# Patient Record
Sex: MALE | Race: OTHER | Employment: OTHER
[De-identification: names, ages, dates, MRNs, and addresses within clinical notes are randomized per-mention and may not be internally consistent; named-entity substitution may affect disease eponyms.]

---

## 2017-01-17 ENCOUNTER — TELEPHONE (OUTPATIENT)
Dept: UROLOGY | Facility: CLINIC | Age: 57
End: 2017-01-17

## 2017-03-29 DIAGNOSIS — R39.13 SPLIT URINARY STREAM: Primary | ICD-10-CM

## 2017-11-10 ENCOUNTER — HOSPITAL ENCOUNTER (OUTPATIENT)
Dept: CT IMAGING | Age: 57
Discharge: HOME | End: 2017-11-10
Payer: MEDICAID

## 2017-11-10 DIAGNOSIS — R51.9 NONINTRACTABLE HEADACHE, UNSPECIFIED CHRONICITY PATTERN, UNSPECIFIED HEADACHE TYPE: ICD-10-CM

## 2017-11-10 PROCEDURE — 70450 CT HEAD/BRAIN W/O DYE: CPT

## 2018-02-16 ENCOUNTER — OFFICE VISIT (OUTPATIENT)
Dept: PODIATRY | Age: 58
End: 2018-02-16
Payer: MEDICAID

## 2018-02-16 VITALS
HEART RATE: 74 BPM | SYSTOLIC BLOOD PRESSURE: 154 MMHG | HEIGHT: 68 IN | BODY MASS INDEX: 34.1 KG/M2 | WEIGHT: 225 LBS | DIASTOLIC BLOOD PRESSURE: 87 MMHG

## 2018-02-16 DIAGNOSIS — M79.672 LEFT FOOT PAIN: ICD-10-CM

## 2018-02-16 DIAGNOSIS — B07.0 PLANTAR WART: Primary | ICD-10-CM

## 2018-02-16 PROCEDURE — 17110 DESTRUCTION B9 LES UP TO 14: CPT | Performed by: PODIATRIST

## 2018-02-16 RX ORDER — LISINOPRIL AND HYDROCHLOROTHIAZIDE 20; 12.5 MG/1; MG/1
TABLET ORAL
Refills: 3 | COMMUNITY
Start: 2018-01-14 | End: 2019-09-18 | Stop reason: ALTCHOICE

## 2018-02-16 RX ORDER — FLUTICASONE PROPIONATE 50 MCG
SPRAY, SUSPENSION (ML) NASAL
COMMUNITY
Start: 2017-09-21 | End: 2019-03-19 | Stop reason: ALTCHOICE

## 2018-02-19 ASSESSMENT — ENCOUNTER SYMPTOMS
BACK PAIN: 0
SHORTNESS OF BREATH: 0
COLOR CHANGE: 0
NAUSEA: 0
DIARRHEA: 0

## 2018-03-02 ENCOUNTER — OFFICE VISIT (OUTPATIENT)
Dept: PODIATRY | Age: 58
End: 2018-03-02
Payer: MEDICAID

## 2018-03-02 VITALS
SYSTOLIC BLOOD PRESSURE: 145 MMHG | HEIGHT: 68 IN | BODY MASS INDEX: 34.1 KG/M2 | HEART RATE: 74 BPM | WEIGHT: 225 LBS | DIASTOLIC BLOOD PRESSURE: 90 MMHG

## 2018-03-02 DIAGNOSIS — B07.0 PLANTAR WART: Primary | ICD-10-CM

## 2018-03-02 DIAGNOSIS — M79.672 LEFT FOOT PAIN: ICD-10-CM

## 2018-03-02 PROCEDURE — 17110 DESTRUCTION B9 LES UP TO 14: CPT | Performed by: PODIATRIST

## 2018-03-05 ASSESSMENT — ENCOUNTER SYMPTOMS
NAUSEA: 0
COLOR CHANGE: 0
DIARRHEA: 0
BACK PAIN: 0
SHORTNESS OF BREATH: 0

## 2018-03-05 NOTE — PROGRESS NOTES
Subjective: Velasquez Gutiérrez 56 y.o. male that presents for follow up evaluation of painful callous to the bottom of the left foot. Chief Complaint   Patient presents with    Other     wart left foot    Patient's treatment thus far has included nightly application of apple cider vinegar and duct tape. Pain is rated 5 out of 10 and is described as intermittent. Patient has been following my prescribed course of therapy as instructed. Review of Systems   Constitutional: Negative for activity change, appetite change, chills, diaphoresis, fatigue and fever. Respiratory: Negative for shortness of breath. Cardiovascular: Negative for leg swelling. Gastrointestinal: Negative for diarrhea and nausea. Endocrine: Negative for cold intolerance, heat intolerance and polyuria. Musculoskeletal: Negative for arthralgias, back pain, gait problem, joint swelling and myalgias. Skin: Negative for color change, pallor, rash and wound. Allergic/Immunologic: Negative for environmental allergies and food allergies. Neurological: Negative for dizziness, weakness, light-headedness and numbness. Hematological: Does not bruise/bleed easily. Psychiatric/Behavioral: Negative for behavioral problems, confusion and self-injury. The patient is not nervous/anxious. Objective: Clinical evaluation of the patient reveals callous formation x 1 to the plantar aspect of the left foot. There is pain with side-to-side compression of this lesion. Debridement of this lesion with a 15 blade reveals pinpoint bleeding. There is no erythema or calor noted to this lesion. Assessment:   1. Plantar wart  AR DESTRUCTION BENIGN LESIONS UP TO 14   2. Left foot pain  AR DESTRUCTION BENIGN LESIONS UP TO 14         Plan: 1. Clinical evaluation of the patient. 2. Following debridement of the wart(s) to the left foot, the area(s) were treated with trichloracetic acid and covered with a Band-Aid.   The patient was instructed to apply Apple cider vinegar and duct tape to the wart(s) nightly. 3. Return in about 2 weeks (around 3/16/2018) for Wart management.    3/2/2018      Mandeep Bui DPM

## 2018-03-16 ENCOUNTER — OFFICE VISIT (OUTPATIENT)
Dept: PODIATRY | Age: 58
End: 2018-03-16
Payer: MEDICAID

## 2018-03-16 VITALS
DIASTOLIC BLOOD PRESSURE: 75 MMHG | BODY MASS INDEX: 33.34 KG/M2 | WEIGHT: 220 LBS | HEART RATE: 86 BPM | SYSTOLIC BLOOD PRESSURE: 132 MMHG | HEIGHT: 68 IN

## 2018-03-16 DIAGNOSIS — B07.0 PLANTAR WART: Primary | ICD-10-CM

## 2018-03-16 DIAGNOSIS — M79.672 LEFT FOOT PAIN: ICD-10-CM

## 2018-03-16 PROCEDURE — 17110 DESTRUCTION B9 LES UP TO 14: CPT | Performed by: PODIATRIST

## 2018-03-20 ASSESSMENT — ENCOUNTER SYMPTOMS
BACK PAIN: 0
DIARRHEA: 0
SHORTNESS OF BREATH: 0
COLOR CHANGE: 0
NAUSEA: 0

## 2018-03-20 NOTE — PROGRESS NOTES
Subjective: Starr Rowland 56 y.o. male that presents for follow up evaluation of wart to the left foot. Chief Complaint   Patient presents with    Follow-up     re-check wart left foot     Patient's treatment thus far has included nightly application of apple cider vinegar and duct tape. Pain is rated 5 out of 10 and is described as intermittent. Patient has been following my prescribed course of therapy as instructed. Patient states that he would like to talk about having the wart cut out. Review of Systems   Constitutional: Negative for activity change, appetite change, chills, diaphoresis, fatigue and fever. Respiratory: Negative for shortness of breath. Cardiovascular: Negative for leg swelling. Gastrointestinal: Negative for diarrhea and nausea. Endocrine: Negative for cold intolerance, heat intolerance and polyuria. Musculoskeletal: Negative for arthralgias, back pain, gait problem, joint swelling and myalgias. Skin: Negative for color change, pallor, rash and wound. Allergic/Immunologic: Negative for environmental allergies and food allergies. Neurological: Negative for dizziness, weakness, light-headedness and numbness. Hematological: Does not bruise/bleed easily. Psychiatric/Behavioral: Negative for behavioral problems, confusion and self-injury. The patient is not nervous/anxious. Objective: Clinical evaluation of the patient reveals callous formation x 1 to the plantar aspect of the left foot.  There is pain with side-to-side compression of this lesion.  Debridement of this lesion with a 15 blade reveals pinpoint bleeding.  There is no erythema or calor noted to this lesion. Assessment:   1. Plantar wart  LA DESTRUCTION BENIGN LESIONS UP TO 14   2. Left foot pain  LA DESTRUCTION BENIGN LESIONS UP TO 14         Plan: 1. Clinical evaluation of the patient.  2. Following debridement of the wart(s) to the left foot, the area(s) were treated with trichloracetic acid and

## 2018-03-22 ENCOUNTER — PROCEDURE VISIT (OUTPATIENT)
Dept: PODIATRY | Age: 58
End: 2018-03-22
Payer: MEDICAID

## 2018-03-22 ENCOUNTER — HOSPITAL ENCOUNTER (OUTPATIENT)
Age: 58
Setting detail: SPECIMEN
Discharge: HOME | End: 2018-03-22
Payer: MEDICAID

## 2018-03-22 VITALS
SYSTOLIC BLOOD PRESSURE: 139 MMHG | WEIGHT: 220 LBS | HEART RATE: 77 BPM | BODY MASS INDEX: 33.34 KG/M2 | DIASTOLIC BLOOD PRESSURE: 61 MMHG | HEIGHT: 68 IN

## 2018-03-22 DIAGNOSIS — B07.0 PLANTAR WART: Primary | ICD-10-CM

## 2018-03-22 DIAGNOSIS — M79.672 LEFT FOOT PAIN: ICD-10-CM

## 2018-03-22 PROCEDURE — 11422 EXC H-F-NK-SP B9+MARG 1.1-2: CPT | Performed by: PODIATRIST

## 2018-03-22 RX ORDER — BUPIVACAINE HYDROCHLORIDE 5 MG/ML
1 INJECTION, SOLUTION PERINEURAL ONCE
Status: COMPLETED | OUTPATIENT
Start: 2018-03-22 | End: 2018-03-22

## 2018-03-22 RX ADMIN — BUPIVACAINE HYDROCHLORIDE 5 MG: 5 INJECTION, SOLUTION PERINEURAL at 10:14

## 2018-03-23 LAB — DERMATOLOGY PATHOLOGY REPORT: NORMAL

## 2018-03-26 ASSESSMENT — ENCOUNTER SYMPTOMS
NAUSEA: 0
SHORTNESS OF BREATH: 0
BACK PAIN: 0
COLOR CHANGE: 0
DIARRHEA: 0

## 2018-04-04 NOTE — PROGRESS NOTES
In Epic
patient. 2. I recommended surgical excision of the wart to the left foot. Patient agreed and a consent was signed by the patient. The area surrounding the wart of the left foot was anesthetized with 3 cc of 0.5% Marcaine plain. The left foot was then prepped and draped in an aseptic manner. Using a fifteen blade the wart was circumscribed 3 mm beyond the periphery of the lesion. A curette was then utilized to excise this lesion down to the depth of the basement membrane. The wound edges were then beveled with a fifteen blade. The wound was treated with TCA and then dressed with antibiotic ointment and a DSD. The patient was given postoperative dressing change instructions. 3. Return in about 2 weeks (around 4/5/2018) for Wart management.    3/22/2018      Alain Ahumada, DPM

## 2018-10-02 ENCOUNTER — HOSPITAL ENCOUNTER (OUTPATIENT)
Dept: CT IMAGING | Age: 58
Discharge: HOME | End: 2018-10-04
Payer: MEDICAID

## 2018-10-02 DIAGNOSIS — R19.06 ABDOMINAL WALL MASS OF EPIGASTRIC REGION: Primary | ICD-10-CM

## 2018-10-02 LAB
GFR NON-AFRICAN AMERICAN: >60 ML/MIN
GFR SERPL CREATININE-BSD FRML MDRD: >60 ML/MIN
GFR SERPL CREATININE-BSD FRML MDRD: NORMAL ML/MIN/{1.73_M2}
POC CREATININE: 1.05 MG/DL (ref 0.51–1.19)

## 2018-10-02 PROCEDURE — 6360000004 HC RX CONTRAST MEDICATION: Performed by: REGISTERED NURSE

## 2018-10-02 PROCEDURE — 82565 ASSAY OF CREATININE: CPT

## 2018-10-02 PROCEDURE — 74160 CT ABDOMEN W/CONTRAST: CPT

## 2018-10-02 RX ADMIN — IOHEXOL 50 ML: 240 INJECTION, SOLUTION INTRATHECAL; INTRAVASCULAR; INTRAVENOUS; ORAL at 09:07

## 2018-10-02 RX ADMIN — IOPAMIDOL 75 ML: 755 INJECTION, SOLUTION INTRAVENOUS at 09:08

## 2018-10-13 ENCOUNTER — APPOINTMENT (OUTPATIENT)
Dept: GENERAL RADIOLOGY | Age: 58
End: 2018-10-13
Payer: MEDICAID

## 2018-10-13 ENCOUNTER — HOSPITAL ENCOUNTER (EMERGENCY)
Age: 58
Discharge: HOME | End: 2018-10-13
Attending: EMERGENCY MEDICINE
Payer: MEDICAID

## 2018-10-13 VITALS
RESPIRATION RATE: 16 BRPM | OXYGEN SATURATION: 97 % | BODY MASS INDEX: 31.18 KG/M2 | SYSTOLIC BLOOD PRESSURE: 138 MMHG | TEMPERATURE: 98.1 F | HEIGHT: 66 IN | DIASTOLIC BLOOD PRESSURE: 68 MMHG | WEIGHT: 194 LBS | HEART RATE: 76 BPM

## 2018-10-13 DIAGNOSIS — R07.9 CHEST PAIN, UNSPECIFIED TYPE: ICD-10-CM

## 2018-10-13 DIAGNOSIS — H81.10 BENIGN PAROXYSMAL POSITIONAL VERTIGO, UNSPECIFIED LATERALITY: Primary | ICD-10-CM

## 2018-10-13 LAB
ABSOLUTE EOS #: 0.2 K/UL (ref 0–0.44)
ABSOLUTE IMMATURE GRANULOCYTE: 0.05 K/UL (ref 0–0.3)
ABSOLUTE LYMPH #: 2.28 K/UL (ref 1.1–3.7)
ABSOLUTE MONO #: 0.54 K/UL (ref 0.1–1.2)
ANION GAP SERPL CALCULATED.3IONS-SCNC: 13 MMOL/L (ref 9–17)
BASOPHILS # BLD: 0 % (ref 0–2)
BASOPHILS ABSOLUTE: <0.03 K/UL (ref 0–0.2)
BUN BLDV-MCNC: 20 MG/DL (ref 6–20)
BUN/CREAT BLD: ABNORMAL (ref 9–20)
CALCIUM SERPL-MCNC: 9 MG/DL (ref 8.6–10.4)
CHLORIDE BLD-SCNC: 99 MMOL/L (ref 98–107)
CO2: 25 MMOL/L (ref 20–31)
CREAT SERPL-MCNC: 1 MG/DL (ref 0.7–1.2)
DIFFERENTIAL TYPE: ABNORMAL
EKG ATRIAL RATE: 66 BPM
EKG P AXIS: 9 DEGREES
EKG P-R INTERVAL: 148 MS
EKG Q-T INTERVAL: 436 MS
EKG QRS DURATION: 84 MS
EKG QTC CALCULATION (BAZETT): 457 MS
EKG R AXIS: -7 DEGREES
EKG T AXIS: 18 DEGREES
EKG VENTRICULAR RATE: 66 BPM
EOSINOPHILS RELATIVE PERCENT: 3 % (ref 1–4)
GFR AFRICAN AMERICAN: >60 ML/MIN
GFR NON-AFRICAN AMERICAN: >60 ML/MIN
GFR SERPL CREATININE-BSD FRML MDRD: ABNORMAL ML/MIN/{1.73_M2}
GFR SERPL CREATININE-BSD FRML MDRD: ABNORMAL ML/MIN/{1.73_M2}
GLUCOSE BLD-MCNC: 164 MG/DL (ref 70–99)
HCT VFR BLD CALC: 46.2 % (ref 40.7–50.3)
HEMOGLOBIN: 15.2 G/DL (ref 13–17)
IMMATURE GRANULOCYTES: 1 %
LYMPHOCYTES # BLD: 30 % (ref 24–43)
MAGNESIUM: 2 MG/DL (ref 1.6–2.6)
MCH RBC QN AUTO: 28.4 PG (ref 25.2–33.5)
MCHC RBC AUTO-ENTMCNC: 32.9 G/DL (ref 28.4–34.8)
MCV RBC AUTO: 86.4 FL (ref 82.6–102.9)
MONOCYTES # BLD: 7 % (ref 3–12)
NRBC AUTOMATED: 0 PER 100 WBC
PDW BLD-RTO: 12.8 % (ref 11.8–14.4)
PLATELET # BLD: 230 K/UL (ref 138–453)
PLATELET ESTIMATE: ABNORMAL
PMV BLD AUTO: 11.5 FL (ref 8.1–13.5)
POC TROPONIN I: 0 NG/ML (ref 0–0.1)
POC TROPONIN I: 0.01 NG/ML (ref 0–0.1)
POC TROPONIN INTERP: NORMAL
POC TROPONIN INTERP: NORMAL
POTASSIUM SERPL-SCNC: 3.9 MMOL/L (ref 3.7–5.3)
RBC # BLD: 5.35 M/UL (ref 4.21–5.77)
RBC # BLD: ABNORMAL 10*6/UL
SEG NEUTROPHILS: 59 % (ref 36–65)
SEGMENTED NEUTROPHILS ABSOLUTE COUNT: 4.56 K/UL (ref 1.5–8.1)
SODIUM BLD-SCNC: 137 MMOL/L (ref 135–144)
WBC # BLD: 7.7 K/UL (ref 3.5–11.3)
WBC # BLD: ABNORMAL 10*3/UL

## 2018-10-13 PROCEDURE — 99285 EMERGENCY DEPT VISIT HI MDM: CPT

## 2018-10-13 PROCEDURE — 96374 THER/PROPH/DIAG INJ IV PUSH: CPT

## 2018-10-13 PROCEDURE — 93005 ELECTROCARDIOGRAM TRACING: CPT

## 2018-10-13 PROCEDURE — 71045 X-RAY EXAM CHEST 1 VIEW: CPT

## 2018-10-13 PROCEDURE — 6370000000 HC RX 637 (ALT 250 FOR IP): Performed by: EMERGENCY MEDICINE

## 2018-10-13 PROCEDURE — 2580000003 HC RX 258: Performed by: EMERGENCY MEDICINE

## 2018-10-13 PROCEDURE — 80048 BASIC METABOLIC PNL TOTAL CA: CPT

## 2018-10-13 PROCEDURE — 85025 COMPLETE CBC W/AUTO DIFF WBC: CPT

## 2018-10-13 PROCEDURE — 83735 ASSAY OF MAGNESIUM: CPT

## 2018-10-13 PROCEDURE — 84484 ASSAY OF TROPONIN QUANT: CPT

## 2018-10-13 PROCEDURE — 6360000002 HC RX W HCPCS

## 2018-10-13 RX ORDER — ASPIRIN 81 MG/1
324 TABLET, CHEWABLE ORAL ONCE
Status: COMPLETED | OUTPATIENT
Start: 2018-10-13 | End: 2018-10-13

## 2018-10-13 RX ORDER — MECLIZINE HCL 12.5 MG/1
25 TABLET ORAL ONCE
Status: COMPLETED | OUTPATIENT
Start: 2018-10-13 | End: 2018-10-13

## 2018-10-13 RX ORDER — 0.9 % SODIUM CHLORIDE 0.9 %
1000 INTRAVENOUS SOLUTION INTRAVENOUS ONCE
Status: COMPLETED | OUTPATIENT
Start: 2018-10-13 | End: 2018-10-13

## 2018-10-13 RX ORDER — ONDANSETRON 2 MG/ML
INJECTION INTRAMUSCULAR; INTRAVENOUS
Status: COMPLETED
Start: 2018-10-13 | End: 2018-10-13

## 2018-10-13 RX ORDER — ONDANSETRON 2 MG/ML
4 INJECTION INTRAMUSCULAR; INTRAVENOUS ONCE
Status: COMPLETED | OUTPATIENT
Start: 2018-10-13 | End: 2018-10-13

## 2018-10-13 RX ORDER — MECLIZINE HYDROCHLORIDE 25 MG/1
25 TABLET ORAL 3 TIMES DAILY PRN
Qty: 30 TABLET | Refills: 0 | Status: ON HOLD | OUTPATIENT
Start: 2018-10-13 | End: 2019-02-07

## 2018-10-13 RX ADMIN — ONDANSETRON 4 MG: 2 INJECTION INTRAMUSCULAR; INTRAVENOUS at 11:00

## 2018-10-13 RX ADMIN — ONDANSETRON HYDROCHLORIDE 4 MG: 2 INJECTION, SOLUTION INTRAMUSCULAR; INTRAVENOUS at 11:00

## 2018-10-13 RX ADMIN — ASPIRIN 81 MG 324 MG: 81 TABLET ORAL at 10:59

## 2018-10-13 RX ADMIN — MECLIZINE HYDROCHLORIDE 25 MG: 12.5 TABLET, FILM COATED ORAL at 10:59

## 2018-10-13 RX ADMIN — SODIUM CHLORIDE 1000 ML: 9 INJECTION, SOLUTION INTRAVENOUS at 10:59

## 2018-10-14 ASSESSMENT — ENCOUNTER SYMPTOMS
CONSTIPATION: 0
PHOTOPHOBIA: 0
VOMITING: 0
NAUSEA: 0
SORE THROAT: 0
SHORTNESS OF BREATH: 1
COUGH: 0
DIARRHEA: 0
ABDOMINAL PAIN: 0
WHEEZING: 0
RHINORRHEA: 0

## 2018-10-14 NOTE — ED PROVIDER NOTES
Q-T Interval 436 ms    QTc Calculation (Bazett) 457 ms    P Axis 9 degrees    R Axis -7 degrees    T Axis 18 degrees       IMPRESSION: 16-year-old male presented with complaints of vertigo with sudden onset this morning without associated neuro symptoms and with associated chest pain. Stable vitals here patient does appear to be in mild acute distress similar to the vertigo. [place]-Hallpike maneuver performed by attending physician with positive symptoms when patient's looks left. Rest of the neurological exam benign. We'll give patient Zofran, aspirin, saline, and Antivert. We'll obtain chest x-ray, troponin, EKG, magnesium, BMP, and CBC. Results came back unremarkable for any acute findings. Patient's symptoms have significantly improved with medication. Patient sent home with prescription for Antivert. Patient advised to follow up with PCP and to return to the ED for any worsening symptoms. Patient voiced understanding and agreed with plan. RADIOLOGY:  Ct Abdomen W Contrast    Result Date: 10/2/2018  EXAMINATION: CT ABDOMEN WITH CONTRAST 10/2/2018 9:09 am TECHNIQUE: CT of the abdomen was performed with the administration of intravenous contrast. Multiplanar reformatted images are provided for review. Dose modulation, iterative reconstruction, and/or weight based adjustment of the mA/kV was utilized to reduce the radiation dose to as low as reasonably achievable. COMPARISON: None. HISTORY: ORDERING SYSTEM PROVIDED HISTORY: Abdominal wall mass of epigastric region TECHNOLOGIST PROVIDED HISTORY: Epigastric pain Additional Contrast?->Radiologist Recommendation FINDINGS: Lower Chest: Normal aeration of the lung bases. Heart is normal in size. No pericardial effusion. Organs: Mild diffuse fatty infiltration of the liver. Gallbladder, pancreas, spleen, bilateral adrenal glands are unremarkable. Bilateral kidneys and ureters are unremarkable.   Punctate nonobstructive stone at the lower pole of the left kidney. GI/Bowel: Stomach, small and large bowel loops are grossly unremarkable. Peritoneum/Retroperitoneum: No free intraperitoneal fluid or air. No abdominal aortic aneurysm. No retroperitoneal lymphadenopathy. Bones/Soft Tissues: No lytic or blastic lesions in all visualized osseous structures. No soft tissue mass in the anterior abdominal wall in the epigastric region. No anterior abdominal wall hernia. No soft tissue mass in the upper anterior abdominal wall. No abdominal wall hernia. Xr Chest Portable    Result Date: 10/13/2018  EXAMINATION: SINGLE XRAY VIEW OF THE CHEST 10/13/2018 11:05 am COMPARISON: 08/12/2006 HISTORY: ORDERING SYSTEM PROVIDED HISTORY: tano scott TECHNOLOGIST PROVIDED HISTORY: tano scott FINDINGS: Lungs: Clear Mediastinum: Unremarkable Pleura: No pleural effusion or pneumothorax Other: Unremarkable. No acute pulmonary process. EKG  EKG Interpretation    Interpreted by me    Rhythm: normal sinus   Rate: normal  Axis: normal  Ectopy: none  Conduction: normal  ST Segments: no acute change  T Waves: no acute change  Q Waves: none    Clinical Impression: no acute changes and normal EKG, evidence of borderline criteria for inferior infarct no longer present when compared to EKG of 10/30/2006    All EKG's are interpreted by the Emergency Department Physician who either signs or Co-signsthis chart in the absence of a cardiologist.    EMERGENCY DEPARTMENT COURSE:  Refer to [facility]    PROCEDURES:  None    CONSULTS:  None    CRITICAL CARE:  None    FINAL IMPRESSION      1. Benign paroxysmal positional vertigo, unspecified laterality    2.  Chest pain, unspecified type          DISPOSITION / PLAN     DISPOSITION Decision To Discharge 10/13/2018 01:53:33 PM      PATIENT REFERRED TO:  Paula Bunch, APRN - CNP  [address] [facility] [address]  [address]  [facility] [phone]    Call in 1 day  As needed, If symptoms worsen      DISCHARGE MEDICATIONS:  Discharge Medication List as of 10/13/2018 1:56 PM          Cedric Mitchell MD  Emergency Medicine Resident    (Please note that portions of this note were completed with a voice recognition program.  Rahul Culver made to edit the dictations but occasionally words are mis-transcribed.)        Cedric Mitchell MD  Resident  10/14/18 [id number]

## 2018-11-19 ENCOUNTER — HOSPITAL ENCOUNTER (EMERGENCY)
Age: 58
Discharge: HOME | End: 2018-11-19
Attending: EMERGENCY MEDICINE
Payer: MEDICAID

## 2018-11-19 VITALS
SYSTOLIC BLOOD PRESSURE: 162 MMHG | TEMPERATURE: 98.2 F | RESPIRATION RATE: 14 BRPM | OXYGEN SATURATION: 100 % | WEIGHT: 240 LBS | DIASTOLIC BLOOD PRESSURE: 97 MMHG | HEART RATE: 83 BPM | BODY MASS INDEX: 38.74 KG/M2

## 2018-11-19 DIAGNOSIS — R21 RASH AND OTHER NONSPECIFIC SKIN ERUPTION: Primary | ICD-10-CM

## 2018-11-19 PROCEDURE — 6370000000 HC RX 637 (ALT 250 FOR IP): Performed by: EMERGENCY MEDICINE

## 2018-11-19 PROCEDURE — G0381 LEV 2 HOSP TYPE B ED VISIT: HCPCS

## 2018-11-19 RX ORDER — DIPHENHYDRAMINE HCL 25 MG
25 CAPSULE ORAL EVERY 4 HOURS PRN
Qty: 10 CAPSULE | Refills: 0 | Status: SHIPPED | OUTPATIENT
Start: 2018-11-19 | End: 2018-11-29

## 2018-11-19 RX ORDER — PREDNISONE 20 MG/1
60 TABLET ORAL ONCE
Status: COMPLETED | OUTPATIENT
Start: 2018-11-19 | End: 2018-11-19

## 2018-11-19 RX ORDER — PREDNISONE 50 MG/1
50 TABLET ORAL DAILY
Qty: 4 TABLET | Refills: 0 | Status: SHIPPED | OUTPATIENT
Start: 2018-11-19 | End: 2019-01-17 | Stop reason: ALTCHOICE

## 2018-11-19 RX ORDER — FAMOTIDINE 20 MG/1
20 TABLET, FILM COATED ORAL 2 TIMES DAILY
Qty: 20 TABLET | Refills: 0 | Status: ON HOLD | OUTPATIENT
Start: 2018-11-19 | End: 2019-02-07

## 2018-11-19 RX ORDER — DIPHENHYDRAMINE HCL 25 MG
25 TABLET ORAL ONCE
Status: COMPLETED | OUTPATIENT
Start: 2018-11-19 | End: 2018-11-19

## 2018-11-19 RX ORDER — FAMOTIDINE 20 MG/1
20 TABLET, FILM COATED ORAL ONCE
Status: COMPLETED | OUTPATIENT
Start: 2018-11-19 | End: 2018-11-19

## 2018-11-19 RX ORDER — IBUPROFEN 600 MG/1
600 TABLET ORAL EVERY 6 HOURS PRN
Qty: 30 TABLET | Refills: 0 | Status: SHIPPED | OUTPATIENT
Start: 2018-11-19 | End: 2019-03-19 | Stop reason: ALTCHOICE

## 2018-11-19 RX ADMIN — PREDNISONE 60 MG: 20 TABLET ORAL at 17:54

## 2018-11-19 RX ADMIN — FAMOTIDINE 20 MG: 20 TABLET, FILM COATED ORAL at 17:54

## 2018-11-19 RX ADMIN — DIPHENHYDRAMINE HCL 25 MG: 25 TABLET ORAL at 17:54

## 2018-11-19 ASSESSMENT — PAIN SCALES - GENERAL: PAINLEVEL_OUTOF10: 6

## 2018-11-19 ASSESSMENT — PAIN DESCRIPTION - FREQUENCY: FREQUENCY: CONTINUOUS

## 2018-11-19 ASSESSMENT — ENCOUNTER SYMPTOMS
ABDOMINAL PAIN: 0
BACK PAIN: 0
NAUSEA: 1
SHORTNESS OF BREATH: 0
SORE THROAT: 0
VOMITING: 0

## 2018-11-19 ASSESSMENT — PAIN DESCRIPTION - LOCATION: LOCATION: NECK

## 2018-11-19 NOTE — ED PROVIDER NOTES
Taking? Authorizing Provider   predniSONE (DELTASONE) 50 MG tablet Take 1 tablet by mouth daily 11/19/18  Yes Olman Mckay MD   diphenhydrAMINE (BENADRYL) 25 MG capsule Take 1 capsule by mouth every 4 hours as needed for Itching 11/19/18 11/29/18 Yes Olman Mckay MD   famotidine (PEPCID) 20 MG tablet Take 1 tablet by mouth 2 times daily 11/19/18  Yes Olman Mckay MD   ibuprofen (ADVIL;MOTRIN) 600 MG tablet Take 1 tablet by mouth every 6 hours as needed for Pain 11/19/18  Yes Olman Mckay MD   lisinopril-hydrochlorothiazide (PRINZIDE;ZESTORETIC) 20-12.5 MG per tablet TK 1 T PO DAILY 1/14/18  Yes Historical Provider, MD   meclizine (ANTIVERT) 25 MG tablet Take 1 tablet by mouth 3 times daily as needed for Dizziness 10/13/18   Janet Gilman MD   fluticasone (FLONASE) 50 MCG/ACT nasal spray USE 1 SPRAY INTO EACH NOSTRIL BID PRN 9/21/17   Historical Provider, MD       REVIEW OF SYSTEMS    (2-9 systems for level 4, 10 or more for level 5)      Review of Systems   Constitutional: Negative for chills and fever. HENT: Negative for sore throat. Respiratory: Negative for shortness of breath. Cardiovascular: Negative for chest pain. Gastrointestinal: Positive for nausea. Negative for abdominal pain and vomiting. Genitourinary: Negative for dysuria. Musculoskeletal: Negative for back pain. Skin: Positive for rash. Neurological: Negative for headaches. Psychiatric/Behavioral: Negative for confusion. PHYSICAL EXAM   (up to 7 for level 4, 8 or more for level 5)      INITIAL VITALS:   BP (!) 162/97   Pulse 83   Temp 98.2 °F (36.8 °C) (Oral)   Resp 14   Wt 240 lb (108.9 kg)   SpO2 100%   BMI 38.74 kg/m²     Physical Exam   Constitutional: He is oriented to person, place, and time. He appears well-developed and well-nourished. No distress. HENT:   Head: Normocephalic and atraumatic. Mouth/Throat: Oropharynx is clear and moist. No oropharyngeal exudate.    Eyes: Pupils are equal, round, and reactive to light. EOM are normal.   Neck: Normal range of motion. Neck supple. No JVD present. Cardiovascular: Normal rate and regular rhythm. Exam reveals no gallop and no friction rub. No murmur heard. Pulmonary/Chest: Effort normal and breath sounds normal. No respiratory distress. He has no wheezes. Abdominal: Soft. He exhibits no distension. There is no tenderness. Musculoskeletal: Normal range of motion. Lymphadenopathy:     He has no cervical adenopathy. Neurological: He is alert and oriented to person, place, and time. Skin: Skin is warm and dry. Capillary refill takes less than 2 seconds. Rash (3 x 3 cm erythematous tender raised wheal, no fluctuance or induration; 2 other smaller lesions to left posterior scapula ) noted. He is not diaphoretic. Nursing note and vitals reviewed. DIFFERENTIAL  DIAGNOSIS     PLAN (LABS / IMAGING / EKG):  No orders of the defined types were placed in this encounter. MEDICATIONS ORDERED:  Orders Placed This Encounter   Medications    famotidine (PEPCID) tablet 20 mg    diphenhydrAMINE (BENADRYL) tablet 25 mg    predniSONE (DELTASONE) tablet 60 mg    predniSONE (DELTASONE) 50 MG tablet     Sig: Take 1 tablet by mouth daily     Dispense:  4 tablet     Refill:  0    diphenhydrAMINE (BENADRYL) 25 MG capsule     Sig: Take 1 capsule by mouth every 4 hours as needed for Itching     Dispense:  10 capsule     Refill:  0    famotidine (PEPCID) 20 MG tablet     Sig: Take 1 tablet by mouth 2 times daily     Dispense:  20 tablet     Refill:  0    ibuprofen (ADVIL;MOTRIN) 600 MG tablet     Sig: Take 1 tablet by mouth every 6 hours as needed for Pain     Dispense:  30 tablet     Refill:  0       DDX: Insect bite, allergic reaction, rash, abscess    DIAGNOSTIC RESULTS / EMERGENCY DEPARTMENT COURSE / MDM     LABS:  No results found for this visit on 11/19/18.       RADIOLOGY:  None    EKG  None    All EKG's are interpreted by the Emergency Department Physician who either signs or Co-signs this chart in the absence of a cardiologist.    MDM/EMERGENCY DEPARTMENT COURSE:  Patient is a 53-year-old male presents with possible insect bites and rash to posterior neck, left scapular region. States its itchy and tender. On exam he has erythematous tender raised wheals to the back of the neck and left scapula, see imaging above. No fluctuance, no abscess, I did outline the lesions with a marker. Given the significant swelling, we'll give prednisone, Pepcid, Benadryl and discharged with these medications and instructed the patient that if it does not improve in the next 24 hours or worsens to return to the emergency department for further evaluation. Also give prescription for Motrin. PROCEDURES:  None    CONSULTS:  None    CRITICAL CARE:  None    FINAL IMPRESSION      1.  Rash and other nonspecific skin eruption          DISPOSITION / PLAN     DISPOSITION Decision To Discharge 11/19/2018 06:01:27 PM      PATIENT REFERRED TO:  Mao Spears, APRN - CNP  [address] [facility]  [address]  [place] [address]  [phone]    Schedule an appointment as soon as possible for a visit       [facility] ED  [address] [place] [address]  [phone]  In 1 day  Return in 24 hours if symptoms/swelling does not improve      DISCHARGE MEDICATIONS:  Discharge Medication List as of 11/19/2018  6:05 PM      START taking these medications    Details   predniSONE (DELTASONE) 50 MG tablet Take 1 tablet by mouth daily, Disp-4 tablet, R-0Print      diphenhydrAMINE (BENADRYL) 25 MG capsule Take 1 capsule by mouth every 4 hours as needed for Itching, Disp-10 capsule, R-0Print      famotidine (PEPCID) 20 MG tablet Take 1 tablet by mouth 2 times daily, Disp-20 tablet, R-0Print             Christy Torres MD  Emergency Medicine Resident    (Please note that portions of thisnote were completed with a voice recognition program.  Efforts were made to edit the dictations but occasionally words are mis-transcribed. )        Tay Wells MD  Resident  11/19/18 [id number]

## 2018-11-27 ENCOUNTER — HOSPITAL ENCOUNTER (EMERGENCY)
Age: 58
Discharge: LEFT BEFORE DISCHARGE | End: 2018-11-27
Attending: EMERGENCY MEDICINE
Payer: MEDICAID

## 2018-11-27 ENCOUNTER — APPOINTMENT (OUTPATIENT)
Dept: GENERAL RADIOLOGY | Age: 58
End: 2018-11-27
Payer: MEDICAID

## 2018-11-27 VITALS
RESPIRATION RATE: 21 BRPM | OXYGEN SATURATION: 96 % | SYSTOLIC BLOOD PRESSURE: 121 MMHG | DIASTOLIC BLOOD PRESSURE: 68 MMHG | BODY MASS INDEX: 36.37 KG/M2 | TEMPERATURE: 98.1 F | HEART RATE: 69 BPM | HEIGHT: 68 IN | WEIGHT: 240 LBS

## 2018-11-27 DIAGNOSIS — R07.9 CHEST PAIN, UNSPECIFIED TYPE: Primary | ICD-10-CM

## 2018-11-27 LAB
ABSOLUTE EOS #: 0.33 K/UL (ref 0–0.44)
ABSOLUTE IMMATURE GRANULOCYTE: 0.04 K/UL (ref 0–0.3)
ABSOLUTE LYMPH #: 3.37 K/UL (ref 1.1–3.7)
ABSOLUTE MONO #: 0.75 K/UL (ref 0.1–1.2)
ANION GAP SERPL CALCULATED.3IONS-SCNC: 12 MMOL/L (ref 9–17)
BASOPHILS # BLD: 1 % (ref 0–2)
BASOPHILS ABSOLUTE: 0.05 K/UL (ref 0–0.2)
BUN BLDV-MCNC: 14 MG/DL (ref 6–20)
BUN/CREAT BLD: ABNORMAL (ref 9–20)
CALCIUM SERPL-MCNC: 9.3 MG/DL (ref 8.6–10.4)
CHLORIDE BLD-SCNC: 99 MMOL/L (ref 98–107)
CO2: 24 MMOL/L (ref 20–31)
CREAT SERPL-MCNC: 0.99 MG/DL (ref 0.7–1.2)
D-DIMER QUANTITATIVE: 0.42 MG/L FEU
DIFFERENTIAL TYPE: NORMAL
EKG ATRIAL RATE: 78 BPM
EKG P AXIS: 61 DEGREES
EKG P-R INTERVAL: 176 MS
EKG Q-T INTERVAL: 388 MS
EKG QRS DURATION: 80 MS
EKG QTC CALCULATION (BAZETT): 442 MS
EKG R AXIS: -16 DEGREES
EKG T AXIS: 41 DEGREES
EKG VENTRICULAR RATE: 78 BPM
EOSINOPHILS RELATIVE PERCENT: 4 % (ref 1–4)
GFR AFRICAN AMERICAN: >60 ML/MIN
GFR NON-AFRICAN AMERICAN: >60 ML/MIN
GFR SERPL CREATININE-BSD FRML MDRD: ABNORMAL ML/MIN/{1.73_M2}
GFR SERPL CREATININE-BSD FRML MDRD: ABNORMAL ML/MIN/{1.73_M2}
GLUCOSE BLD-MCNC: 131 MG/DL (ref 70–99)
HCT VFR BLD CALC: 46.6 % (ref 40.7–50.3)
HEMOGLOBIN: 15.5 G/DL (ref 13–17)
IMMATURE GRANULOCYTES: 0 %
LIPASE: 56 U/L (ref 13–60)
LYMPHOCYTES # BLD: 38 % (ref 24–43)
MCH RBC QN AUTO: 28.8 PG (ref 25.2–33.5)
MCHC RBC AUTO-ENTMCNC: 33.3 G/DL (ref 28.4–34.8)
MCV RBC AUTO: 86.6 FL (ref 82.6–102.9)
MONOCYTES # BLD: 8 % (ref 3–12)
NRBC AUTOMATED: 0 PER 100 WBC
PDW BLD-RTO: 12.8 % (ref 11.8–14.4)
PLATELET # BLD: 218 K/UL (ref 138–453)
PLATELET ESTIMATE: NORMAL
PMV BLD AUTO: 11.6 FL (ref 8.1–13.5)
POC TROPONIN I: 0 NG/ML (ref 0–0.1)
POC TROPONIN I: 0 NG/ML (ref 0–0.1)
POC TROPONIN INTERP: NORMAL
POC TROPONIN INTERP: NORMAL
POTASSIUM SERPL-SCNC: 3.6 MMOL/L (ref 3.7–5.3)
RBC # BLD: 5.38 M/UL (ref 4.21–5.77)
RBC # BLD: NORMAL 10*6/UL
SEG NEUTROPHILS: 49 % (ref 36–65)
SEGMENTED NEUTROPHILS ABSOLUTE COUNT: 4.35 K/UL (ref 1.5–8.1)
SODIUM BLD-SCNC: 135 MMOL/L (ref 135–144)
WBC # BLD: 8.9 K/UL (ref 3.5–11.3)
WBC # BLD: NORMAL 10*3/UL

## 2018-11-27 PROCEDURE — 71046 X-RAY EXAM CHEST 2 VIEWS: CPT

## 2018-11-27 PROCEDURE — 80048 BASIC METABOLIC PNL TOTAL CA: CPT

## 2018-11-27 PROCEDURE — 6370000000 HC RX 637 (ALT 250 FOR IP): Performed by: STUDENT IN AN ORGANIZED HEALTH CARE EDUCATION/TRAINING PROGRAM

## 2018-11-27 PROCEDURE — 85379 FIBRIN DEGRADATION QUANT: CPT

## 2018-11-27 PROCEDURE — 6360000002 HC RX W HCPCS: Performed by: STUDENT IN AN ORGANIZED HEALTH CARE EDUCATION/TRAINING PROGRAM

## 2018-11-27 PROCEDURE — 93005 ELECTROCARDIOGRAM TRACING: CPT

## 2018-11-27 PROCEDURE — 84484 ASSAY OF TROPONIN QUANT: CPT

## 2018-11-27 PROCEDURE — 83690 ASSAY OF LIPASE: CPT

## 2018-11-27 PROCEDURE — 85025 COMPLETE CBC W/AUTO DIFF WBC: CPT

## 2018-11-27 PROCEDURE — 99285 EMERGENCY DEPT VISIT HI MDM: CPT

## 2018-11-27 RX ORDER — ASPIRIN 81 MG/1
324 TABLET, CHEWABLE ORAL ONCE
Status: COMPLETED | OUTPATIENT
Start: 2018-11-27 | End: 2018-11-27

## 2018-11-27 RX ORDER — ONDANSETRON 4 MG/1
4 TABLET, ORALLY DISINTEGRATING ORAL ONCE
Status: COMPLETED | OUTPATIENT
Start: 2018-11-27 | End: 2018-11-27

## 2018-11-27 RX ORDER — MORPHINE SULFATE 4 MG/ML
2 INJECTION, SOLUTION INTRAMUSCULAR; INTRAVENOUS ONCE
Status: DISCONTINUED | OUTPATIENT
Start: 2018-11-27 | End: 2018-11-27 | Stop reason: HOSPADM

## 2018-11-27 RX ADMIN — ASPIRIN 81 MG 324 MG: 81 TABLET ORAL at 02:53

## 2018-11-27 RX ADMIN — ONDANSETRON 4 MG: 4 TABLET, ORALLY DISINTEGRATING ORAL at 02:53

## 2018-11-27 ASSESSMENT — ENCOUNTER SYMPTOMS
CONSTIPATION: 0
EYE REDNESS: 0
DIARRHEA: 0
BACK PAIN: 1
PHOTOPHOBIA: 0
ABDOMINAL PAIN: 0
VOMITING: 0
COUGH: 0
COLOR CHANGE: 0
WHEEZING: 0
STRIDOR: 0
SHORTNESS OF BREATH: 0
NAUSEA: 1
SORE THROAT: 0
TROUBLE SWALLOWING: 0
BLOOD IN STOOL: 0

## 2018-11-27 ASSESSMENT — PAIN DESCRIPTION - LOCATION: LOCATION: LEG;CHEST

## 2018-11-27 ASSESSMENT — PAIN DESCRIPTION - DESCRIPTORS: DESCRIPTORS: PRESSURE

## 2018-11-27 ASSESSMENT — PAIN DESCRIPTION - PAIN TYPE: TYPE: ACUTE PAIN

## 2018-11-27 ASSESSMENT — HEART SCORE: ECG: 0

## 2018-11-27 ASSESSMENT — PAIN DESCRIPTION - ORIENTATION: ORIENTATION: LEFT

## 2018-11-27 NOTE — ED PROVIDER NOTES
[facility] ED  Emergency Department Encounter  Emergency Medicine Resident     Pt Name: Pina Hatfield  MRN: [id number]  [place] 1960  Date of evaluation: 11/27/18  PCP:  CARMELA Ty CNP    CHIEF COMPLAINT       Chief Complaint   Patient presents with    Chest Pain    Leg Pain       HISTORY OFPRESENT ILLNESS  (Location/Symptom, Timing/Onset, Context/Setting, Quality, Duration, Modifying Factors,Severity.)      Pina Hatfield is a 64yo male who presents with Complaints of chest pain and left leg pain. She has a history of diabetes, hypertension. Patient states that the last 3 days she has had intermittent episodes of left-sided chest pain which radiates to his back, patient has had associated nausea, shortness of breath and epigastric abdominal pain but denies any vomiting, diaphoresis. Patient did not try anything at home for the pain. Patient also complaining of some left calf pain. He denies any trauma, history of DVT/PE, recent surgery, immobilization, hemoptysis, fever, chills, numbness, weakness, tingling, decreased range of motion, loss of bowel or bladder function, vision changes, headache. PAST MEDICAL / SURGICAL / SOCIAL / FAMILY HISTORY      has a past medical history of Diabetes mellitus ([address] Hematuria; and Hypertension. has a past surgical history that includes Appendectomy; Neck surgery; and Cystocopy. Social History     Social History    Marital status: Single     Spouse name: N/A    Number of children: N/A    Years of education: N/A     Occupational History    Not on file.      Social History Main Topics    Smoking status: Never Smoker    Smokeless tobacco: Never Used    Alcohol use No    Drug use: No    Sexual activity: Not on file     Other Topics Concern    Not on file     Social History Narrative    No narrative on file       Family History   Problem Relation Age of Onset    High Blood Pressure Mother         Allergies: Psychiatric: He has a normal mood and affect. His behavior is normal. Thought content normal.   Nursing note and vitals reviewed. DIFFERENTIAL  DIAGNOSIS     PLAN (LABS / IMAGING / EKG):  Orders Placed This Encounter   Procedures    XR CHEST STANDARD (2 VW)    Basic Metabolic Panel    CBC Auto Differential    LIPASE    D-Dimer, Quantitative    BP check on specific side/area    POCT troponin    POCT troponin    POCT troponin    EKG 12 Lead       MEDICATIONS ORDERED:  Orders Placed This Encounter   Medications    aspirin chewable tablet 324 mg    ondansetron (ZOFRAN-ODT) disintegrating tablet 4 mg    morphine (PF) injection 2 mg       DDX: Emergent: ACS/NSTEMI/STEMI/angina, arrhythmia, trauma, aortic dissection,  PE, PNA, pneumothroax, esophageal rupture, tamponade, Cocaine use  Nonemergent: pneumonia, pericarditis, GERD, MSK, Endocarditis, anxiety       Initial MDM/Plan: 66 y.o. male who presents with Plans of chest pain, nausea, left calf pain. Patient is nontoxic and well-appearing on exam, vital signs are stable except for mildly hypertensive. On exam patient is obese, no pulsatile mass, radial pulses equal bilateral, blood pressures same bilaterally upper extremities, chest pain is not reproducible. No calf pain on exam, no erythema, swelling, palpable cords. Will obtain cardiac workup including chest x-ray, EKG, troponin ×2, CBC, BMP, lipase. Patient complaining of chest pain that radiates to his back, we'll obtain d-dimer for concerns of dissection. Pt given 324 mg aspirin, morphine for pain. Will do bedside ultrasound of aorta to check for AAA. Bedside ultrasound limited due to obesity and bowel gas, unable to visualize aorta. Labs unremarkable. Given not elevated d-dimer, chest x-ray without any mediastinal widening, blood pressures equal in bilateral upper extremities concern for dissection is low.   Patient has a heart score of 4 and would like to admit the patient to the

## 2019-01-04 ENCOUNTER — HOSPITAL ENCOUNTER (OUTPATIENT)
Dept: GENERAL RADIOLOGY | Age: 59
Discharge: HOME | End: 2019-01-06
Payer: MEDICAID

## 2019-01-04 ENCOUNTER — HOSPITAL ENCOUNTER (OUTPATIENT)
Age: 59
Discharge: HOME | End: 2019-01-04
Payer: MEDICAID

## 2019-01-04 ENCOUNTER — HOSPITAL ENCOUNTER (OUTPATIENT)
Age: 59
Discharge: HOME | End: 2019-01-06
Payer: MEDICAID

## 2019-01-04 DIAGNOSIS — M79.672 LEFT FOOT PAIN: ICD-10-CM

## 2019-01-04 LAB
ESTIMATED AVERAGE GLUCOSE: 146 MG/DL
HBA1C MFR BLD: 6.7 % (ref 4–6)
URIC ACID: 7.2 MG/DL (ref 3.4–7)

## 2019-01-04 PROCEDURE — 83036 HEMOGLOBIN GLYCOSYLATED A1C: CPT

## 2019-01-04 PROCEDURE — 84550 ASSAY OF BLOOD/URIC ACID: CPT

## 2019-01-04 PROCEDURE — 73630 X-RAY EXAM OF FOOT: CPT

## 2019-01-04 PROCEDURE — 36415 COLL VENOUS BLD VENIPUNCTURE: CPT

## 2019-01-15 ENCOUNTER — HOSPITAL ENCOUNTER (OUTPATIENT)
Dept: SLEEP CENTER | Age: 59
Discharge: HOME | End: 2019-01-17
Payer: MEDICAID

## 2019-01-15 PROCEDURE — 95810 POLYSOM 6/> YRS 4/> PARAM: CPT

## 2019-01-16 ENCOUNTER — HOSPITAL ENCOUNTER (OUTPATIENT)
Dept: SLEEP CENTER | Age: 59
Discharge: HOME | End: 2019-01-18
Payer: MEDICAID

## 2019-01-16 PROCEDURE — 95811 POLYSOM 6/>YRS CPAP 4/> PARM: CPT

## 2019-01-17 ENCOUNTER — OFFICE VISIT (OUTPATIENT)
Dept: PODIATRY | Age: 59
End: 2019-01-17
Payer: MEDICAID

## 2019-01-17 VITALS — WEIGHT: 240 LBS | BODY MASS INDEX: 36.37 KG/M2 | HEIGHT: 68 IN

## 2019-01-17 DIAGNOSIS — M10.072 ACUTE IDIOPATHIC GOUT OF LEFT FOOT: Primary | ICD-10-CM

## 2019-01-17 DIAGNOSIS — M79.672 PAIN IN LEFT FOOT: ICD-10-CM

## 2019-01-17 PROCEDURE — 99213 OFFICE O/P EST LOW 20 MIN: CPT | Performed by: PODIATRIST

## 2019-01-17 PROCEDURE — G8417 CALC BMI ABV UP PARAM F/U: HCPCS | Performed by: PODIATRIST

## 2019-01-17 PROCEDURE — G8484 FLU IMMUNIZE NO ADMIN: HCPCS | Performed by: PODIATRIST

## 2019-01-17 PROCEDURE — 1036F TOBACCO NON-USER: CPT | Performed by: PODIATRIST

## 2019-01-17 PROCEDURE — 3017F COLORECTAL CA SCREEN DOC REV: CPT | Performed by: PODIATRIST

## 2019-01-17 PROCEDURE — G8427 DOCREV CUR MEDS BY ELIG CLIN: HCPCS | Performed by: PODIATRIST

## 2019-01-17 RX ORDER — BENZONATATE 100 MG/1
CAPSULE ORAL
Refills: 0 | COMMUNITY
Start: 2019-01-03 | End: 2019-03-19 | Stop reason: ALTCHOICE

## 2019-01-17 RX ORDER — LISINOPRIL 20 MG/1
TABLET ORAL
COMMUNITY
Start: 2014-12-01 | End: 2019-01-17 | Stop reason: SDUPTHER

## 2019-01-17 ASSESSMENT — ENCOUNTER SYMPTOMS
COLOR CHANGE: 0
BACK PAIN: 0
NAUSEA: 0
DIARRHEA: 0
SHORTNESS OF BREATH: 0

## 2019-01-18 ENCOUNTER — OFFICE VISIT (OUTPATIENT)
Dept: UROLOGY | Age: 59
End: 2019-01-18
Payer: MEDICAID

## 2019-01-18 ENCOUNTER — HOSPITAL ENCOUNTER (OUTPATIENT)
Age: 59
Discharge: HOME | End: 2019-01-18
Payer: MEDICAID

## 2019-01-18 VITALS
DIASTOLIC BLOOD PRESSURE: 81 MMHG | WEIGHT: 243 LBS | HEART RATE: 89 BPM | BODY MASS INDEX: 36.95 KG/M2 | SYSTOLIC BLOOD PRESSURE: 152 MMHG

## 2019-01-18 DIAGNOSIS — R39.12 WEAK URINARY STREAM: Primary | ICD-10-CM

## 2019-01-18 DIAGNOSIS — N40.1 BENIGN LOCALIZED PROSTATIC HYPERPLASIA WITH LOWER URINARY TRACT SYMPTOMS (LUTS): ICD-10-CM

## 2019-01-18 LAB
APPEARANCE FLUID: ABNORMAL
BILIRUBIN, POC: ABNORMAL
BLOOD URINE, POC: ABNORMAL
CLARITY, POC: CLEAR
COLOR, POC: YELLOW
GLUCOSE URINE, POC: ABNORMAL
KETONES, POC: ABNORMAL
LEUKOCYTE EST, POC: ABNORMAL
NITRITE, POC: ABNORMAL
PH, POC: 6.5
PROSTATE SPECIFIC ANTIGEN: 8.24 UG/L
PROTEIN, POC: ABNORMAL
SPECIFIC GRAVITY, POC: 1.02
UROBILINOGEN, POC: 0.2

## 2019-01-18 PROCEDURE — 36415 COLL VENOUS BLD VENIPUNCTURE: CPT

## 2019-01-18 PROCEDURE — 1036F TOBACCO NON-USER: CPT | Performed by: UROLOGY

## 2019-01-18 PROCEDURE — 3017F COLORECTAL CA SCREEN DOC REV: CPT | Performed by: UROLOGY

## 2019-01-18 PROCEDURE — G8417 CALC BMI ABV UP PARAM F/U: HCPCS | Performed by: UROLOGY

## 2019-01-18 PROCEDURE — 84153 ASSAY OF PSA TOTAL: CPT

## 2019-01-18 PROCEDURE — G8427 DOCREV CUR MEDS BY ELIG CLIN: HCPCS | Performed by: UROLOGY

## 2019-01-18 PROCEDURE — G8484 FLU IMMUNIZE NO ADMIN: HCPCS | Performed by: UROLOGY

## 2019-01-18 PROCEDURE — 99204 OFFICE O/P NEW MOD 45 MIN: CPT | Performed by: UROLOGY

## 2019-01-18 PROCEDURE — 81002 URINALYSIS NONAUTO W/O SCOPE: CPT | Performed by: UROLOGY

## 2019-01-18 RX ORDER — TAMSULOSIN HYDROCHLORIDE 0.4 MG/1
0.4 CAPSULE ORAL DAILY
Qty: 30 CAPSULE | Refills: 11 | Status: ON HOLD | OUTPATIENT
Start: 2019-01-18 | End: 2019-02-07

## 2019-01-28 ENCOUNTER — TELEPHONE (OUTPATIENT)
Dept: UROLOGY | Age: 59
End: 2019-01-28

## 2019-01-30 LAB
STATUS: NORMAL
STATUS: NORMAL

## 2019-02-07 ENCOUNTER — HOSPITAL ENCOUNTER (OUTPATIENT)
Age: 59
Setting detail: OUTPATIENT SURGERY
Discharge: HOME | End: 2019-02-07
Attending: UROLOGY | Admitting: UROLOGY
Payer: MEDICAID

## 2019-02-07 VITALS
HEART RATE: 74 BPM | WEIGHT: 245 LBS | HEIGHT: 68 IN | OXYGEN SATURATION: 100 % | BODY MASS INDEX: 37.13 KG/M2 | RESPIRATION RATE: 15 BRPM | SYSTOLIC BLOOD PRESSURE: 139 MMHG | TEMPERATURE: 99.7 F | DIASTOLIC BLOOD PRESSURE: 78 MMHG

## 2019-02-07 PROCEDURE — 6370000000 HC RX 637 (ALT 250 FOR IP): Performed by: UROLOGY

## 2019-02-07 PROCEDURE — 2709999900 HC NON-CHARGEABLE SUPPLY: Performed by: UROLOGY

## 2019-02-07 PROCEDURE — 7100000010 HC PHASE II RECOVERY - FIRST 15 MIN: Performed by: UROLOGY

## 2019-02-07 PROCEDURE — 3600000002 HC SURGERY LEVEL 2 BASE: Performed by: UROLOGY

## 2019-02-07 PROCEDURE — 3600000012 HC SURGERY LEVEL 2 ADDTL 15MIN: Performed by: UROLOGY

## 2019-02-07 RX ORDER — LORATADINE 10 MG/1
10 TABLET ORAL DAILY
COMMUNITY
End: 2020-05-27 | Stop reason: ALTCHOICE

## 2019-02-07 ASSESSMENT — PAIN SCALES - GENERAL: PAINLEVEL_OUTOF10: 0

## 2019-02-07 ASSESSMENT — PAIN - FUNCTIONAL ASSESSMENT
PAIN_FUNCTIONAL_ASSESSMENT: 0-10
PAIN_FUNCTIONAL_ASSESSMENT: 0-10

## 2019-02-25 ENCOUNTER — TELEPHONE (OUTPATIENT)
Dept: UROLOGY | Age: 59
End: 2019-02-25

## 2019-03-19 RX ORDER — MECLIZINE HYDROCHLORIDE 25 MG/1
25 TABLET ORAL 3 TIMES DAILY PRN
COMMUNITY
End: 2019-09-18 | Stop reason: ALTCHOICE

## 2019-03-19 RX ORDER — TAMSULOSIN HYDROCHLORIDE 0.4 MG/1
0.4 CAPSULE ORAL DAILY
COMMUNITY
End: 2019-05-28 | Stop reason: ALTCHOICE

## 2019-03-20 DIAGNOSIS — N40.1 BENIGN LOCALIZED PROSTATIC HYPERPLASIA WITH LOWER URINARY TRACT SYMPTOMS (LUTS): Primary | ICD-10-CM

## 2019-03-20 RX ORDER — CIPROFLOXACIN 500 MG/1
TABLET, FILM COATED ORAL
Qty: 6 TABLET | Refills: 0 | Status: SHIPPED | OUTPATIENT
Start: 2019-03-20 | End: 2019-05-28 | Stop reason: ALTCHOICE

## 2019-03-20 RX ORDER — CEPHALEXIN 500 MG/1
CAPSULE ORAL
Qty: 9 CAPSULE | Refills: 0 | Status: SHIPPED | OUTPATIENT
Start: 2019-03-20 | End: 2019-05-28 | Stop reason: ALTCHOICE

## 2019-03-21 ENCOUNTER — HOSPITAL ENCOUNTER (OUTPATIENT)
Age: 59
Setting detail: OUTPATIENT SURGERY
Discharge: HOME | End: 2019-03-21
Attending: UROLOGY | Admitting: UROLOGY
Payer: MEDICAID

## 2019-03-21 ENCOUNTER — HOSPITAL ENCOUNTER (OUTPATIENT)
Dept: ULTRASOUND IMAGING | Age: 59
Setting detail: OUTPATIENT SURGERY
Discharge: HOME | End: 2019-03-23
Attending: UROLOGY
Payer: MEDICAID

## 2019-03-21 VITALS
BODY MASS INDEX: 36.37 KG/M2 | SYSTOLIC BLOOD PRESSURE: 126 MMHG | RESPIRATION RATE: 16 BRPM | OXYGEN SATURATION: 99 % | TEMPERATURE: 97.7 F | WEIGHT: 240 LBS | DIASTOLIC BLOOD PRESSURE: 72 MMHG | HEIGHT: 68 IN | HEART RATE: 93 BPM

## 2019-03-21 PROCEDURE — 3600000012 HC SURGERY LEVEL 2 ADDTL 15MIN: Performed by: UROLOGY

## 2019-03-21 PROCEDURE — 6370000000 HC RX 637 (ALT 250 FOR IP): Performed by: UROLOGY

## 2019-03-21 PROCEDURE — 88305 TISSUE EXAM BY PATHOLOGIST: CPT

## 2019-03-21 PROCEDURE — 76872 US TRANSRECTAL: CPT

## 2019-03-21 PROCEDURE — 7100000040 HC SPAR PHASE II RECOVERY - FIRST 15 MIN: Performed by: UROLOGY

## 2019-03-21 PROCEDURE — 2709999900 HC NON-CHARGEABLE SUPPLY: Performed by: UROLOGY

## 2019-03-21 PROCEDURE — 7100000041 HC SPAR PHASE II RECOVERY - ADDTL 15 MIN: Performed by: UROLOGY

## 2019-03-21 PROCEDURE — 3600000002 HC SURGERY LEVEL 2 BASE: Performed by: UROLOGY

## 2019-03-21 PROCEDURE — 2500000003 HC RX 250 WO HCPCS: Performed by: UROLOGY

## 2019-03-21 PROCEDURE — 76942 ECHO GUIDE FOR BIOPSY: CPT

## 2019-03-21 RX ORDER — LIDOCAINE HYDROCHLORIDE 10 MG/ML
INJECTION, SOLUTION EPIDURAL; INFILTRATION; INTRACAUDAL; PERINEURAL PRN
Status: DISCONTINUED | OUTPATIENT
Start: 2019-03-21 | End: 2019-03-21 | Stop reason: ALTCHOICE

## 2019-03-21 ASSESSMENT — PAIN - FUNCTIONAL ASSESSMENT: PAIN_FUNCTIONAL_ASSESSMENT: 0-10

## 2019-03-21 ASSESSMENT — PAIN SCALES - GENERAL: PAINLEVEL_OUTOF10: 2

## 2019-03-22 LAB — SURGICAL PATHOLOGY REPORT: NORMAL

## 2019-03-29 ENCOUNTER — OFFICE VISIT (OUTPATIENT)
Dept: UROLOGY | Age: 59
End: 2019-03-29
Payer: MEDICAID

## 2019-03-29 VITALS
BODY MASS INDEX: 36.68 KG/M2 | WEIGHT: 242 LBS | HEART RATE: 83 BPM | DIASTOLIC BLOOD PRESSURE: 81 MMHG | HEIGHT: 68 IN | SYSTOLIC BLOOD PRESSURE: 144 MMHG

## 2019-03-29 DIAGNOSIS — N40.1 BENIGN LOCALIZED PROSTATIC HYPERPLASIA WITH LOWER URINARY TRACT SYMPTOMS (LUTS): ICD-10-CM

## 2019-03-29 DIAGNOSIS — R35.0 URINE FREQUENCY: ICD-10-CM

## 2019-03-29 DIAGNOSIS — C61 PROSTATE CANCER (HCC): Primary | ICD-10-CM

## 2019-03-29 LAB
BILIRUBIN, POC: ABNORMAL
BLOOD URINE, POC: ABNORMAL
CLARITY, POC: ABNORMAL
COLOR, POC: ABNORMAL
GLUCOSE URINE, POC: ABNORMAL
KETONES, POC: ABNORMAL
LEUKOCYTE EST, POC: ABNORMAL
NITRITE, POC: ABNORMAL
PH, POC: 6.5
PROTEIN, POC: ABNORMAL
SPECIFIC GRAVITY, POC: 1.02
UROBILINOGEN, POC: 0.2

## 2019-03-29 PROCEDURE — 3017F COLORECTAL CA SCREEN DOC REV: CPT | Performed by: UROLOGY

## 2019-03-29 PROCEDURE — G8417 CALC BMI ABV UP PARAM F/U: HCPCS | Performed by: UROLOGY

## 2019-03-29 PROCEDURE — G8484 FLU IMMUNIZE NO ADMIN: HCPCS | Performed by: UROLOGY

## 2019-03-29 PROCEDURE — 81002 URINALYSIS NONAUTO W/O SCOPE: CPT | Performed by: UROLOGY

## 2019-03-29 PROCEDURE — 1036F TOBACCO NON-USER: CPT | Performed by: UROLOGY

## 2019-03-29 PROCEDURE — G8427 DOCREV CUR MEDS BY ELIG CLIN: HCPCS | Performed by: UROLOGY

## 2019-03-29 PROCEDURE — 99215 OFFICE O/P EST HI 40 MIN: CPT | Performed by: UROLOGY

## 2019-04-04 ENCOUNTER — HOSPITAL ENCOUNTER (OUTPATIENT)
Age: 59
Setting detail: OBSERVATION
Discharge: HOME | End: 2019-04-05
Attending: EMERGENCY MEDICINE | Admitting: EMERGENCY MEDICINE
Payer: MEDICAID

## 2019-04-04 ENCOUNTER — APPOINTMENT (OUTPATIENT)
Dept: CT IMAGING | Age: 59
End: 2019-04-04
Payer: MEDICAID

## 2019-04-04 DIAGNOSIS — C61 PROSTATE CANCER (HCC): ICD-10-CM

## 2019-04-04 DIAGNOSIS — N30.01 ACUTE CYSTITIS WITH HEMATURIA: Primary | ICD-10-CM

## 2019-04-04 LAB
-: ABNORMAL
ABSOLUTE EOS #: 0.26 K/UL (ref 0–0.44)
ABSOLUTE IMMATURE GRANULOCYTE: 0.04 K/UL (ref 0–0.3)
ABSOLUTE LYMPH #: 2.6 K/UL (ref 1.1–3.7)
ABSOLUTE MONO #: 0.61 K/UL (ref 0.1–1.2)
ALBUMIN SERPL-MCNC: 4.2 G/DL (ref 3.5–5.2)
ALBUMIN/GLOBULIN RATIO: 1.1 (ref 1–2.5)
ALP BLD-CCNC: 80 U/L (ref 40–129)
ALT SERPL-CCNC: 30 U/L (ref 5–41)
AMORPHOUS: ABNORMAL
ANION GAP SERPL CALCULATED.3IONS-SCNC: 14 MMOL/L (ref 9–17)
AST SERPL-CCNC: 19 U/L
BACTERIA: ABNORMAL
BASOPHILS # BLD: 1 % (ref 0–2)
BASOPHILS ABSOLUTE: 0.05 K/UL (ref 0–0.2)
BILIRUB SERPL-MCNC: 0.5 MG/DL (ref 0.3–1.2)
BILIRUBIN URINE: NEGATIVE
BUN BLDV-MCNC: 13 MG/DL (ref 6–20)
BUN/CREAT BLD: ABNORMAL (ref 9–20)
CALCIUM SERPL-MCNC: 9.2 MG/DL (ref 8.6–10.4)
CASTS UA: ABNORMAL /LPF (ref 0–2)
CHLORIDE BLD-SCNC: 105 MMOL/L (ref 98–107)
CO2: 22 MMOL/L (ref 20–31)
COLOR: ABNORMAL
CREAT SERPL-MCNC: 0.85 MG/DL (ref 0.7–1.2)
CRYSTALS, UA: ABNORMAL /HPF
CRYSTALS, UA: ABNORMAL /HPF
DIFFERENTIAL TYPE: NORMAL
EOSINOPHILS RELATIVE PERCENT: 3 % (ref 1–4)
EPITHELIAL CELLS UA: ABNORMAL /HPF (ref 0–5)
GFR AFRICAN AMERICAN: >60 ML/MIN
GFR NON-AFRICAN AMERICAN: >60 ML/MIN
GFR SERPL CREATININE-BSD FRML MDRD: ABNORMAL ML/MIN/{1.73_M2}
GFR SERPL CREATININE-BSD FRML MDRD: ABNORMAL ML/MIN/{1.73_M2}
GLUCOSE BLD-MCNC: 129 MG/DL (ref 70–99)
GLUCOSE URINE: NEGATIVE
HCT VFR BLD CALC: 45.6 % (ref 40.7–50.3)
HEMOGLOBIN: 14.7 G/DL (ref 13–17)
IMMATURE GRANULOCYTES: 0 %
KETONES, URINE: NEGATIVE
LEUKOCYTE ESTERASE, URINE: ABNORMAL
LYMPHOCYTES # BLD: 27 % (ref 24–43)
MCH RBC QN AUTO: 28.1 PG (ref 25.2–33.5)
MCHC RBC AUTO-ENTMCNC: 32.2 G/DL (ref 28.4–34.8)
MCV RBC AUTO: 87 FL (ref 82.6–102.9)
MONOCYTES # BLD: 6 % (ref 3–12)
MUCUS: ABNORMAL
NITRITE, URINE: POSITIVE
NRBC AUTOMATED: 0 PER 100 WBC
OTHER OBSERVATIONS UA: ABNORMAL
PDW BLD-RTO: 13 % (ref 11.8–14.4)
PH UA: 5 (ref 5–8)
PLATELET # BLD: 292 K/UL (ref 138–453)
PLATELET ESTIMATE: NORMAL
PMV BLD AUTO: 11.3 FL (ref 8.1–13.5)
POTASSIUM SERPL-SCNC: 4 MMOL/L (ref 3.7–5.3)
PROTEIN UA: ABNORMAL
RBC # BLD: 5.24 M/UL (ref 4.21–5.77)
RBC # BLD: NORMAL 10*6/UL
RBC UA: ABNORMAL /HPF (ref 0–2)
RENAL EPITHELIAL, UA: ABNORMAL /HPF
SEG NEUTROPHILS: 63 % (ref 36–65)
SEGMENTED NEUTROPHILS ABSOLUTE COUNT: 6.03 K/UL (ref 1.5–8.1)
SODIUM BLD-SCNC: 141 MMOL/L (ref 135–144)
SPECIFIC GRAVITY UA: 1.03 (ref 1–1.03)
TOTAL PROTEIN: 8 G/DL (ref 6.4–8.3)
TRICHOMONAS: ABNORMAL
TURBIDITY: ABNORMAL
URINE HGB: ABNORMAL
UROBILINOGEN, URINE: NORMAL
WBC # BLD: 9.6 K/UL (ref 3.5–11.3)
WBC # BLD: NORMAL 10*3/UL
WBC UA: ABNORMAL /HPF (ref 0–5)
YEAST: ABNORMAL

## 2019-04-04 PROCEDURE — 87088 URINE BACTERIA CULTURE: CPT

## 2019-04-04 PROCEDURE — 6360000004 HC RX CONTRAST MEDICATION: Performed by: EMERGENCY MEDICINE

## 2019-04-04 PROCEDURE — 74177 CT ABD & PELVIS W/CONTRAST: CPT

## 2019-04-04 PROCEDURE — 96365 THER/PROPH/DIAG IV INF INIT: CPT

## 2019-04-04 PROCEDURE — 2580000003 HC RX 258: Performed by: EMERGENCY MEDICINE

## 2019-04-04 PROCEDURE — 81001 URINALYSIS AUTO W/SCOPE: CPT

## 2019-04-04 PROCEDURE — 87186 SC STD MICRODIL/AGAR DIL: CPT

## 2019-04-04 PROCEDURE — 99284 EMERGENCY DEPT VISIT MOD MDM: CPT

## 2019-04-04 PROCEDURE — 87086 URINE CULTURE/COLONY COUNT: CPT

## 2019-04-04 PROCEDURE — 6370000000 HC RX 637 (ALT 250 FOR IP): Performed by: EMERGENCY MEDICINE

## 2019-04-04 PROCEDURE — 80053 COMPREHEN METABOLIC PANEL: CPT

## 2019-04-04 PROCEDURE — 85025 COMPLETE CBC W/AUTO DIFF WBC: CPT

## 2019-04-04 PROCEDURE — 96375 TX/PRO/DX INJ NEW DRUG ADDON: CPT

## 2019-04-04 PROCEDURE — 6360000002 HC RX W HCPCS: Performed by: EMERGENCY MEDICINE

## 2019-04-04 RX ORDER — 0.9 % SODIUM CHLORIDE 0.9 %
500 INTRAVENOUS SOLUTION INTRAVENOUS ONCE
Status: COMPLETED | OUTPATIENT
Start: 2019-04-04 | End: 2019-04-04

## 2019-04-04 RX ORDER — MORPHINE SULFATE 4 MG/ML
4 INJECTION, SOLUTION INTRAMUSCULAR; INTRAVENOUS ONCE
Status: COMPLETED | OUTPATIENT
Start: 2019-04-04 | End: 2019-04-04

## 2019-04-04 RX ORDER — ONDANSETRON 2 MG/ML
4 INJECTION INTRAMUSCULAR; INTRAVENOUS ONCE
Status: COMPLETED | OUTPATIENT
Start: 2019-04-04 | End: 2019-04-04

## 2019-04-04 RX ORDER — PHENAZOPYRIDINE HYDROCHLORIDE 100 MG/1
200 TABLET, FILM COATED ORAL ONCE
Status: COMPLETED | OUTPATIENT
Start: 2019-04-05 | End: 2019-04-04

## 2019-04-04 RX ADMIN — ONDANSETRON 4 MG: 2 INJECTION INTRAMUSCULAR; INTRAVENOUS at 21:16

## 2019-04-04 RX ADMIN — MORPHINE SULFATE 4 MG: 4 INJECTION INTRAVENOUS at 21:16

## 2019-04-04 RX ADMIN — CEFTRIAXONE SODIUM 1 G: 1 INJECTION, POWDER, FOR SOLUTION INTRAMUSCULAR; INTRAVENOUS at 22:21

## 2019-04-04 RX ADMIN — PHENAZOPYRIDINE HYDROCHLORIDE 200 MG: 100 TABLET ORAL at 23:54

## 2019-04-04 RX ADMIN — SODIUM CHLORIDE 500 ML: 9 INJECTION, SOLUTION INTRAVENOUS at 21:15

## 2019-04-04 RX ADMIN — IOVERSOL 75 ML: 741 INJECTION INTRA-ARTERIAL; INTRAVENOUS at 22:10

## 2019-04-04 ASSESSMENT — ENCOUNTER SYMPTOMS
BLOOD IN STOOL: 0
SORE THROAT: 0
DIARRHEA: 1
VOMITING: 0
SHORTNESS OF BREATH: 0
ABDOMINAL PAIN: 0
NAUSEA: 1

## 2019-04-04 ASSESSMENT — PAIN DESCRIPTION - DESCRIPTORS: DESCRIPTORS: STABBING;BURNING

## 2019-04-04 ASSESSMENT — PAIN DESCRIPTION - PAIN TYPE: TYPE: ACUTE PAIN

## 2019-04-04 ASSESSMENT — PAIN SCALES - GENERAL
PAINLEVEL_OUTOF10: 10
PAINLEVEL_OUTOF10: 10

## 2019-04-05 ENCOUNTER — TELEPHONE (OUTPATIENT)
Dept: UROLOGY | Age: 59
End: 2019-04-05

## 2019-04-05 VITALS
TEMPERATURE: 97 F | HEIGHT: 68 IN | DIASTOLIC BLOOD PRESSURE: 65 MMHG | OXYGEN SATURATION: 99 % | SYSTOLIC BLOOD PRESSURE: 131 MMHG | BODY MASS INDEX: 36.68 KG/M2 | WEIGHT: 242 LBS | RESPIRATION RATE: 16 BRPM | HEART RATE: 78 BPM

## 2019-04-05 PROBLEM — R30.0 DYSURIA: Status: ACTIVE | Noted: 2019-04-05

## 2019-04-05 PROCEDURE — 2580000003 HC RX 258: Performed by: EMERGENCY MEDICINE

## 2019-04-05 PROCEDURE — G0378 HOSPITAL OBSERVATION PER HR: HCPCS

## 2019-04-05 PROCEDURE — 6370000000 HC RX 637 (ALT 250 FOR IP): Performed by: EMERGENCY MEDICINE

## 2019-04-05 RX ORDER — SULFAMETHOXAZOLE AND TRIMETHOPRIM 800; 160 MG/1; MG/1
1 TABLET ORAL 2 TIMES DAILY
Qty: 28 TABLET | Refills: 0 | Status: SHIPPED | OUTPATIENT
Start: 2019-04-05 | End: 2019-04-19

## 2019-04-05 RX ORDER — OXYCODONE HYDROCHLORIDE 5 MG/1
10 TABLET ORAL EVERY 4 HOURS PRN
Status: DISCONTINUED | OUTPATIENT
Start: 2019-04-05 | End: 2019-04-05 | Stop reason: HOSPADM

## 2019-04-05 RX ORDER — PHENAZOPYRIDINE HYDROCHLORIDE 100 MG/1
200 TABLET, FILM COATED ORAL
Status: DISCONTINUED | OUTPATIENT
Start: 2019-04-05 | End: 2019-04-05 | Stop reason: HOSPADM

## 2019-04-05 RX ORDER — ONDANSETRON 4 MG/1
4 TABLET, ORALLY DISINTEGRATING ORAL EVERY 8 HOURS PRN
Status: DISCONTINUED | OUTPATIENT
Start: 2019-04-05 | End: 2019-04-05 | Stop reason: HOSPADM

## 2019-04-05 RX ORDER — SODIUM CHLORIDE 0.9 % (FLUSH) 0.9 %
10 SYRINGE (ML) INJECTION EVERY 12 HOURS SCHEDULED
Status: DISCONTINUED | OUTPATIENT
Start: 2019-04-05 | End: 2019-04-05 | Stop reason: HOSPADM

## 2019-04-05 RX ORDER — ACETAMINOPHEN 325 MG/1
650 TABLET ORAL EVERY 4 HOURS PRN
Status: DISCONTINUED | OUTPATIENT
Start: 2019-04-05 | End: 2019-04-05 | Stop reason: HOSPADM

## 2019-04-05 RX ORDER — OXYCODONE HYDROCHLORIDE 5 MG/1
5 TABLET ORAL EVERY 4 HOURS PRN
Status: DISCONTINUED | OUTPATIENT
Start: 2019-04-05 | End: 2019-04-05 | Stop reason: HOSPADM

## 2019-04-05 RX ORDER — MORPHINE SULFATE 2 MG/ML
2 INJECTION, SOLUTION INTRAMUSCULAR; INTRAVENOUS
Status: DISCONTINUED | OUTPATIENT
Start: 2019-04-05 | End: 2019-04-05 | Stop reason: HOSPADM

## 2019-04-05 RX ORDER — SODIUM CHLORIDE 0.9 % (FLUSH) 0.9 %
10 SYRINGE (ML) INJECTION PRN
Status: DISCONTINUED | OUTPATIENT
Start: 2019-04-05 | End: 2019-04-05 | Stop reason: HOSPADM

## 2019-04-05 RX ORDER — OXYCODONE HYDROCHLORIDE AND ACETAMINOPHEN 5; 325 MG/1; MG/1
1 TABLET ORAL EVERY 6 HOURS PRN
Qty: 12 TABLET | Refills: 0 | Status: SHIPPED | OUTPATIENT
Start: 2019-04-05 | End: 2019-04-08

## 2019-04-05 RX ORDER — PHENAZOPYRIDINE HYDROCHLORIDE 200 MG/1
200 TABLET, FILM COATED ORAL
Qty: 15 TABLET | Refills: 0 | Status: SHIPPED | OUTPATIENT
Start: 2019-04-05 | End: 2019-04-10

## 2019-04-05 RX ORDER — TAMSULOSIN HYDROCHLORIDE 0.4 MG/1
0.4 CAPSULE ORAL DAILY
Status: DISCONTINUED | OUTPATIENT
Start: 2019-04-05 | End: 2019-04-05 | Stop reason: HOSPADM

## 2019-04-05 RX ORDER — CETIRIZINE HYDROCHLORIDE 10 MG/1
10 TABLET ORAL DAILY
Status: DISCONTINUED | OUTPATIENT
Start: 2019-04-05 | End: 2019-04-05 | Stop reason: HOSPADM

## 2019-04-05 RX ORDER — LISINOPRIL AND HYDROCHLOROTHIAZIDE 20; 12.5 MG/1; MG/1
1 TABLET ORAL DAILY
Status: DISCONTINUED | OUTPATIENT
Start: 2019-04-05 | End: 2019-04-05 | Stop reason: HOSPADM

## 2019-04-05 RX ADMIN — PHENAZOPYRIDINE HYDROCHLORIDE 200 MG: 100 TABLET ORAL at 08:39

## 2019-04-05 RX ADMIN — LISINOPRIL AND HYDROCHLOROTHIAZIDE 1 TABLET: 12.5; 2 TABLET ORAL at 08:39

## 2019-04-05 RX ADMIN — OXYCODONE HYDROCHLORIDE 10 MG: 5 TABLET ORAL at 12:20

## 2019-04-05 RX ADMIN — CETIRIZINE HYDROCHLORIDE 10 MG: 10 TABLET ORAL at 08:39

## 2019-04-05 RX ADMIN — Medication 10 ML: at 08:48

## 2019-04-05 RX ADMIN — TAMSULOSIN HYDROCHLORIDE 0.4 MG: 0.4 CAPSULE ORAL at 08:39

## 2019-04-05 RX ADMIN — PHENAZOPYRIDINE HYDROCHLORIDE 200 MG: 100 TABLET ORAL at 14:24

## 2019-04-05 RX ADMIN — OXYCODONE HYDROCHLORIDE 10 MG: 5 TABLET ORAL at 05:31

## 2019-04-05 ASSESSMENT — PAIN SCALES - GENERAL
PAINLEVEL_OUTOF10: 9
PAINLEVEL_OUTOF10: 7
PAINLEVEL_OUTOF10: 5

## 2019-04-05 NOTE — ED NOTES
Pt resting in bed, states no needs at this time. Will continue to monitor.       Matthew Jefferson RN  04/04/19 [id number]

## 2019-04-05 NOTE — ED NOTES
Pt resting in bed, states no needs at this time. Will continue to monitor.       Wes Perkins RN  04/04/19 1987

## 2019-04-05 NOTE — CONSULTS
Sharifa Lawrence, Salvatore Raphael, She mistry, Jesus Gracia, & [address] [facility], [address] [facility]  Urology Consult    Patient:  Daniela Deras  MRN: [id number]  YOB: 1960    CHIEF COMPLAINT:  Dysuria    HISTORY OF PRESENT ILLNESS:   The patient is a 66 y.o. male who presents with dysuria for 5 days that has gotten severe. No fevers and no rectal pain. He had a prostate biopsy with Dr. Dada Levine on 3/21/19 that demonstrated [place] 8 prostate cancer. He was scheduled for CT scan and bone scan next week. He is off antibiotics. He is on flomax which has helped with in his frequency LUTS. He otherwise has no urologic history. Patient's old records, notes and chart reviewed and summarized above. Past Medical History:    Past Medical History:   Diagnosis Date    Hematuria     Hypertension     DILAN (obstructive sleep apnea)     DOES NOT USE MACHINE       Past Surgical History:    Past Surgical History:   Procedure Laterality Date    APPENDECTOMY      CYSTOSCOPY N/A 2/7/2019    CYSTOSCOPY performed by Marine Castellanos MD at [address] [facility] Right 2004    NECK    PROSTATE BIOPSY N/A 3/21/2019    PROSTATE BIOPSY, ULTRASOUND  (OFFICE TO CONTACT [place] performed by Marine Castellanos MD at [address] SEPTOPLASTY         Medications:    Scheduled Meds:   lisinopril-hydrochlorothiazide  1 tablet Oral Daily    cetirizine  10 mg Oral Daily    tamsulosin  0.4 mg Oral Daily    sodium chloride flush  10 mL Intravenous 2 times per day    cefTRIAXone (ROCEPHIN) IV  1 g Intravenous Q24H    phenazopyridine  200 mg Oral TID WC     Continuous Infusions:  PRN Meds:.sodium chloride flush, acetaminophen, oxyCODONE **OR** oxyCODONE, morphine, ondansetron    Allergies:  Patient has no known allergies.     Social History:    Social History     Socioeconomic History    Marital status: Single     Spouse name: Not on file    Number of children: Not on file    Years of education: Not on file    Highest education level: Not on file   Occupational History  Not on file   Social Needs    Financial resource strain: Not on file    Food insecurity:     Worry: Not on file     Inability: Not on file    Transportation needs:     Medical: Not on file     Non-medical: Not on file   Tobacco Use    Smoking status: Never Smoker    Smokeless tobacco: Never Used   Substance and Sexual Activity    Alcohol use: No    Drug use: No    Sexual activity: Not on file   Lifestyle    Physical activity:     Days per week: Not on file     Minutes per session: Not on file    Stress: Not on file   Relationships    Social connections:     Talks on phone: Not on file     Gets together: Not on file     Attends Mu-ism service: Not on file     Active member of club or organization: Not on file     Attends meetings of clubs or organizations: Not on file     Relationship status: Not on file    Intimate partner violence:     Fear of current or ex partner: Not on file     Emotionally abused: Not on file     Physically abused: Not on file     Forced sexual activity: Not on file   Other Topics Concern    Not on file   Social History Narrative    Not on file       Family History:    Family History   Problem Relation Age of Onset    High Blood Pressure Mother        Previous Urologic Family history: none    REVIEW OF SYSTEMS:    Constitutional: negative  Eyes: negative  Respiratory: negative  Cardiovascular: negative  Gastrointestinal: negative  Genitourinary: see HPI  Musculoskeletal: negative  Skin: negative   Neurological: negative  Hematological/Lymphatic: negative  Psychological: negative    Physical Exam:    This a 66 y.o. male   Patient Vitals for the past 24 hrs:   BP Temp Temp src Pulse Resp SpO2 Height Weight   04/05/19 0100 136/84 98.4 °F (36.9 °C) Oral 76 16 98 % 5' 8\" (1.727 m) 242 lb (109.8 kg)   04/04/19 2324 (!) 140/62 -- -- -- -- 94 % -- --   04/04/19 2315 -- -- -- -- -- 96 % -- --   04/04/19 2120 -- -- -- -- -- 95 % -- --   04/04/19 2027 (!) 181/92 97.8 °F (36.6 °C) Oral 93 14 95 % -- --       Constitutional: Patient in no acute distress; Neuro: alert and oriented to person place and time. Psych: Mood and affect normal.  Skin: Normal  Lungs: Respiratory effort normal  Cardiovascular:  RRR. Normal peripheral pulses  Abdomen: Soft, non-tender, non-distended  No CVA tenderness  Bladder non-tender and not distended. Lower extremities no edema or calf tenderness bilaterally    :  Penis normal and uncircumcised  Urethral meatus normal  Scrotal exam normal  Testicles normal bilaterally  Prostate soft, non-tender to palpation. No palpable nodules. Estimated 30 grams. LABS:    Recent Labs     04/04/19 2117   WBC 9.6   HGB 14.7   HCT 45.6   MCV 87.0        Recent Labs     04/04/19 2117      K 4.0      CO2 22   BUN 13   CREATININE 0.85     Lab Results   Component Value Date    PSA 8.24 (H) 01/18/2019    PSA 2.49 01/02/2015       Additional Lab/culture results:    Urinalysis:   Recent Labs     04/04/19 2123   COLORU DARK YELLOW*   PHUR 5.0   WBCUA 5 TO 10   RBCUA 20 TO 50   MUCUS 1+*   TRICHOMONAS NOT REPORTED   YEAST NOT REPORTED   BACTERIA FEW*   SPECGRAV 1.034*   LEUKOCYTESUR TRACE*   UROBILINOGEN Normal   BILIRUBINUR NEGATIVE        -----------------------------------------------------------------  Imaging Results:  Imaging was independently reviewed and confirmed with report. 4/4/19 Ct abdomen and pelvis with contrast  - No prostate abscess  - Prostate calculi  - Thickened bladder wall    Assessment and Plan     Impression:      The patient is a 66 y.o. male  admitted with dysuria     Problem List  - UTI  - BPH with LUTS  - Prostate Cancer, [place] [address]    Plan:    - Bladder scan was zero  - CT scan shows no evidence of prostate abscess  - Continue Flomax  - Follow up urine culture  - Ceftriaxone 1 g daily.  Can switch to Bactrim DS BID x 14 days for home if feeling better and no fevers today      Patsyrogereloiperfecto Yonathan  6:31 AM 4/5/2019

## 2019-04-05 NOTE — CARE COORDINATION
Case Management Initial Discharge Plan  [place],             Met with:patient to discuss discharge plans. Information verified: address, contacts, phone number, , insurance Yes  PCP:  Leah Caceres  Date of last visit: [address] Provider: [facility]    Discharge Planning    Living Arrangements:  Alone   Support Systems:  Family Members    Home has 2 stories  5 stairs to climb to get into front door, 12 stairs to climb to reach second floor  Location of bedroom/bathroom in home second floor bed and bath    Patient able to perform ADL's:Independent    Current Services (outpatient & in home) No  DME equipment: No  DME provider: N/A    Pharmacy: [facility] Medications:  No  Does patient want to participate in local refill/ meds to beds program?  No    Potential Assistance Needed:  N/A    Patient agreeable to home care: No  Melvin of choice provided:  n/a    Prior SNF/Rehab Placement and Facility: No  Agreeable to SNF/Rehab: No  [place] of choice provided: n/a   Evaluation: no    Expected Discharge date:  19  Patient expects to be discharged to:  home  Follow Up Appointment: Best Day/ Time:     Transportation provider: Patient drove to hospital. His car remains here. Transportation arrangements needed for discharge: No    Readmission Risk              Risk of Unplanned Readmission:        12             Does patient have a readmission risk score greater than 14?: No  If yes, follow-up appointment must be made within 7 days of discharge. Discharge Plan: Home independently.           Electronically signed by Alisia Muñoz RN on 19 at 9:36 AM

## 2019-04-05 NOTE — ED NOTES
Pt states he is feeling much better since given pain medication. Pt states he really only has the extreme pain when he has the urge to urinate.       Ivette Prince RN  04/04/19 4144

## 2019-04-05 NOTE — ED NOTES
Pt resting in bed, warm blanket given, no other needs stated at this time.  Will continue to monitor      Eden Baker RN  04/04/19 2483

## 2019-04-05 NOTE — ED PROVIDER NOTES
[facility] ED  Emergency Department Encounter  Emergency Medicine Resident     Patient Name: Daniela Deras  MRN: [id number]  [place]: 1960  Date of evaluation: 4/4/19  PCP:  CARMELA Freeman CNP    CHIEF COMPLAINT       Chief Complaint   Patient presents with    Hematuria     last 4 days        HISTORY OF PRESENT ILLNESS  (Location/Symptom, Timing/Onset, Context/Setting, Quality, Duration, Modifying Factors, Severity.)      Daniela Deras is a 66 y.o. male who presents with 3 days of hematuria and left flank pain. Patient had similar symptoms a few weeks ago, and has recently been diagnosed with prostate cancer. Patient also has dysuria and penile pain. Chief complaint is hematuria, onset 3 days ago, duration is constant since onset, severity is severe, quality is sharp, context is prostate biopsy 2 weeks ago and recent diagnosis of prostate cancer. PAST MEDICAL / SURGICAL / SOCIAL / FAMILY HISTORY      has a past medical history of Hematuria, Hypertension, and DILAN (obstructive sleep apnea).     has a past surgical history that includes Appendectomy; Cystoscopy (N/A, 2/7/2019); lymph node dissection (Right, 2004); Septoplasty; and Prostate biopsy (N/A, 3/21/2019).     Social History     Socioeconomic History    Marital status: Single     Spouse name: Not on file    Number of children: Not on file    Years of education: Not on file    Highest education level: Not on file   Occupational History    Not on file   Social Needs    Financial resource strain: Not on file    Food insecurity:     Worry: Not on file     Inability: Not on file    Transportation needs:     Medical: Not on file     Non-medical: Not on file   Tobacco Use    Smoking status: Never Smoker    Smokeless tobacco: Never Used   Substance and Sexual Activity    Alcohol use: No    Drug use: No    Sexual activity: Not on file   Lifestyle    Physical activity:     Days per week: Not on file     Minutes per session: Not on file    Stress: Not on file   Relationships    Social connections:     Talks on phone: Not on file     Gets together: Not on file     Attends Hinduism service: Not on file     Active member of club or organization: Not on file     Attends meetings of clubs or organizations: Not on file     Relationship status: Not on file    Intimate partner violence:     Fear of current or ex partner: Not on file     Emotionally abused: Not on file     Physically abused: Not on file     Forced sexual activity: Not on file   Other Topics Concern    Not on file   Social History Narrative    Not on file       Family History   Problem Relation Age of Onset    High Blood Pressure Mother      Allergies:  Patient has no known allergies. Home Medications:  Prior to Admission medications    Medication Sig Start Date End Date Taking? Authorizing Provider   meclizine (ANTIVERT) 25 MG tablet Take 25 mg by mouth 3 times daily as needed for Dizziness   Yes Historical Provider, MD   loratadine (CLARITIN) 10 MG tablet Take 10 mg by mouth daily   Yes Historical Provider, MD   lisinopril-hydrochlorothiazide (PRINZIDE;ZESTORETIC) 20-12.5 MG per tablet TK 1 T PO DAILY 1/14/18  Yes Historical Provider, MD   ciprofloxacin (CIPRO) 500 MG tablet Take 1 tablet by mouth 2 times daily Take first dose night prior to prostate biopsy and then twice a day thereafter until completed 3/20/19   Shorty Dougherty MD   cephALEXin (KEFLEX) 500 MG capsule Take 1 capsule by mouth 3 times daily Take first dose night prior to prostate biopsy and then three times a day thereafter until completed 3/20/19   Shorty Dougherty MD   tamsulosin (FLOMAX) 0.4 MG capsule Take 0.4 mg by mouth daily    Historical Provider, MD       REVIEW OF SYSTEMS    (2-9 systems for level 4, 10 or more for level 5)      Review of Systems   Constitutional: Positive for fever. Negative for chills. HENT: Negative for sore throat. Respiratory: Negative for shortness of breath. Cardiovascular: Negative for chest pain. Gastrointestinal: Positive for diarrhea and nausea. Negative for abdominal pain, blood in stool and vomiting. Genitourinary: Positive for dysuria, hematuria and penile pain. Musculoskeletal: Negative for myalgias. Skin: Negative for wound. Neurological: Negative for syncope. Hematological: Does not bruise/bleed easily. PHYSICAL EXAM   (up to 7 for level 4, 8 or more for level 5)      INITIAL VITALS:   BP (!) 140/62   Pulse 93   Temp 97.8 °F (36.6 °C) (Oral)   Resp 14   SpO2 94%     Physical Exam   Constitutional: He is oriented to person, place, and time. He appears well-developed and well-nourished. He appears distressed (patient appears uncomfortable). HENT:   Head: Normocephalic and atraumatic. Eyes: Right eye exhibits no discharge. Left eye exhibits no discharge. Neck: No tracheal deviation present. Cardiovascular: Normal rate, regular rhythm and normal heart sounds. Pulmonary/Chest: Effort normal and breath sounds normal. No stridor. No respiratory distress. He has no wheezes. He has no rales. Abdominal: Soft. Bowel sounds are normal. He exhibits no distension and no mass. There is no tenderness. There is no rebound and no guarding. Genitourinary:   Genitourinary Comments: Patient declined  exam   Musculoskeletal: Normal range of motion. He exhibits no deformity. Neurological: He is alert and oriented to person, place, and time. Skin: Skin is warm and dry. No rash noted. He is not diaphoretic. Psychiatric: He has a normal mood and affect.  His speech is normal and behavior is normal.       WORK-UP     PLAN (LABS / IMAGING /EKG):  Orders Placed This Encounter   Procedures    Urine Culture    CT ABDOMEN PELVIS W IV CONTRAST Additional Contrast? None    CBC Auto Differential    Comprehensive Metabolic Panel w/ Reflex to MG    Urinalysis with Microscopic    Inpatient consult to Urology    Saline lock IV    PATIENT STATUS (FROM ED OR OR/PROCEDURAL) Observation       MEDICATIONS ORDERED:  Orders Placed This Encounter   Medications    0.9 % sodium chloride bolus    morphine injection 4 mg    ondansetron (ZOFRAN) injection 4 mg    ioversol (OPTIRAY) 74 % injection 75 mL    cefTRIAXone (ROCEPHIN) 1 g IVPB in 50 mL D5W minibag    phenazopyridine (PYRIDIUM) tablet 200 mg       DIAGNOSTIC RESULTS / EMERGENCY DEPARTMENT COURSE /MDM / DIFFERENTIAL DIAGNOSIS     LABS:  Results for orders placed or performed during the hospital encounter of 04/04/19   CBC Auto Differential   Result Value Ref Range    WBC 9.6 3.5 - 11.3 k/uL    RBC 5.24 4.21 - 5.77 m/uL    Hemoglobin 14.7 13.0 - 17.0 g/dL    Hematocrit 45.6 40.7 - 50.3 %    MCV 87.0 82.6 - 102.9 fL    MCH 28.1 25.2 - 33.5 pg    MCHC 32.2 28.4 - 34.8 g/dL    RDW 13.0 11.8 - 14.4 %    Platelets 188 057 - 726 k/uL    MPV 11.3 8.1 - 13.5 fL    NRBC Automated 0.0 0.0 per 100 WBC    Differential Type NOT REPORTED     Seg Neutrophils 63 36 - 65 %    Lymphocytes 27 24 - 43 %    Monocytes 6 3 - 12 %    Eosinophils % 3 1 - 4 %    Basophils 1 0 - 2 %    Immature Granulocytes 0 0 %    Segs Absolute 6.03 1.50 - 8.10 k/uL    Absolute Lymph # 2.60 1.10 - 3.70 k/uL    Absolute Mono # 0.61 0.10 - 1.20 k/uL    Absolute Eos # 0.26 0.00 - 0.44 k/uL    Basophils # 0.05 0.00 - 0.20 k/uL    Absolute Immature Granulocyte 0.04 0.00 - 0.30 k/uL    WBC Morphology NOT REPORTED     RBC Morphology NOT REPORTED     Platelet Estimate NOT REPORTED    Comprehensive Metabolic Panel w/ Reflex to MG   Result Value Ref Range    Glucose 129 (H) 70 - 99 mg/dL    BUN 13 6 - 20 mg/dL    CREATININE 0.85 0.70 - 1.20 mg/dL    Bun/Cre Ratio NOT REPORTED 9 - 20    Calcium 9.2 8.6 - 10.4 mg/dL    Sodium 141 135 - 144 mmol/L    Potassium 4.0 3.7 - 5.3 mmol/L    Chloride 105 98 - 107 mmol/L    CO2 22 20 - 31 mmol/L    Anion Gap 14 9 - 17 mmol/L    Alkaline Phosphatase 80 40 - 129 U/L    ALT 30 5 - 41 U/L    AST 19 <40 U/L    Total Bilirubin 0.50 0.3 - 1.2 mg/dL    Total Protein 8.0 6.4 - 8.3 g/dL    Alb 4.2 3.5 - 5.2 g/dL    Albumin/Globulin Ratio 1.1 1.0 - 2.5    GFR Non-African American >60 >60 mL/min    GFR African American >60 >60 mL/min    GFR Comment          GFR Staging NOT REPORTED    Urinalysis with Microscopic   Result Value Ref Range    Color, UA DARK YELLOW (A) YELLOW    Turbidity UA CLOUDY (A) CLEAR    Glucose, Ur NEGATIVE NEGATIVE    Bilirubin Urine NEGATIVE NEGATIVE    Ketones, Urine NEGATIVE NEGATIVE    Specific Gravity, UA 1.034 (H) 1.005 - 1.030    Urine Hgb LARGE (A) NEGATIVE    pH, UA 5.0 5.0 - 8.0    Protein, UA 3+ (A) NEGATIVE    Urobilinogen, Urine Normal Normal    Nitrite, Urine POSITIVE (A) NEGATIVE    Leukocyte Esterase, Urine TRACE (A) NEGATIVE    -          WBC, UA 5 TO 10 0 - 5 /HPF    RBC, UA 20 TO 50 0 - 2 /HPF    Casts UA NOT REPORTED 0 - 2 /LPF    Crystals UA CALCIUM OXALATE (A) None /HPF    Crystals UA FEW (A) None /HPF    Epithelial Cells UA None 0 - 5 /HPF    Renal Epithelial, Urine NOT REPORTED 0 /HPF    Bacteria, UA FEW (A) None    Mucus, UA 1+ (A) None    Trichomonas, UA NOT REPORTED None    Amorphous, UA NOT REPORTED None    Other Observations UA NOT REPORTED NOT REQ. Yeast, UA NOT REPORTED None       RADIOLOGY:  Ct Abdomen Pelvis W Iv Contrast Additional Contrast? None    Result Date: 4/4/2019  EXAMINATION: CT OF THE ABDOMEN AND PELVIS WITH CONTRAST 4/4/2019 10:05 pm TECHNIQUE: CT of the abdomen and pelvis was performed with the administration of intravenous contrast. Multiplanar reformatted images are provided for review. Dose modulation, iterative reconstruction, and/or weight based adjustment of the mA/kV was utilized to reduce the radiation dose to as low as reasonably achievable.  COMPARISON: October 2, 2018 CT abdomen and pelvis HISTORY: ORDERING SYSTEM PROVIDED HISTORY: abdominal pain TECHNOLOGIST PROVIDED HISTORY: Abdominal pain Ordering Physician Provided Reason for Exam: abd pain Additional 2247 Severe cystitis noted. Abx already started. Post void residual zero. Will call urology   CT ABDOMEN PELVIS W IV CONTRAST Additional Contrast? None [BJ]   3674 On the phone with urology. Will d/c with bactrim DS for 2 weeks for abx coverage. For pain, use pyridium, ibuprofen, tylenol. Not recommending suppositories. Will give 1-2 days of opioids for pain. [BJ]   Fri Apr 05, 2019   0006 On reassessment, patient is requesting admission 2/2 severe pain with urination. Patient is concerned that he will not be able to handle the pain at home, and he cannot get to a pharmacy tonight. Will admit to observation unit at this time for pain control. [BJ]      ED Course User Index  [BJ] Kira Lyman DO       PROCEDURES:  None    CONSULTS:  IP CONSULT TO UROLOGY    CRITICAL CARE:  Please see attending physician note. FINAL IMPRESSION      1. Acute cystitis with hematuria    2.  Prostate cancer [facility])          DISPOSITION / PLAN     DISPOSITION Admitted 04/05/2019 12:11:34 AM    PATIENT REFERRED TO:  CARMELA Arroyo - CNP  [address]  [address]  [facility] [address]  [phone]            DISCHARGE MEDICATIONS:  New Prescriptions    No medications on file       Kira Lyman DO  Emergency Medicine Resident    (Please note that portions ofthis note were completed with a voice recognition program.  Efforts were made to edit the dictations but occasionally words are mis-transcribed.)        Kira Lyman DO  Resident  04/05/19 4862

## 2019-04-05 NOTE — ED NOTES
Pt to ed c/o pain in his penis. Pt states he has had a sharp pain shooting from his lower back down into his penis for the past 4 days. Pt states he has had bleeding in his urine and there is burning/pain upon urination. Pt states he thinks he has a kidney stone but denies passing any in the past or having any in the past. Pt states he was recently diagnosed with prostate cancer and has an appt tomorrow but was unable to wait because the pain became so great today. Pt is alert and orinetedx4, rr even and unlabored, will continue to monitor.       Matthew Jefferson RN  04/04/19 2039       Matthew Jefferson RN  04/04/19 4938

## 2019-04-05 NOTE — ED PROVIDER NOTES
Dr Sabino Biggs sign out, hematuria, prostate issue, ( post bx ) plan to discuss with urology,       Lynette Mcneil, DO  04/04/19 9330    Pt admitted, vss,        Lynette Mcneil, DO  04/05/19 7433

## 2019-04-05 NOTE — PROGRESS NOTES
Patient cleared for discharge. Patient discharge instructions explained, Rx given and explained to patient. Patient verbalized understanding of all instructions and all patient questions answered to their satisfaction. Patient departs in stable condition.

## 2019-04-05 NOTE — ED PROVIDER NOTES
[facility]  Emergency Department  Faculty Attestation     I performed a history and physical examination of the patient and discussed management with the resident. I reviewed the residents note and agree with the documented findings and plan of care. Any areas of disagreement are noted on the chart. I was personally present for the key portions of any procedures. I have documented in the chart those procedures where I was not present during the key portions. I have reviewed the emergency nurses triage note. I agree with the chief complaint, past medical history, past surgical history, allergies, medications, social and family history as documented unless otherwise noted below. For Physician Assistant/ Nurse Practitioner cases/documentation I have personally evaluated this patient and have completed at least one if not all key elements of the E/M (history, physical exam, and MDM). Additional findings are as noted. Primary Care Physician:  Tereasa Crigler, APRN - CNP    Screenings:  [unfilled]    CHIEF COMPLAINT       Chief Complaint   Patient presents with    Hematuria     last 4 days        RECENT VITALS:   Temp: 97.8 °F (36.6 °C),  Pulse: 93, Resp: 14, BP: (!) 181/92    LABS:  Labs Reviewed   URINE CULTURE   CBC WITH AUTO DIFFERENTIAL   COMPREHENSIVE METABOLIC PANEL W/ REFLEX TO MG FOR LOW K   URINALYSIS WITH MICROSCOPIC       Radiology  CT ABDOMEN PELVIS W IV CONTRAST Additional Contrast? None    (Results Pending)         Attending Physician Additional  Notes    She has known prostate cancer diagnosed with prostate biopsy. He now has gross hematuria, feeling of incomplete voiding, severe burning pain from her rectum to the tip of the penis after voiding. No nausea or vomiting. No flank pain. He is scheduled for workup for metastases prior to his surgery. He is not on anticoagulation. On exam he is in moderate severe distress, hypertensive, tachycardic. Abdomen is benign. No tenderness. No CVA tenderness. Plan is urinalysis, postvoid residual, consider imaging, analgesics versus B and O suppository, we'll discuss with urology. Josephine Constantino.  Doc MD [place], [facility]  Attending Emergency  Physician                Jose Alejandro Talley MD  04/04/19 4091

## 2019-04-05 NOTE — H&P
medical history of Hematuria, Hypertension, and DILAN (obstructive sleep apnea). I have reviewed the past medical history with the patient and it is pertinent to this complaint. SURGICAL HISTORY      has a past surgical history that includes Appendectomy; Cystoscopy (N/A, 2019); lymph node dissection (Right, ); Septoplasty; and Prostate biopsy (N/A, 3/21/2019). I have reviewed and agree with Surgical History entered    CURRENT MEDICATIONS       lisinopril-hydrochlorothiazide (PRINZIDE;ZESTORETIC) 20-12.5 MG per tablet 1 tablet Daily   cetirizine (ZYRTEC) tablet 10 mg Daily   tamsulosin (FLOMAX) capsule 0.4 mg Daily   sodium chloride flush 0.9 % injection 10 mL 2 times per day   sodium chloride flush 0.9 % injection 10 mL PRN   acetaminophen (TYLENOL) tablet 650 mg Q4H PRN   cefTRIAXone (ROCEPHIN) 1 g IVPB in 50 mL D5W minibag Q24H   oxyCODONE (ROXICODONE) immediate release tablet 5 mg Q4H PRN   Or    oxyCODONE (ROXICODONE) immediate release tablet 10 mg Q4H PRN   morphine injection 2 mg Q2H PRN   ondansetron (ZOFRAN-ODT) disintegrating tablet 4 mg Q8H PRN   phenazopyridine (PYRIDIUM) tablet 200 mg TID WC       All medication charted and reviewed. ALLERGIES     has No Known Allergies. FAMILY HISTORY     indicated that his mother is alive. He indicated that his father is . family history includes High Blood Pressure in his mother. The patient denies any pertinent family history. I have reviewed and agree with the family history entered. I have reviewed the Family History and it is not significant to the case    SOCIAL HISTORY      reports that he has never smoked. He has never used smokeless tobacco. He reports that he does not drink alcohol or use drugs. I have reviewed and agree with all Social.  There are no concerns for substance abuse/use. PHYSICAL EXAM     INITIAL VITALS:  height is 5' 8\" (1.727 m) and weight is 242 lb (109.8 kg).  His oral temperature is 98.4 °F (36.9 °C). His blood pressure is 136/84 and his pulse is 76. His respiration is 16 and oxygen saturation is 98%. CONSTITUTIONAL: AOx4, no apparent distress, appears stated age    HEAD: normocephalic, atraumatic   EYES: PERRLA, EOMI    ENT: moist mucous membranes, uvula midline   NECK: supple, symmetric   BACK: symmetric   LUNGS: clear to auscultation bilaterally   CARDIOVASCULAR: regular rate and rhythm, no murmurs, rubs or gallops   ABDOMEN: soft, non-tender, non-distended with normal active bowel sounds   NEUROLOGIC:  MAEx4, no focal sensory or motor deficits   MUSCULOSKELETAL: no clubbing, cyanosis or edema   SKIN: no rash or wounds       DIFFERENTIAL DIAGNOSIS/MDM:     Acute cystitis, prostatitis, STI, nephrolithiasis, pyelonephritis    DIAGNOSTIC RESULTS       RADIOLOGY:   I directly visualized the following  images and reviewed the radiologist interpretations:    Ct Abdomen Pelvis W Iv Contrast Additional Contrast? None    Result Date: 4/4/2019  EXAMINATION: CT OF THE ABDOMEN AND PELVIS WITH CONTRAST 4/4/2019 10:05 pm TECHNIQUE: CT of the abdomen and pelvis was performed with the administration of intravenous contrast. Multiplanar reformatted images are provided for review. Dose modulation, iterative reconstruction, and/or weight based adjustment of the mA/kV was utilized to reduce the radiation dose to as low as reasonably achievable. COMPARISON: October 2, 2018 CT abdomen and pelvis HISTORY: ORDERING SYSTEM PROVIDED HISTORY: abdominal pain TECHNOLOGIST PROVIDED HISTORY: Abdominal pain Ordering Physician Provided Reason for Exam: abd pain Additional signs and symptoms: blood in urine Relevant Medical/Surgical History: hx prostate CA FINDINGS: Lower Chest: Clear Organs: The abdominal wall appears normal. The liver, spleen, pancreas, and adrenals appear normal.  Gallbladder normal. Kidneys appear normal. The bladder is collapsed, thickened, edematous, and inflamed. GI/Bowel:  The stomach,small bowel, and colon appear normal. The appendix is not identified. Pelvis: Prostate unremarkable except for central calcifications. Peritoneum/Retroperitoneum: The abdominal aorta and iliac arteries are normal in caliber. There is no pathologic adenopathy. Bones/Soft Tissues: Normal     Severe cystitis. Recommend urologic consultation. Us Guided Needle Placement    Result Date: 3/21/2019  PROCEDURE: ULTRASOUND GUIDANCE FOR PROSTATE BIOPSY 3/21/2019 HISTORY: ORDERING SYSTEM PROVIDED HISTORY: prostate bx in OR with Dr. Cipriano Shirley TECHNOLOGIST PROVIDED HISTORY: prostate bx in OR with Dr. Cipriano Shirley Elevated PSA FINDINGS: Transrectal scanning of prostate gland was performed. The gland measures 48.5 x 35.5 x 42.9 mm with estimated volume 38.64 mL. There are calcifications in the gland. Ultrasound guidance provided for prostate biopsy performed in surgery by Dr. Cipriano Shirley. Please correlate with procedure report for additional details. Ultrasound guidance for prostate biopsy in surgery. LABS:  I have reviewed and interpreted all available lab results.   Labs Reviewed   COMPREHENSIVE METABOLIC PANEL W/ REFLEX TO MG FOR LOW K - Abnormal; Notable for the following components:       Result Value    Glucose 129 (*)     All other components within normal limits   URINALYSIS WITH MICROSCOPIC - Abnormal; Notable for the following components:    Color, UA DARK YELLOW (*)     Turbidity UA CLOUDY (*)     Specific Gravity, UA 1.034 (*)     Urine Hgb LARGE (*)     Protein, UA 3+ (*)     Nitrite, Urine POSITIVE (*)     Leukocyte Esterase, Urine TRACE (*)     Crystals UA CALCIUM OXALATE (*)     Crystals UA FEW (*)     Bacteria, UA FEW (*)     Mucus, UA 1+ (*)     All other components within normal limits   URINE CULTURE   CBC WITH AUTO DIFFERENTIAL           SCREENING TOOLS:    HEART Risk Score for Chest Pain Patients   History and Physical Exam Suspicion Level  (Nausea, Vomiting, Diaphoresis, Radiation, Exertion)   Slightly Suspicious (0 pts)   Moderately Suspicious (1 pt)   Highly Suspicious (2 pts)   EKG Interpretation   Normal (0 pts)   Non-Specific Repolarization Disturbance (1 pt)   Significant ST-Depression (2 pts)   Age of Patient (in years)   = 39 (0 pts)   46-64 (1 pt)   = 65 (2 pts)   Risk Factors   No Risk Factors (0 pts)   1-2 Risk Factors (1 pt)   = 3 Risk Factors (2 pts)   Risk Factors Include:   Hypercholesterolemia   Hypertension   Diabetes Mellitus   Cigarette smoking   Positive family history   Obesity   CAD   (SLE, CKDz, HIV, Cocaine abuse)   Troponin Levels   = Normal Limit (0 pts)   1-3 Times Normal Limit (1 pt)   > 3 Times Normal Limit (2 pts)  TOTAL:    Percent Risk for Major Adverse Cardiac Event (MACE)  0-3 pts indicates low risk for MACE   2.5% (DISCHARGE)   4-7 pts indicates moderate risk for MACE  20.3% (OBS)  8-10 pts indicates high risk for MACE  72.7% (EARLY INVASIVE TX)    CDU IMPRESSION/PLAN      Catherine Del Toro is a 66 y.o. male who presents with    1. Acute hematuria and dysuria secondary to acute cystitis with hematuria, improved with IV antibiotics and oral Pyridium     · CT scan in the emergency department revealed inflammation of the bladder consistent with acute cystitis  · Patient did have a prostate biopsy 2 weeks ago which was positive for prostate cancer, no obvious evidence of metastases on CT scan    · IP CONSULT TO UROLOGY  - Bladder scan was zero  - CT scan shows no evidence of prostate abscess  - Continue Flomax  - Follow up urine culture  - Ceftriaxone 1 g daily.  Can switch to Bactrim DS BID x 14 days for home if feeling better and no fevers today    · Continue home meds, pain control   · Monitor vitals, labs, imaging         CONSULTS:    IP CONSULT TO UROLOGY    PROCEDURES:  Not indicated       PATIENT REFERRED TO:    CARMELA Gabriel - CNP  [address] [facility] [address]  [address]  [facility] [phone]            --  Mellisa Brantley DO   Emergency Medicine Resident     This dictation was generated by voice recognition computer software. Although all attempts are made to edit the dictation for accuracy, there may be errors in the transcription that are not intended.

## 2019-04-06 LAB
CULTURE: ABNORMAL
CULTURE: ABNORMAL
Lab: ABNORMAL
SPECIMEN DESCRIPTION: ABNORMAL

## 2019-04-07 NOTE — DISCHARGE SUMMARY
CDU Discharge Summary        Patient:  Elizabeth Caicedo  YOB: 1960    MRN: [id number]   Acct: [de-identified]    Primary Care Physician: CARMELA Keyes CNP    Admit date:  4/4/2019  8:21 PM  Discharge date: 4/5/2019  2:40 PM     Discharge Diagnoses:     1. Acute hematuria and dysuria secondary to acute cystitis with hematuria, improved with IV antibiotics and oral Pyridium      · CT scan in the emergency department revealed inflammation of the bladder consistent with acute cystitis  · Patient did have a prostate biopsy 2 weeks ago which was positive for prostate cancer, no obvious evidence of metastases on CT scan     · IP CONSULT TO UROLOGY  - Bladder scan was zero  - CT scan shows no evidence of prostate abscess  - Continue Flomax  - Follow up urine culture  - Ceftriaxone 1 g daily. Can switch to Bactrim DS BID x 14 days for home if feeling better and no fevers today      Follow-up:  Call today/tomorrow for a follow up appointment with CARMELA Keyes CNP , or return to the Emergency Room with worsening symptoms    Stressed to patient the importance of following up with primary care doctor for further workup/management of symptoms. Pt verbalizes understanding and agrees with plan. Discharge Medication Changes:       Medication List      START taking these medications    oxyCODONE-acetaminophen 5-325 MG per tablet  Commonly known as:  PERCOCET  Take 1 tablet by mouth every 6 hours as needed for Pain for up to 3 days. Intended supply: 3 days.  Take lowest dose possible to manage pain     phenazopyridine 200 MG tablet  Commonly known as:  PYRIDIUM  Take 1 tablet by mouth 3 times daily (with meals) for 5 days     sulfamethoxazole-trimethoprim 800-160 MG per tablet  Commonly known as:  BACTRIM DS  Take 1 tablet by mouth 2 times daily for 14 days        CONTINUE taking these medications    cephALEXin 500 MG capsule  Commonly known as:  KEFLEX  Take 1 capsule by mouth 3 times daily Take first dose night prior to prostate biopsy and then three times a day thereafter until completed     ciprofloxacin 500 MG tablet  Commonly known as:  CIPRO  Take 1 tablet by mouth 2 times daily Take first dose night prior to prostate biopsy and then twice a day thereafter until completed     lisinopril-hydrochlorothiazide 20-12.5 MG per tablet  Commonly known as:  PRINZIDE;ZESTORETIC     loratadine 10 MG tablet  Commonly known as:  CLARITIN     meclizine 25 MG tablet  Commonly known as:  ANTIVERT     tamsulosin 0.4 MG capsule  Commonly known as:  FLOMAX           Where to Get Your Medications      You can get these medications from any pharmacy    Bring a paper prescription for each of these medications  · oxyCODONE-acetaminophen 5-325 MG per tablet  · phenazopyridine 200 MG tablet  · sulfamethoxazole-trimethoprim 800-160 MG per tablet         Diet:  No diet orders on file, advance as tolerated     Activity:  As tolerated    Consultants: IP CONSULT TO UROLOGY    Procedures:  Not indicated     Diagnostic Test:   Results for orders placed or performed during the hospital encounter of 04/04/19   Urine Culture   Result Value Ref Range    Specimen Description . CLEAN CATCH URINE     Special Requests NOT REPORTED     Culture ESCHERICHIA COLI  >[id number] CFU/ML   (A)     Culture       GRAM NEGATIVE RODS  10 to 50,000 CFU/ML  Susceptibility testing not performed on low colony count organisms.          Susceptibility    Escherichia coli - BACTERIAL SUSCEPTIBILITY PANEL ODIN     amikacin Value in next row        NOT REPORTED     ampicillin Value in next row Sensitive       <=2SUSCEPTIBLE     ampicillin-sulbactam Value in next row        NOT REPORTED     aztreonam Value in next row Sensitive       <=1SUSCEPTIBLE     ceFAZolin Value in next row Sensitive       <=4SUSCEPTIBLE     ceFAZolin Value in next row Sensitive       <=4SUSCEPTIBLE     cefepime Value in next row        NOT REPORTED     cefTRIAXone Value in next row Sensitive arrange for transportation on their own or work with the  for a ride. Patient counseled NOT to drive while under the influence of narcotics/ pain killers. Condition: Good    Patient stable and ready for discharge home. I have discussed plan of care with patient and they are in understanding. They were instructed to read discharge paperwork. All of their questions and concerns were addressed. Time Spent: 0 day      --  Lele Ji DO  Emergency Medicine Resident Physician    This dictation was generated by voice recognition computer software. Although all attempts are made to edit the dictation for accuracy, there may be errors in the transcription that are not intended.

## 2019-04-08 ENCOUNTER — HOSPITAL ENCOUNTER (OUTPATIENT)
Dept: NUCLEAR MEDICINE | Age: 59
Discharge: HOME | End: 2019-04-10
Payer: MEDICAID

## 2019-04-08 ENCOUNTER — APPOINTMENT (OUTPATIENT)
Dept: CT IMAGING | Age: 59
End: 2019-04-08
Payer: MEDICAID

## 2019-04-08 DIAGNOSIS — C61 PROSTATE CANCER (HCC): ICD-10-CM

## 2019-04-08 PROCEDURE — 78306 BONE IMAGING WHOLE BODY: CPT

## 2019-04-08 PROCEDURE — A9503 TC99M MEDRONATE: HCPCS | Performed by: UROLOGY

## 2019-04-08 PROCEDURE — 3430000000 HC RX DIAGNOSTIC RADIOPHARMACEUTICAL: Performed by: UROLOGY

## 2019-04-08 RX ORDER — TC 99M MEDRONATE 20 MG/10ML
25 INJECTION, POWDER, LYOPHILIZED, FOR SOLUTION INTRAVENOUS
Status: COMPLETED | OUTPATIENT
Start: 2019-04-08 | End: 2019-04-08

## 2019-04-08 RX ADMIN — TC 99M MEDRONATE 25 MILLICURIE: 20 INJECTION, POWDER, LYOPHILIZED, FOR SOLUTION INTRAVENOUS at 07:50

## 2019-04-08 NOTE — PROGRESS NOTES
Reviewed patient's urine culture - culture positive for E.coli. Patient was discharged on Bactrim -160 mg BID for 14 days, and culture is sensitive to prescribed medication. Antibiotic prescribed at discharge is appropriate - no changes made to antibiotic regimen.    Jaleel Lopez, Pharm D Candidate, 4/8/2019 2:55 PM

## 2019-04-12 ENCOUNTER — TELEPHONE (OUTPATIENT)
Dept: UROLOGY | Age: 59
End: 2019-04-12

## 2019-04-24 ENCOUNTER — TELEPHONE (OUTPATIENT)
Dept: UROLOGY | Age: 59
End: 2019-04-24

## 2019-05-17 ENCOUNTER — HOSPITAL ENCOUNTER (OUTPATIENT)
Age: 59
Setting detail: SPECIMEN
Discharge: HOME | End: 2019-05-17
Payer: MEDICAID

## 2019-05-17 LAB
-: ABNORMAL
AMORPHOUS: ABNORMAL
BACTERIA: ABNORMAL
BILIRUBIN URINE: NEGATIVE
CASTS UA: ABNORMAL /LPF (ref 0–8)
COLOR: YELLOW
COMMENT UA: ABNORMAL
CREATININE URINE: 183.6 MG/DL (ref 39–259)
CRYSTALS, UA: ABNORMAL /HPF
EPITHELIAL CELLS UA: ABNORMAL /HPF (ref 0–5)
GLUCOSE URINE: ABNORMAL
KETONES, URINE: NEGATIVE
LEUKOCYTE ESTERASE, URINE: ABNORMAL
MICROALBUMIN/CREAT 24H UR: 14 MG/L
MICROALBUMIN/CREAT UR-RTO: 8 MCG/MG CREAT
MUCUS: ABNORMAL
NITRITE, URINE: NEGATIVE
OTHER OBSERVATIONS UA: ABNORMAL
PH UA: 5.5 (ref 5–8)
PROTEIN UA: NEGATIVE
RBC UA: ABNORMAL /HPF (ref 0–4)
RENAL EPITHELIAL, UA: ABNORMAL /HPF
SPECIFIC GRAVITY UA: 1.02 (ref 1–1.03)
TRICHOMONAS: ABNORMAL
TURBIDITY: ABNORMAL
URINE HGB: NEGATIVE
UROBILINOGEN, URINE: NORMAL
WBC UA: ABNORMAL /HPF (ref 0–5)
YEAST: ABNORMAL

## 2019-05-18 LAB
CULTURE: NO GROWTH
Lab: NORMAL
SPECIMEN DESCRIPTION: NORMAL

## 2019-05-28 RX ORDER — FLUTICASONE PROPIONATE 50 MCG
SPRAY, SUSPENSION (ML) NASAL
COMMUNITY
Start: 2019-04-23 | End: 2019-08-23 | Stop reason: SDUPTHER

## 2019-05-30 ENCOUNTER — ANESTHESIA EVENT (OUTPATIENT)
Dept: OPERATING ROOM | Age: 59
End: 2019-05-30
Payer: MEDICAID

## 2019-05-31 ENCOUNTER — HOSPITAL ENCOUNTER (OUTPATIENT)
Age: 59
Setting detail: OBSERVATION
Discharge: HOME | End: 2019-06-01
Attending: UROLOGY | Admitting: UROLOGY
Payer: MEDICAID

## 2019-05-31 ENCOUNTER — ANESTHESIA (OUTPATIENT)
Dept: OPERATING ROOM | Age: 59
End: 2019-05-31
Payer: MEDICAID

## 2019-05-31 VITALS — DIASTOLIC BLOOD PRESSURE: 45 MMHG | TEMPERATURE: 98.7 F | SYSTOLIC BLOOD PRESSURE: 113 MMHG | OXYGEN SATURATION: 96 %

## 2019-05-31 DIAGNOSIS — C61 PROSTATE CANCER (HCC): Primary | ICD-10-CM

## 2019-05-31 LAB
ABO/RH: NORMAL
ALLEN TEST: ABNORMAL
ANION GAP SERPL CALCULATED.3IONS-SCNC: 11 MMOL/L (ref 9–17)
ANION GAP SERPL CALCULATED.3IONS-SCNC: 16 MMOL/L (ref 9–17)
ANION GAP: 14 MMOL/L (ref 7–16)
ANTIBODY SCREEN: NEGATIVE
ARM BAND NUMBER: NORMAL
BUN BLDV-MCNC: 16 MG/DL (ref 6–20)
BUN BLDV-MCNC: 21 MG/DL (ref 6–20)
BUN/CREAT BLD: ABNORMAL (ref 9–20)
CALCIUM SERPL-MCNC: 8.4 MG/DL (ref 8.6–10.4)
CHLORIDE BLD-SCNC: 100 MMOL/L (ref 98–107)
CHLORIDE BLD-SCNC: 102 MMOL/L (ref 98–107)
CO2: 21 MMOL/L (ref 20–31)
CO2: 26 MMOL/L (ref 20–31)
CREAT SERPL-MCNC: 0.87 MG/DL (ref 0.7–1.2)
CREAT SERPL-MCNC: 1.42 MG/DL (ref 0.7–1.2)
EXPIRATION DATE: NORMAL
FIO2: ABNORMAL
GFR AFRICAN AMERICAN: >60 ML/MIN
GFR AFRICAN AMERICAN: >60 ML/MIN
GFR NON-AFRICAN AMERICAN: 52 ML/MIN
GFR NON-AFRICAN AMERICAN: >60 ML/MIN
GFR NON-AFRICAN AMERICAN: >60 ML/MIN
GFR SERPL CREATININE-BSD FRML MDRD: >60 ML/MIN
GFR SERPL CREATININE-BSD FRML MDRD: ABNORMAL ML/MIN/{1.73_M2}
GFR SERPL CREATININE-BSD FRML MDRD: ABNORMAL ML/MIN/{1.73_M2}
GFR SERPL CREATININE-BSD FRML MDRD: NORMAL ML/MIN/{1.73_M2}
GLUCOSE BLD-MCNC: 193 MG/DL (ref 70–99)
GLUCOSE BLD-MCNC: 87 MG/DL (ref 74–100)
HCO3 VENOUS: 24.3 MMOL/L (ref 22–29)
HCT VFR BLD CALC: 42.5 % (ref 40.7–50.3)
HCT VFR BLD CALC: 43.1 % (ref 40.7–50.3)
HEMOGLOBIN: 13.9 G/DL (ref 13–17)
HEMOGLOBIN: 14.2 G/DL (ref 13–17)
MCH RBC QN AUTO: 28.8 PG (ref 25.2–33.5)
MCHC RBC AUTO-ENTMCNC: 32.7 G/DL (ref 28.4–34.8)
MCV RBC AUTO: 88.2 FL (ref 82.6–102.9)
MODE: ABNORMAL
NEGATIVE BASE EXCESS, VEN: ABNORMAL (ref 0–2)
NRBC AUTOMATED: 0 PER 100 WBC
O2 DEVICE/FLOW/%: ABNORMAL
O2 SAT, VEN: 81 % (ref 60–85)
PATIENT TEMP: ABNORMAL
PCO2, VEN: 37.4 MM HG (ref 41–51)
PDW BLD-RTO: 13 % (ref 11.8–14.4)
PH VENOUS: 7.42 (ref 7.32–7.43)
PLATELET # BLD: 226 K/UL (ref 138–453)
PMV BLD AUTO: 10.9 FL (ref 8.1–13.5)
PO2, VEN: 44 MM HG (ref 30–50)
POC CHLORIDE: 105 MMOL/L (ref 98–107)
POC CREATININE: 0.86 MG/DL (ref 0.51–1.19)
POC HEMATOCRIT: 40 % (ref 41–53)
POC HEMOGLOBIN: 13.6 G/DL (ref 13.5–17.5)
POC IONIZED CALCIUM: 1.17 MMOL/L (ref 1.15–1.33)
POC LACTIC ACID: 0.87 MMOL/L (ref 0.56–1.39)
POC PCO2 TEMP: ABNORMAL MM HG
POC PH TEMP: ABNORMAL
POC PO2 TEMP: ABNORMAL MM HG
POC POTASSIUM: 3.9 MMOL/L (ref 3.5–4.5)
POC SODIUM: 143 MMOL/L (ref 138–146)
POSITIVE BASE EXCESS, VEN: 0 (ref 0–3)
POTASSIUM SERPL-SCNC: 4 MMOL/L (ref 3.7–5.3)
POTASSIUM SERPL-SCNC: 4 MMOL/L (ref 3.7–5.3)
RBC # BLD: 4.82 M/UL (ref 4.21–5.77)
SAMPLE SITE: ABNORMAL
SODIUM BLD-SCNC: 137 MMOL/L (ref 135–144)
SODIUM BLD-SCNC: 139 MMOL/L (ref 135–144)
TOTAL CO2, VENOUS: 25 MMOL/L (ref 23–30)
WBC # BLD: 16.4 K/UL (ref 3.5–11.3)

## 2019-05-31 PROCEDURE — 3600000009 HC SURGERY ROBOT BASE: Performed by: UROLOGY

## 2019-05-31 PROCEDURE — 7100000001 HC PACU RECOVERY - ADDTL 15 MIN: Performed by: UROLOGY

## 2019-05-31 PROCEDURE — 3700000001 HC ADD 15 MINUTES (ANESTHESIA): Performed by: UROLOGY

## 2019-05-31 PROCEDURE — 86850 RBC ANTIBODY SCREEN: CPT

## 2019-05-31 PROCEDURE — 82803 BLOOD GASES ANY COMBINATION: CPT

## 2019-05-31 PROCEDURE — 2709999900 HC NON-CHARGEABLE SUPPLY: Performed by: UROLOGY

## 2019-05-31 PROCEDURE — 85018 HEMOGLOBIN: CPT

## 2019-05-31 PROCEDURE — 6360000002 HC RX W HCPCS: Performed by: NURSE ANESTHETIST, CERTIFIED REGISTERED

## 2019-05-31 PROCEDURE — 83605 ASSAY OF LACTIC ACID: CPT

## 2019-05-31 PROCEDURE — 3700000000 HC ANESTHESIA ATTENDED CARE: Performed by: UROLOGY

## 2019-05-31 PROCEDURE — 86901 BLOOD TYPING SEROLOGIC RH(D): CPT

## 2019-05-31 PROCEDURE — 87086 URINE CULTURE/COLONY COUNT: CPT

## 2019-05-31 PROCEDURE — 80048 BASIC METABOLIC PNL TOTAL CA: CPT

## 2019-05-31 PROCEDURE — 6360000002 HC RX W HCPCS

## 2019-05-31 PROCEDURE — 84295 ASSAY OF SERUM SODIUM: CPT

## 2019-05-31 PROCEDURE — 80051 ELECTROLYTE PANEL: CPT

## 2019-05-31 PROCEDURE — 2580000003 HC RX 258: Performed by: NURSE ANESTHETIST, CERTIFIED REGISTERED

## 2019-05-31 PROCEDURE — S2900 ROBOTIC SURGICAL SYSTEM: HCPCS | Performed by: UROLOGY

## 2019-05-31 PROCEDURE — 2580000003 HC RX 258: Performed by: STUDENT IN AN ORGANIZED HEALTH CARE EDUCATION/TRAINING PROGRAM

## 2019-05-31 PROCEDURE — 2500000003 HC RX 250 WO HCPCS: Performed by: UROLOGY

## 2019-05-31 PROCEDURE — 2580000003 HC RX 258: Performed by: ANESTHESIOLOGY

## 2019-05-31 PROCEDURE — 6370000000 HC RX 637 (ALT 250 FOR IP): Performed by: STUDENT IN AN ORGANIZED HEALTH CARE EDUCATION/TRAINING PROGRAM

## 2019-05-31 PROCEDURE — 2500000003 HC RX 250 WO HCPCS: Performed by: NURSE ANESTHETIST, CERTIFIED REGISTERED

## 2019-05-31 PROCEDURE — 82435 ASSAY OF BLOOD CHLORIDE: CPT

## 2019-05-31 PROCEDURE — 88309 TISSUE EXAM BY PATHOLOGIST: CPT

## 2019-05-31 PROCEDURE — 82565 ASSAY OF CREATININE: CPT

## 2019-05-31 PROCEDURE — 7100000000 HC PACU RECOVERY - FIRST 15 MIN: Performed by: UROLOGY

## 2019-05-31 PROCEDURE — 86900 BLOOD TYPING SEROLOGIC ABO: CPT

## 2019-05-31 PROCEDURE — 84520 ASSAY OF UREA NITROGEN: CPT

## 2019-05-31 PROCEDURE — 85014 HEMATOCRIT: CPT

## 2019-05-31 PROCEDURE — 84132 ASSAY OF SERUM POTASSIUM: CPT

## 2019-05-31 PROCEDURE — 6360000002 HC RX W HCPCS: Performed by: STUDENT IN AN ORGANIZED HEALTH CARE EDUCATION/TRAINING PROGRAM

## 2019-05-31 PROCEDURE — 85027 COMPLETE CBC AUTOMATED: CPT

## 2019-05-31 PROCEDURE — 88307 TISSUE EXAM BY PATHOLOGIST: CPT

## 2019-05-31 PROCEDURE — 6360000002 HC RX W HCPCS: Performed by: ANESTHESIOLOGY

## 2019-05-31 PROCEDURE — 82330 ASSAY OF CALCIUM: CPT

## 2019-05-31 PROCEDURE — 3600000019 HC SURGERY ROBOT ADDTL 15MIN: Performed by: UROLOGY

## 2019-05-31 PROCEDURE — G0378 HOSPITAL OBSERVATION PER HR: HCPCS

## 2019-05-31 PROCEDURE — 82947 ASSAY GLUCOSE BLOOD QUANT: CPT

## 2019-05-31 RX ORDER — SODIUM CHLORIDE 0.9 % (FLUSH) 0.9 %
10 SYRINGE (ML) INJECTION PRN
Status: DISCONTINUED | OUTPATIENT
Start: 2019-05-31 | End: 2019-06-01 | Stop reason: HOSPADM

## 2019-05-31 RX ORDER — FENTANYL CITRATE 50 UG/ML
INJECTION, SOLUTION INTRAMUSCULAR; INTRAVENOUS PRN
Status: DISCONTINUED | OUTPATIENT
Start: 2019-05-31 | End: 2019-05-31 | Stop reason: SDUPTHER

## 2019-05-31 RX ORDER — PHENYLEPHRINE HYDROCHLORIDE 10 MG/ML
INJECTION INTRAVENOUS PRN
Status: DISCONTINUED | OUTPATIENT
Start: 2019-05-31 | End: 2019-05-31 | Stop reason: SDUPTHER

## 2019-05-31 RX ORDER — MECLIZINE HCL 12.5 MG/1
25 TABLET ORAL 3 TIMES DAILY PRN
Status: DISCONTINUED | OUTPATIENT
Start: 2019-05-31 | End: 2019-06-01 | Stop reason: HOSPADM

## 2019-05-31 RX ORDER — ONDANSETRON 2 MG/ML
4 INJECTION INTRAMUSCULAR; INTRAVENOUS EVERY 6 HOURS PRN
Status: DISCONTINUED | OUTPATIENT
Start: 2019-05-31 | End: 2019-06-01 | Stop reason: HOSPADM

## 2019-05-31 RX ORDER — SODIUM CHLORIDE, SODIUM LACTATE, POTASSIUM CHLORIDE, CALCIUM CHLORIDE 600; 310; 30; 20 MG/100ML; MG/100ML; MG/100ML; MG/100ML
INJECTION, SOLUTION INTRAVENOUS CONTINUOUS PRN
Status: DISCONTINUED | OUTPATIENT
Start: 2019-05-31 | End: 2019-05-31 | Stop reason: SDUPTHER

## 2019-05-31 RX ORDER — DIPHENHYDRAMINE HYDROCHLORIDE 50 MG/ML
12.5 INJECTION INTRAMUSCULAR; INTRAVENOUS
Status: DISCONTINUED | OUTPATIENT
Start: 2019-05-31 | End: 2019-05-31 | Stop reason: HOSPADM

## 2019-05-31 RX ORDER — MIDAZOLAM HYDROCHLORIDE 1 MG/ML
1 INJECTION INTRAMUSCULAR; INTRAVENOUS EVERY 5 MIN PRN
Status: DISCONTINUED | OUTPATIENT
Start: 2019-05-31 | End: 2019-06-01 | Stop reason: HOSPADM

## 2019-05-31 RX ORDER — ONDANSETRON 2 MG/ML
4 INJECTION INTRAMUSCULAR; INTRAVENOUS
Status: DISCONTINUED | OUTPATIENT
Start: 2019-05-31 | End: 2019-05-31 | Stop reason: HOSPADM

## 2019-05-31 RX ORDER — SODIUM CHLORIDE 9 MG/ML
INJECTION, SOLUTION INTRAVENOUS CONTINUOUS
Status: DISCONTINUED | OUTPATIENT
Start: 2019-05-31 | End: 2019-06-01

## 2019-05-31 RX ORDER — CETIRIZINE HYDROCHLORIDE 10 MG/1
10 TABLET ORAL DAILY
Status: DISCONTINUED | OUTPATIENT
Start: 2019-05-31 | End: 2019-06-01 | Stop reason: HOSPADM

## 2019-05-31 RX ORDER — ONDANSETRON 2 MG/ML
INJECTION INTRAMUSCULAR; INTRAVENOUS PRN
Status: DISCONTINUED | OUTPATIENT
Start: 2019-05-31 | End: 2019-05-31 | Stop reason: SDUPTHER

## 2019-05-31 RX ORDER — CEFAZOLIN SODIUM 1 G/3ML
INJECTION, POWDER, FOR SOLUTION INTRAMUSCULAR; INTRAVENOUS PRN
Status: DISCONTINUED | OUTPATIENT
Start: 2019-05-31 | End: 2019-05-31 | Stop reason: SDUPTHER

## 2019-05-31 RX ORDER — GLYCOPYRROLATE 1 MG/5 ML
SYRINGE (ML) INTRAVENOUS PRN
Status: DISCONTINUED | OUTPATIENT
Start: 2019-05-31 | End: 2019-05-31 | Stop reason: SDUPTHER

## 2019-05-31 RX ORDER — ACETAMINOPHEN 325 MG/1
650 TABLET ORAL EVERY 6 HOURS
Status: DISCONTINUED | OUTPATIENT
Start: 2019-05-31 | End: 2019-06-01 | Stop reason: HOSPADM

## 2019-05-31 RX ORDER — BALANCED SALT SOLUTION ENRICHED WITH BICARBONATE, DEXTROSE, AND GLUTATHIONE
KIT INTRAOCULAR PRN
Status: DISCONTINUED | OUTPATIENT
Start: 2019-05-31 | End: 2019-05-31 | Stop reason: SDUPTHER

## 2019-05-31 RX ORDER — SODIUM CHLORIDE, SODIUM LACTATE, POTASSIUM CHLORIDE, CALCIUM CHLORIDE 600; 310; 30; 20 MG/100ML; MG/100ML; MG/100ML; MG/100ML
INJECTION, SOLUTION INTRAVENOUS CONTINUOUS
Status: DISCONTINUED | OUTPATIENT
Start: 2019-05-31 | End: 2019-05-31

## 2019-05-31 RX ORDER — CEFAZOLIN SODIUM 1 G/50ML
1 INJECTION, SOLUTION INTRAVENOUS EVERY 8 HOURS
Status: DISCONTINUED | OUTPATIENT
Start: 2019-05-31 | End: 2019-06-01 | Stop reason: HOSPADM

## 2019-05-31 RX ORDER — FENTANYL CITRATE 50 UG/ML
25 INJECTION, SOLUTION INTRAMUSCULAR; INTRAVENOUS EVERY 5 MIN PRN
Status: DISCONTINUED | OUTPATIENT
Start: 2019-05-31 | End: 2019-05-31 | Stop reason: HOSPADM

## 2019-05-31 RX ORDER — DEXAMETHASONE SODIUM PHOSPHATE 10 MG/ML
INJECTION INTRAMUSCULAR; INTRAVENOUS PRN
Status: DISCONTINUED | OUTPATIENT
Start: 2019-05-31 | End: 2019-05-31 | Stop reason: SDUPTHER

## 2019-05-31 RX ORDER — KETOROLAC TROMETHAMINE 15 MG/ML
15 INJECTION, SOLUTION INTRAMUSCULAR; INTRAVENOUS EVERY 6 HOURS
Status: DISCONTINUED | OUTPATIENT
Start: 2019-05-31 | End: 2019-06-01 | Stop reason: HOSPADM

## 2019-05-31 RX ORDER — SODIUM CHLORIDE 0.9 % (FLUSH) 0.9 %
10 SYRINGE (ML) INJECTION EVERY 12 HOURS SCHEDULED
Status: DISCONTINUED | OUTPATIENT
Start: 2019-05-31 | End: 2019-06-01 | Stop reason: HOSPADM

## 2019-05-31 RX ORDER — PROPOFOL 10 MG/ML
INJECTION, EMULSION INTRAVENOUS PRN
Status: DISCONTINUED | OUTPATIENT
Start: 2019-05-31 | End: 2019-05-31 | Stop reason: SDUPTHER

## 2019-05-31 RX ORDER — LIDOCAINE HYDROCHLORIDE 10 MG/ML
1 INJECTION, SOLUTION EPIDURAL; INFILTRATION; INTRACAUDAL; PERINEURAL
Status: DISCONTINUED | OUTPATIENT
Start: 2019-05-31 | End: 2019-05-31 | Stop reason: HOSPADM

## 2019-05-31 RX ORDER — ROCURONIUM BROMIDE 10 MG/ML
INJECTION, SOLUTION INTRAVENOUS PRN
Status: DISCONTINUED | OUTPATIENT
Start: 2019-05-31 | End: 2019-05-31 | Stop reason: SDUPTHER

## 2019-05-31 RX ORDER — LIDOCAINE HYDROCHLORIDE 10 MG/ML
INJECTION, SOLUTION EPIDURAL; INFILTRATION; INTRACAUDAL; PERINEURAL PRN
Status: DISCONTINUED | OUTPATIENT
Start: 2019-05-31 | End: 2019-05-31 | Stop reason: SDUPTHER

## 2019-05-31 RX ORDER — FENTANYL CITRATE 50 UG/ML
50 INJECTION, SOLUTION INTRAMUSCULAR; INTRAVENOUS EVERY 5 MIN PRN
Status: DISCONTINUED | OUTPATIENT
Start: 2019-05-31 | End: 2019-05-31 | Stop reason: HOSPADM

## 2019-05-31 RX ORDER — BUPIVACAINE HYDROCHLORIDE 5 MG/ML
INJECTION, SOLUTION EPIDURAL; INTRACAUDAL PRN
Status: DISCONTINUED | OUTPATIENT
Start: 2019-05-31 | End: 2019-05-31 | Stop reason: ALTCHOICE

## 2019-05-31 RX ORDER — DOCUSATE SODIUM 100 MG/1
100 CAPSULE, LIQUID FILLED ORAL 2 TIMES DAILY
Status: DISCONTINUED | OUTPATIENT
Start: 2019-05-31 | End: 2019-06-01 | Stop reason: HOSPADM

## 2019-05-31 RX ORDER — OXYCODONE HYDROCHLORIDE 5 MG/1
5 TABLET ORAL EVERY 4 HOURS PRN
Status: DISCONTINUED | OUTPATIENT
Start: 2019-05-31 | End: 2019-06-01 | Stop reason: HOSPADM

## 2019-05-31 RX ADMIN — ROCURONIUM BROMIDE 30 MG: 10 INJECTION INTRAVENOUS at 14:15

## 2019-05-31 RX ADMIN — Medication 0.2 MG: at 14:47

## 2019-05-31 RX ADMIN — ROCURONIUM BROMIDE 20 MG: 10 INJECTION INTRAVENOUS at 16:34

## 2019-05-31 RX ADMIN — PHENYLEPHRINE HYDROCHLORIDE 200 MCG: 10 INJECTION INTRAVENOUS at 16:10

## 2019-05-31 RX ADMIN — PHENYLEPHRINE HYDROCHLORIDE 200 MCG: 10 INJECTION INTRAVENOUS at 15:46

## 2019-05-31 RX ADMIN — PHENYLEPHRINE HYDROCHLORIDE 200 MCG: 10 INJECTION INTRAVENOUS at 18:52

## 2019-05-31 RX ADMIN — FENTANYL CITRATE 50 MCG: 50 INJECTION INTRAMUSCULAR; INTRAVENOUS at 17:42

## 2019-05-31 RX ADMIN — PHENYLEPHRINE HYDROCHLORIDE 200 MCG: 10 INJECTION INTRAVENOUS at 16:01

## 2019-05-31 RX ADMIN — PHENYLEPHRINE HYDROCHLORIDE 200 MCG: 10 INJECTION INTRAVENOUS at 15:34

## 2019-05-31 RX ADMIN — FENTANYL CITRATE 50 MCG: 50 INJECTION INTRAMUSCULAR; INTRAVENOUS at 20:09

## 2019-05-31 RX ADMIN — SODIUM CHLORIDE, POTASSIUM CHLORIDE, SODIUM LACTATE AND CALCIUM CHLORIDE: 600; 310; 30; 20 INJECTION, SOLUTION INTRAVENOUS at 18:37

## 2019-05-31 RX ADMIN — HYDROMORPHONE HYDROCHLORIDE 0.25 MG: 1 INJECTION, SOLUTION INTRAMUSCULAR; INTRAVENOUS; SUBCUTANEOUS at 20:38

## 2019-05-31 RX ADMIN — HYDROMORPHONE HYDROCHLORIDE 25 MG: 1 INJECTION, SOLUTION INTRAMUSCULAR; INTRAVENOUS; SUBCUTANEOUS at 20:50

## 2019-05-31 RX ADMIN — Medication 10 ML: at 22:27

## 2019-05-31 RX ADMIN — PHENYLEPHRINE HYDROCHLORIDE 100 MCG: 10 INJECTION INTRAVENOUS at 14:48

## 2019-05-31 RX ADMIN — PHENYLEPHRINE HYDROCHLORIDE 200 MCG: 10 INJECTION INTRAVENOUS at 18:19

## 2019-05-31 RX ADMIN — PHENYLEPHRINE HYDROCHLORIDE 100 MCG: 10 INJECTION INTRAVENOUS at 14:39

## 2019-05-31 RX ADMIN — SODIUM CHLORIDE, POTASSIUM CHLORIDE, SODIUM LACTATE AND CALCIUM CHLORIDE: 600; 310; 30; 20 INJECTION, SOLUTION INTRAVENOUS at 13:24

## 2019-05-31 RX ADMIN — HYDROMORPHONE HYDROCHLORIDE 0.25 MG: 1 INJECTION, SOLUTION INTRAMUSCULAR; INTRAVENOUS; SUBCUTANEOUS at 21:18

## 2019-05-31 RX ADMIN — LIDOCAINE HYDROCHLORIDE 50 MG: 10 INJECTION, SOLUTION EPIDURAL; INFILTRATION; INTRACAUDAL; PERINEURAL at 13:20

## 2019-05-31 RX ADMIN — ROCURONIUM BROMIDE 10 MG: 10 INJECTION INTRAVENOUS at 17:43

## 2019-05-31 RX ADMIN — CEFAZOLIN 2000 MG: 1 INJECTION, POWDER, FOR SOLUTION INTRAMUSCULAR; INTRAVENOUS at 16:52

## 2019-05-31 RX ADMIN — DOCUSATE SODIUM 100 MG: 100 CAPSULE, LIQUID FILLED ORAL at 22:57

## 2019-05-31 RX ADMIN — ONDANSETRON 4 MG: 2 INJECTION, SOLUTION INTRAMUSCULAR; INTRAVENOUS at 19:06

## 2019-05-31 RX ADMIN — OXYCODONE HYDROCHLORIDE 5 MG: 5 TABLET ORAL at 22:23

## 2019-05-31 RX ADMIN — ROCURONIUM BROMIDE 50 MG: 10 INJECTION INTRAVENOUS at 13:20

## 2019-05-31 RX ADMIN — PHENYLEPHRINE HYDROCHLORIDE 200 MCG: 10 INJECTION INTRAVENOUS at 16:37

## 2019-05-31 RX ADMIN — ROCURONIUM BROMIDE 10 MG: 10 INJECTION INTRAVENOUS at 15:16

## 2019-05-31 RX ADMIN — FENTANYL CITRATE 100 MCG: 50 INJECTION INTRAMUSCULAR; INTRAVENOUS at 13:20

## 2019-05-31 RX ADMIN — SODIUM CHLORIDE, POTASSIUM CHLORIDE, SODIUM LACTATE AND CALCIUM CHLORIDE: 600; 310; 30; 20 INJECTION, SOLUTION INTRAVENOUS at 13:14

## 2019-05-31 RX ADMIN — PHENYLEPHRINE HYDROCHLORIDE 200 MCG: 10 INJECTION INTRAVENOUS at 18:37

## 2019-05-31 RX ADMIN — DEXAMETHASONE SODIUM PHOSPHATE 10 MG: 10 INJECTION INTRAMUSCULAR; INTRAVENOUS at 13:48

## 2019-05-31 RX ADMIN — ROCURONIUM BROMIDE 10 MG: 10 INJECTION INTRAVENOUS at 18:21

## 2019-05-31 RX ADMIN — PHENYLEPHRINE HYDROCHLORIDE 200 MCG: 10 INJECTION INTRAVENOUS at 17:42

## 2019-05-31 RX ADMIN — PHENYLEPHRINE HYDROCHLORIDE 200 MCG: 10 INJECTION INTRAVENOUS at 14:52

## 2019-05-31 RX ADMIN — BALANCED SALT SOLUTION 6 DROP: 6.4; .75; .48; .3; 3.9; 1.7 SOLUTION OPHTHALMIC at 18:41

## 2019-05-31 RX ADMIN — PHENYLEPHRINE HYDROCHLORIDE 200 MCG: 10 INJECTION INTRAVENOUS at 15:01

## 2019-05-31 RX ADMIN — PHENYLEPHRINE HYDROCHLORIDE 200 MCG: 10 INJECTION INTRAVENOUS at 15:16

## 2019-05-31 RX ADMIN — Medication 0.4 MG: at 19:28

## 2019-05-31 RX ADMIN — PHENYLEPHRINE HYDROCHLORIDE 200 MCG: 10 INJECTION INTRAVENOUS at 15:25

## 2019-05-31 RX ADMIN — ACETAMINOPHEN 650 MG: 325 TABLET ORAL at 22:55

## 2019-05-31 RX ADMIN — NEOSTIGMINE METHYLSULFATE 3 MG: 1 INJECTION, SOLUTION INTRAMUSCULAR; INTRAVENOUS; SUBCUTANEOUS at 19:28

## 2019-05-31 RX ADMIN — PROPOFOL 200 MG: 10 INJECTION, EMULSION INTRAVENOUS at 13:20

## 2019-05-31 RX ADMIN — ROCURONIUM BROMIDE 10 MG: 10 INJECTION INTRAVENOUS at 16:01

## 2019-05-31 RX ADMIN — Medication 2 G: at 13:52

## 2019-05-31 RX ADMIN — FENTANYL CITRATE 50 MCG: 50 INJECTION INTRAMUSCULAR; INTRAVENOUS at 19:27

## 2019-05-31 RX ADMIN — FENTANYL CITRATE 50 MCG: 50 INJECTION INTRAMUSCULAR; INTRAVENOUS at 19:41

## 2019-05-31 RX ADMIN — Medication 2 G: at 18:30

## 2019-05-31 RX ADMIN — PHENYLEPHRINE HYDROCHLORIDE 200 MCG: 10 INJECTION INTRAVENOUS at 18:05

## 2019-05-31 RX ADMIN — SODIUM CHLORIDE: 9 INJECTION, SOLUTION INTRAVENOUS at 22:59

## 2019-05-31 ASSESSMENT — PULMONARY FUNCTION TESTS
PIF_VALUE: 36
PIF_VALUE: 36
PIF_VALUE: 19
PIF_VALUE: 38
PIF_VALUE: 34
PIF_VALUE: 21
PIF_VALUE: 21
PIF_VALUE: 35
PIF_VALUE: 15
PIF_VALUE: 37
PIF_VALUE: 18
PIF_VALUE: 37
PIF_VALUE: 37
PIF_VALUE: 36
PIF_VALUE: 35
PIF_VALUE: 36
PIF_VALUE: 37
PIF_VALUE: 36
PIF_VALUE: 35
PIF_VALUE: 37
PIF_VALUE: 32
PIF_VALUE: 35
PIF_VALUE: 36
PIF_VALUE: 36
PIF_VALUE: 1
PIF_VALUE: 36
PIF_VALUE: 34
PIF_VALUE: 24
PIF_VALUE: 37
PIF_VALUE: 18
PIF_VALUE: 36
PIF_VALUE: 35
PIF_VALUE: 33
PIF_VALUE: 20
PIF_VALUE: 35
PIF_VALUE: 34
PIF_VALUE: 36
PIF_VALUE: 38
PIF_VALUE: 36
PIF_VALUE: 34
PIF_VALUE: 35
PIF_VALUE: 36
PIF_VALUE: 18
PIF_VALUE: 30
PIF_VALUE: 18
PIF_VALUE: 6
PIF_VALUE: 35
PIF_VALUE: 25
PIF_VALUE: 34
PIF_VALUE: 36
PIF_VALUE: 37
PIF_VALUE: 19
PIF_VALUE: 19
PIF_VALUE: 36
PIF_VALUE: 36
PIF_VALUE: 39
PIF_VALUE: 37
PIF_VALUE: 38
PIF_VALUE: 18
PIF_VALUE: 23
PIF_VALUE: 36
PIF_VALUE: 33
PIF_VALUE: 0
PIF_VALUE: 38
PIF_VALUE: 18
PIF_VALUE: 33
PIF_VALUE: 38
PIF_VALUE: 36
PIF_VALUE: 36
PIF_VALUE: 32
PIF_VALUE: 36
PIF_VALUE: 37
PIF_VALUE: 34
PIF_VALUE: 34
PIF_VALUE: 19
PIF_VALUE: 33
PIF_VALUE: 31
PIF_VALUE: 35
PIF_VALUE: 35
PIF_VALUE: 37
PIF_VALUE: 12
PIF_VALUE: 36
PIF_VALUE: 23
PIF_VALUE: 19
PIF_VALUE: 36
PIF_VALUE: 19
PIF_VALUE: 26
PIF_VALUE: 35
PIF_VALUE: 36
PIF_VALUE: 17
PIF_VALUE: 38
PIF_VALUE: 37
PIF_VALUE: 39
PIF_VALUE: 36
PIF_VALUE: 37
PIF_VALUE: 3
PIF_VALUE: 39
PIF_VALUE: 36
PIF_VALUE: 22
PIF_VALUE: 34
PIF_VALUE: 35
PIF_VALUE: 19
PIF_VALUE: 36
PIF_VALUE: 35
PIF_VALUE: 34
PIF_VALUE: 35
PIF_VALUE: 32
PIF_VALUE: 28
PIF_VALUE: 39
PIF_VALUE: 28
PIF_VALUE: 37
PIF_VALUE: 37
PIF_VALUE: 39
PIF_VALUE: 36
PIF_VALUE: 14
PIF_VALUE: 40
PIF_VALUE: 30
PIF_VALUE: 35
PIF_VALUE: 35
PIF_VALUE: 37
PIF_VALUE: 36
PIF_VALUE: 36
PIF_VALUE: 28
PIF_VALUE: 35
PIF_VALUE: 35
PIF_VALUE: 37
PIF_VALUE: 35
PIF_VALUE: 35
PIF_VALUE: 37
PIF_VALUE: 11
PIF_VALUE: 37
PIF_VALUE: 33
PIF_VALUE: 35
PIF_VALUE: 37
PIF_VALUE: 38
PIF_VALUE: 35
PIF_VALUE: 10
PIF_VALUE: 35
PIF_VALUE: 34
PIF_VALUE: 37
PIF_VALUE: 28
PIF_VALUE: 37
PIF_VALUE: 37
PIF_VALUE: 32
PIF_VALUE: 37
PIF_VALUE: 35
PIF_VALUE: 37
PIF_VALUE: 36
PIF_VALUE: 34
PIF_VALUE: 22
PIF_VALUE: 36
PIF_VALUE: 35
PIF_VALUE: 35
PIF_VALUE: 17
PIF_VALUE: 36
PIF_VALUE: 37
PIF_VALUE: 36
PIF_VALUE: 39
PIF_VALUE: 18
PIF_VALUE: 35
PIF_VALUE: 37
PIF_VALUE: 35
PIF_VALUE: 36
PIF_VALUE: 36
PIF_VALUE: 33
PIF_VALUE: 37
PIF_VALUE: 34
PIF_VALUE: 34
PIF_VALUE: 36
PIF_VALUE: 33
PIF_VALUE: 43
PIF_VALUE: 35
PIF_VALUE: 35
PIF_VALUE: 0
PIF_VALUE: 34
PIF_VALUE: 37
PIF_VALUE: 34
PIF_VALUE: 34
PIF_VALUE: 33
PIF_VALUE: 31
PIF_VALUE: 36
PIF_VALUE: 37
PIF_VALUE: 37
PIF_VALUE: 36
PIF_VALUE: 34
PIF_VALUE: 35
PIF_VALUE: 34
PIF_VALUE: 37
PIF_VALUE: 36
PIF_VALUE: 36
PIF_VALUE: 34
PIF_VALUE: 37
PIF_VALUE: 34
PIF_VALUE: 37
PIF_VALUE: 36
PIF_VALUE: 32
PIF_VALUE: 37
PIF_VALUE: 35
PIF_VALUE: 36
PIF_VALUE: 36
PIF_VALUE: 32
PIF_VALUE: 35
PIF_VALUE: 18
PIF_VALUE: 36
PIF_VALUE: 36
PIF_VALUE: 33
PIF_VALUE: 35
PIF_VALUE: 37
PIF_VALUE: 36
PIF_VALUE: 38
PIF_VALUE: 3
PIF_VALUE: 36
PIF_VALUE: 35
PIF_VALUE: 25
PIF_VALUE: 36
PIF_VALUE: 34
PIF_VALUE: 36
PIF_VALUE: 37
PIF_VALUE: 34
PIF_VALUE: 36
PIF_VALUE: 33
PIF_VALUE: 35
PIF_VALUE: 36
PIF_VALUE: 36
PIF_VALUE: 35
PIF_VALUE: 18
PIF_VALUE: 35
PIF_VALUE: 37
PIF_VALUE: 35
PIF_VALUE: 34
PIF_VALUE: 1
PIF_VALUE: 24
PIF_VALUE: 37
PIF_VALUE: 36
PIF_VALUE: 36
PIF_VALUE: 35
PIF_VALUE: 35
PIF_VALUE: 36
PIF_VALUE: 34
PIF_VALUE: 36
PIF_VALUE: 23
PIF_VALUE: 15
PIF_VALUE: 36
PIF_VALUE: 21
PIF_VALUE: 35
PIF_VALUE: 36
PIF_VALUE: 35
PIF_VALUE: 34
PIF_VALUE: 34
PIF_VALUE: 33
PIF_VALUE: 23
PIF_VALUE: 36
PIF_VALUE: 36
PIF_VALUE: 37
PIF_VALUE: 37
PIF_VALUE: 36
PIF_VALUE: 33
PIF_VALUE: 34
PIF_VALUE: 1
PIF_VALUE: 35
PIF_VALUE: 38
PIF_VALUE: 37
PIF_VALUE: 36
PIF_VALUE: 38
PIF_VALUE: 22
PIF_VALUE: 18
PIF_VALUE: 28
PIF_VALUE: 36
PIF_VALUE: 0
PIF_VALUE: 35
PIF_VALUE: 4
PIF_VALUE: 33
PIF_VALUE: 35
PIF_VALUE: 35
PIF_VALUE: 32
PIF_VALUE: 36
PIF_VALUE: 27
PIF_VALUE: 36
PIF_VALUE: 37
PIF_VALUE: 36
PIF_VALUE: 20
PIF_VALUE: 20
PIF_VALUE: 19
PIF_VALUE: 37
PIF_VALUE: 34
PIF_VALUE: 33
PIF_VALUE: 1
PIF_VALUE: 30
PIF_VALUE: 18
PIF_VALUE: 1
PIF_VALUE: 31
PIF_VALUE: 37
PIF_VALUE: 28
PIF_VALUE: 34
PIF_VALUE: 35
PIF_VALUE: 34
PIF_VALUE: 37
PIF_VALUE: 36
PIF_VALUE: 37
PIF_VALUE: 37
PIF_VALUE: 19
PIF_VALUE: 30
PIF_VALUE: 35
PIF_VALUE: 36
PIF_VALUE: 21
PIF_VALUE: 35
PIF_VALUE: 36
PIF_VALUE: 36
PIF_VALUE: 30
PIF_VALUE: 0
PIF_VALUE: 34
PIF_VALUE: 36
PIF_VALUE: 39
PIF_VALUE: 37
PIF_VALUE: 34
PIF_VALUE: 36
PIF_VALUE: 25
PIF_VALUE: 35
PIF_VALUE: 36
PIF_VALUE: 36
PIF_VALUE: 34
PIF_VALUE: 36
PIF_VALUE: 37
PIF_VALUE: 36
PIF_VALUE: 36
PIF_VALUE: 35
PIF_VALUE: 15
PIF_VALUE: 35
PIF_VALUE: 34
PIF_VALUE: 36
PIF_VALUE: 32
PIF_VALUE: 28
PIF_VALUE: 35
PIF_VALUE: 34
PIF_VALUE: 35
PIF_VALUE: 36
PIF_VALUE: 37
PIF_VALUE: 18
PIF_VALUE: 38
PIF_VALUE: 21
PIF_VALUE: 14
PIF_VALUE: 38
PIF_VALUE: 0
PIF_VALUE: 1
PIF_VALUE: 34
PIF_VALUE: 19
PIF_VALUE: 34
PIF_VALUE: 34
PIF_VALUE: 35
PIF_VALUE: 13
PIF_VALUE: 19
PIF_VALUE: 21
PIF_VALUE: 34
PIF_VALUE: 36
PIF_VALUE: 18
PIF_VALUE: 13
PIF_VALUE: 35
PIF_VALUE: 35
PIF_VALUE: 33
PIF_VALUE: 34
PIF_VALUE: 34
PIF_VALUE: 38
PIF_VALUE: 37
PIF_VALUE: 37
PIF_VALUE: 36
PIF_VALUE: 34
PIF_VALUE: 35
PIF_VALUE: 34
PIF_VALUE: 36
PIF_VALUE: 36
PIF_VALUE: 35
PIF_VALUE: 17
PIF_VALUE: 37
PIF_VALUE: 26
PIF_VALUE: 35
PIF_VALUE: 30
PIF_VALUE: 26
PIF_VALUE: 35
PIF_VALUE: 37
PIF_VALUE: 32
PIF_VALUE: 19
PIF_VALUE: 19
PIF_VALUE: 16
PIF_VALUE: 38
PIF_VALUE: 37
PIF_VALUE: 37
PIF_VALUE: 38
PIF_VALUE: 16
PIF_VALUE: 18
PIF_VALUE: 34
PIF_VALUE: 28
PIF_VALUE: 35
PIF_VALUE: 18
PIF_VALUE: 35
PIF_VALUE: 36
PIF_VALUE: 18
PIF_VALUE: 36
PIF_VALUE: 33
PIF_VALUE: 20
PIF_VALUE: 34
PIF_VALUE: 19

## 2019-05-31 ASSESSMENT — ENCOUNTER SYMPTOMS
STRIDOR: 0
SHORTNESS OF BREATH: 0

## 2019-05-31 ASSESSMENT — PAIN - FUNCTIONAL ASSESSMENT: PAIN_FUNCTIONAL_ASSESSMENT: 0-10

## 2019-05-31 ASSESSMENT — PAIN DESCRIPTION - LOCATION
LOCATION: ABDOMEN

## 2019-05-31 ASSESSMENT — PAIN DESCRIPTION - DESCRIPTORS: DESCRIPTORS: CONSTANT

## 2019-05-31 ASSESSMENT — PAIN SCALES - GENERAL
PAINLEVEL_OUTOF10: 10
PAINLEVEL_OUTOF10: 4
PAINLEVEL_OUTOF10: 10
PAINLEVEL_OUTOF10: 10

## 2019-05-31 ASSESSMENT — PAIN DESCRIPTION - PAIN TYPE: TYPE: SURGICAL PAIN

## 2019-05-31 ASSESSMENT — PAIN DESCRIPTION - ORIENTATION: ORIENTATION: ANTERIOR

## 2019-05-31 ASSESSMENT — PAIN DESCRIPTION - FREQUENCY: FREQUENCY: CONTINUOUS

## 2019-05-31 NOTE — H&P
History and Physical    Patient:  Juan Velez  MRN: [id number]  YOB: 1960    CHIEF COMPLAINT:  Prostate Cancer    HISTORY OF PRESENT ILLNESS:   The patient is a 66 y.o. male who presents with Jaspreet 4+4 = 8 Prostate Cancer, elevated PSA 8.24. He had a prostate biopsy with Dr. Jeremy Martínez on 3/21/19 that demonstrated Jaspreet 8 prostate cancer. Had bone scan on 4/8/19 no evidence of osseous metastatic disease. Past Medical History:    Past Medical History:   Diagnosis Date    Hematuria     Hypertension     DILAN (obstructive sleep apnea)     DOES NOT USE MACHINE       Past Surgical History:    Past Surgical History:   Procedure Laterality Date    APPENDECTOMY      CYSTOSCOPY N/A 2/7/2019    CYSTOSCOPY performed by Jeff Jones MD at [address] [facility],[address] Right 2004    NECK    PELVIC LAPAROSCOPY  05/2019    Prostatectomy- pelvic lymph node dissection    PROSTATE BIOPSY N/A 3/21/2019    PROSTATE BIOPSY, ULTRASOUND  (OFFICE TO CONTACT [place] performed by Jeff Jones MD at [facility] SEPTOPLASTY         Medications Prior to Admission:    Prior to Admission medications    Medication Sig Start Date End Date Taking? Authorizing Provider   meclizine (ANTIVERT) 25 MG tablet Take 25 mg by mouth 3 times daily as needed for Dizziness   Yes Historical Provider, MD   loratadine (CLARITIN) 10 MG tablet Take 10 mg by mouth daily   Yes Historical Provider, MD   lisinopril-hydrochlorothiazide (PRINZIDE;ZESTORETIC) 20-12.5 MG per tablet TK 1 T PO DAILY 1/14/18  Yes Historical Provider, MD   fluticasone [facility]) 50 MCG/ACT nasal spray  4/23/19   Historical Provider, MD       Allergies:  Patient has no known allergies.     Social History:    Social History     Socioeconomic History    Marital status: Single     Spouse name: Not on file    Number of children: Not on file    Years of education: Not on file    Highest education level: Not on file   Occupational History    Not on file   Social Needs    Financial resource strain: Not on file    Food insecurity:     Worry: Not on file     Inability: Not on file    Transportation needs:     Medical: Not on file     Non-medical: Not on file   Tobacco Use    Smoking status: Never Smoker    Smokeless tobacco: Never Used   Substance and Sexual Activity    Alcohol use: No    Drug use: No    Sexual activity: Yes     Partners: Female     Birth control/protection: Condom   Lifestyle    Physical activity:     Days per week: Not on file     Minutes per session: Not on file    Stress: Not on file   Relationships    Social connections:     Talks on phone: Not on file     Gets together: Not on file     Attends Zoroastrian service: Not on file     Active member of club or organization: Not on file     Attends meetings of clubs or organizations: Not on file     Relationship status: Not on file    Intimate partner violence:     Fear of current or ex partner: Not on file     Emotionally abused: Not on file     Physically abused: Not on file     Forced sexual activity: Not on file   Other Topics Concern    Not on file   Social History Narrative    Not on file       Family History:    Family History   Problem Relation Age of Onset    High Blood Pressure Mother     No Known Problems Father     Lupus Sister     No Known Problems Brother     No Known Problems Maternal Grandmother     No Known Problems Maternal Grandfather     No Known Problems Paternal Grandmother     No Known Problems Paternal Grandfather     No Known Problems Sister     No Known Problems Sister     No Known Problems Sister     No Known Problems Brother     No Known Problems Brother     No Known Problems Brother        REVIEW OF SYSTEMS:  Constitutional: negative  Eyes: negative  Respiratory: negative  Cardiovascular: negative  Gastrointestinal: negative  Genitourinary: see HPI  Musculoskeletal: negative  Skin: negative   Neurological: negative  Hematological/Lymphatic: negative  Psychological: negative      Physical Exam:      No data found. Constitutional: Patient in no acute distress; Neuro: alert and oriented to person place and time. Psych: Mood and affect normal.  Lungs: Respiratory effort normal  Cardiovascular:  Normal peripheral pulses. Regular rate. Abdomen: Soft, non-tender, non-distended        LABS:   No results for input(s): WBC, HGB, HCT, MCV, PLT in the last 72 hours. No results for input(s): NA, K, CL, CO2, PHOS, BUN, CREATININE in the last 72 hours. Invalid input(s): CA  Lab Results   Component Value Date    PSA 8.24 (H) 01/18/2019    PSA 2.49 01/02/2015       Additional Lab/culture results:    Urinalysis: No results for input(s): COLORU, PHUR, LABCAST, WBCUA, RBCUA, MUCUS, TRICHOMONAS, YEAST, BACTERIA, CLARITYU, SPECGRAV, LEUKOCYTESUR, UROBILINOGEN, Alisia  in the last 72 hours. Invalid input(s): NITRATE, GLUCOSEUKETONESUAMORPHOUS     -----------------------------------------------------------------  Imaging Results:    Assessment and Plan   Impression:    66 y.o. male with Jaspreet 4+4 = 8 Prostate Cancer, elevated PSA 8.24. Plan:   OR today for Robotic Assisted Laparoscopic Prostatectomy with bilateral lymph node dissection.     Idania Markham  11:30 AM 5/31/2019

## 2019-05-31 NOTE — PROGRESS NOTES
Patient was positioned with pink pad that was secured to the bed, arms tucked, and legs secure with a safety strap. Pt was placed in Trendelenburg position and began to slide off of OR table at about 1400.  Kristi, and nurse Maia Bowman came in and helped secure the patient back on the bed with silk tape. Around [phone], patient began to slide again. Due to patient shifting during the procedure, lap belt was readjusted without foam and ankle restraints were applied and tied to OR table at 1807 per verbal order from Dr. Juan Marrero. OR Nurse Krishnan helped assist with securing the patient on the bed. Instructions were given to check patient's legs and ankles and remove restraints at the end of the case.

## 2019-05-31 NOTE — ANESTHESIA PRE PROCEDURE
Department of Anesthesiology  Preprocedure Note       Name:  Arie Height   Age:  66 y.o.  :  1960                                          MRN:  [id number]         Date:  2019      Surgeon: Padmini Faustin):  Radha Grimaldo MD    Procedure: XI LAPAROSCOPIC ROBOTIC RADICAL PROSTATECTOMY, PELVIC LYMPH NODE DISSECTION (N/A )    Medications prior to admission:   Prior to Admission medications    Medication Sig Start Date End Date Taking?  Authorizing Provider   fluticasone (FLONASE) 50 MCG/ACT nasal spray  19  Yes Historical Provider, MD   meclizine (ANTIVERT) 25 MG tablet Take 25 mg by mouth 3 times daily as needed for Dizziness   Yes Historical Provider, MD   loratadine (CLARITIN) 10 MG tablet Take 10 mg by mouth daily   Yes Historical Provider, MD   lisinopril-hydrochlorothiazide (PRINZIDE;ZESTORETIC) 20-12.5 MG per tablet TK 1 T PO DAILY 18  Yes Historical Provider, MD       Current medications:    Current Facility-Administered Medications   Medication Dose Route Frequency Provider Last Rate Last Dose    lactated ringers infusion   Intravenous Continuous Judi Mejia MD        lidocaine PF 1 % injection 1 mL  1 mL Intradermal Once PRN Lisbeth Vaz MD        ceFAZolin (ANCEF) 2 g in dextrose 5 % 50 mL IVPB  2 g Intravenous On Call to Tanner Vidales MD        midazolam (VERSED) injection 1 mg  1 mg Intravenous Q5 Min PRN Lisbeth Vaz MD           Allergies:  No Known Allergies    Problem List:    Patient Active Problem List   Diagnosis Code    Plantar wart B07.0    Dysuria R30.0       Past Medical History:        Diagnosis Date    Hematuria     Hypertension     DILAN (obstructive sleep apnea)     DOES NOT USE MACHINE       Past Surgical History:        Procedure Laterality Date    APPENDECTOMY      CYSTOSCOPY N/A 2019    CYSTOSCOPY performed by Radha Grimaldo MD at [address] [facility],[address] Right 2004    NECK    PELVIC LAPAROSCOPY  2019    Prostatectomy- pelvic lymph node dissection    PROSTATE BIOPSY N/A 3/21/2019    PROSTATE BIOPSY, ULTRASOUND  (OFFICE TO CONTACT [place] performed by Srinivas Diggs MD at [address] [facility]         Social History:    Social History     Tobacco Use    Smoking status: Never Smoker    Smokeless tobacco: Never Used   Substance Use Topics    Alcohol use: No                                Counseling given: Not Answered      Vital Signs (Current):   Vitals:    05/28/19 1454 05/31/19 1139   BP:  134/85   Pulse:  74   Resp:  18   Temp:  98.1 °F (36.7 °C)   TempSrc:  Tympanic   SpO2:  97%   Weight: 240 lb (108.9 kg) 240 lb (108.9 kg)   Height: 5' 8\" (1.727 m) 5' 8\" (1.727 m)                                              BP Readings from Last 3 Encounters:   05/31/19 134/85   04/05/19 131/65   03/29/19 (!) 144/81       NPO Status: Time of last liquid consumption: 2230                        Time of last solid consumption: 2230                        Date of last liquid consumption: 05/30/19                        Date of last solid food consumption: 05/30/19    BMI:   Wt Readings from Last 3 Encounters:   05/31/19 240 lb (108.9 kg)   04/05/19 242 lb (109.8 kg)   03/29/19 242 lb (109.8 kg)     Body mass index is 36.49 kg/m². CBC:   Lab Results   Component Value Date    WBC 9.6 04/04/2019    RBC 5.24 04/04/2019    HGB 14.7 04/04/2019    HCT 45.6 04/04/2019    MCV 87.0 04/04/2019    RDW 13.0 04/04/2019     04/04/2019       CMP:   Lab Results   Component Value Date     04/04/2019    K 4.0 04/04/2019     04/04/2019    CO2 22 04/04/2019    BUN 13 04/04/2019    CREATININE 0.85 04/04/2019    GFRAA >60 04/04/2019    LABGLOM >60 04/04/2019    GLUCOSE 129 04/04/2019    PROT 8.0 04/04/2019    CALCIUM 9.2 04/04/2019    BILITOT 0.50 04/04/2019    ALKPHOS 80 04/04/2019    AST 19 04/04/2019    ALT 30 04/04/2019       POC Tests: No results for input(s): POCGLU, POCNA, POCK, POCCL, POCBUN, POCHEMO, POCHCT in the last 72 hours.     Coags: No results found for: PROTIME, INR, APTT    HCG (If Applicable): No results found for: PREGTESTUR, PREGSERUM, HCG, HCGQUANT     ABGs: No results found for: PHART, PO2ART, HXJ1RKW, YRE5RXV, BEART, K8RSUOBZ     Type & Screen (If Applicable):  No results found for: LABABO, LABRH    Anesthesia Evaluation   no history of anesthetic complications:   Airway: Mallampati: II     Neck ROM: full  Mouth opening: > = 3 FB Dental:          Pulmonary:   (+) sleep apnea:      (-) COPD, asthma, shortness of breath and stridor                           Cardiovascular:    (+) hypertension:,     (-) pacemaker, past MI, CABG/stent,  angina and  CHF        Rate: normal                    Neuro/Psych:      (-) seizures, TIA and CVA           GI/Hepatic/Renal:        (-) GERD, liver disease and no renal disease       Endo/Other:    (+) malignancy/cancer. (-) diabetes mellitus, hypothyroidism, hyperthyroidism, blood dyscrasia               Abdominal:       Abdomen: soft. Vascular:                                        Anesthesia Plan      general     ASA 3       Induction: intravenous. Anesthetic plan and risks discussed with patient.                     Tip Sawant MD   5/31/2019

## 2019-06-01 VITALS
RESPIRATION RATE: 18 BRPM | BODY MASS INDEX: 36.37 KG/M2 | OXYGEN SATURATION: 97 % | WEIGHT: 240 LBS | DIASTOLIC BLOOD PRESSURE: 81 MMHG | SYSTOLIC BLOOD PRESSURE: 140 MMHG | HEART RATE: 79 BPM | HEIGHT: 68 IN | TEMPERATURE: 98.4 F

## 2019-06-01 LAB
ANION GAP SERPL CALCULATED.3IONS-SCNC: 12 MMOL/L (ref 9–17)
BUN BLDV-MCNC: 19 MG/DL (ref 6–20)
BUN/CREAT BLD: ABNORMAL (ref 9–20)
CALCIUM SERPL-MCNC: 8.1 MG/DL (ref 8.6–10.4)
CHLORIDE BLD-SCNC: 100 MMOL/L (ref 98–107)
CO2: 24 MMOL/L (ref 20–31)
CREAT SERPL-MCNC: 0.93 MG/DL (ref 0.7–1.2)
CREATININE FLUID: 1.1 MG/DL
CREATININE FLUID: 1.5 MG/DL
CULTURE: NO GROWTH
GFR AFRICAN AMERICAN: >60 ML/MIN
GFR NON-AFRICAN AMERICAN: >60 ML/MIN
GFR SERPL CREATININE-BSD FRML MDRD: ABNORMAL ML/MIN/{1.73_M2}
GFR SERPL CREATININE-BSD FRML MDRD: ABNORMAL ML/MIN/{1.73_M2}
GLUCOSE BLD-MCNC: 126 MG/DL (ref 70–99)
HCT VFR BLD CALC: 42.1 % (ref 40.7–50.3)
HEMOGLOBIN: 13.4 G/DL (ref 13–17)
Lab: NORMAL
MCH RBC QN AUTO: 27.8 PG (ref 25.2–33.5)
MCHC RBC AUTO-ENTMCNC: 31.8 G/DL (ref 28.4–34.8)
MCV RBC AUTO: 87.3 FL (ref 82.6–102.9)
NRBC AUTOMATED: 0 PER 100 WBC
PDW BLD-RTO: 13.1 % (ref 11.8–14.4)
PLATELET # BLD: 254 K/UL (ref 138–453)
PMV BLD AUTO: 11 FL (ref 8.1–13.5)
POTASSIUM SERPL-SCNC: 3.9 MMOL/L (ref 3.7–5.3)
RBC # BLD: 4.82 M/UL (ref 4.21–5.77)
SODIUM BLD-SCNC: 136 MMOL/L (ref 135–144)
SPECIMEN DESCRIPTION: NORMAL
SPECIMEN TYPE: NORMAL
SPECIMEN TYPE: NORMAL
WBC # BLD: 15.4 K/UL (ref 3.5–11.3)

## 2019-06-01 PROCEDURE — 82570 ASSAY OF URINE CREATININE: CPT

## 2019-06-01 PROCEDURE — 2580000003 HC RX 258: Performed by: STUDENT IN AN ORGANIZED HEALTH CARE EDUCATION/TRAINING PROGRAM

## 2019-06-01 PROCEDURE — 85027 COMPLETE CBC AUTOMATED: CPT

## 2019-06-01 PROCEDURE — 96375 TX/PRO/DX INJ NEW DRUG ADDON: CPT

## 2019-06-01 PROCEDURE — 96374 THER/PROPH/DIAG INJ IV PUSH: CPT

## 2019-06-01 PROCEDURE — 6370000000 HC RX 637 (ALT 250 FOR IP): Performed by: STUDENT IN AN ORGANIZED HEALTH CARE EDUCATION/TRAINING PROGRAM

## 2019-06-01 PROCEDURE — 96376 TX/PRO/DX INJ SAME DRUG ADON: CPT

## 2019-06-01 PROCEDURE — G0378 HOSPITAL OBSERVATION PER HR: HCPCS

## 2019-06-01 PROCEDURE — 80048 BASIC METABOLIC PNL TOTAL CA: CPT

## 2019-06-01 PROCEDURE — 36415 COLL VENOUS BLD VENIPUNCTURE: CPT

## 2019-06-01 PROCEDURE — 6360000002 HC RX W HCPCS: Performed by: STUDENT IN AN ORGANIZED HEALTH CARE EDUCATION/TRAINING PROGRAM

## 2019-06-01 RX ORDER — PSEUDOEPHEDRINE HCL 30 MG
100 TABLET ORAL 2 TIMES DAILY
Qty: 30 CAPSULE | Refills: 0 | Status: SHIPPED | OUTPATIENT
Start: 2019-06-01 | End: 2019-09-18 | Stop reason: ALTCHOICE

## 2019-06-01 RX ORDER — CIPROFLOXACIN 500 MG/1
500 TABLET, FILM COATED ORAL 2 TIMES DAILY
Qty: 6 TABLET | Refills: 0 | Status: SHIPPED | OUTPATIENT
Start: 2019-06-01 | End: 2019-06-04

## 2019-06-01 RX ORDER — OXYCODONE HYDROCHLORIDE 5 MG/1
5 TABLET ORAL EVERY 6 HOURS PRN
Qty: 15 TABLET | Refills: 0 | Status: SHIPPED | OUTPATIENT
Start: 2019-06-01 | End: 2019-06-06

## 2019-06-01 RX ADMIN — OXYCODONE HYDROCHLORIDE 5 MG: 5 TABLET ORAL at 03:34

## 2019-06-01 RX ADMIN — KETOROLAC TROMETHAMINE 15 MG: 15 INJECTION, SOLUTION INTRAMUSCULAR; INTRAVENOUS at 14:34

## 2019-06-01 RX ADMIN — CEFAZOLIN SODIUM 1 G: 1 INJECTION, SOLUTION INTRAVENOUS at 00:22

## 2019-06-01 RX ADMIN — CEFAZOLIN SODIUM 1 G: 1 INJECTION, SOLUTION INTRAVENOUS at 08:30

## 2019-06-01 RX ADMIN — ACETAMINOPHEN 650 MG: 325 TABLET ORAL at 06:22

## 2019-06-01 RX ADMIN — ACETAMINOPHEN 650 MG: 325 TABLET ORAL at 16:59

## 2019-06-01 RX ADMIN — DOCUSATE SODIUM 100 MG: 100 CAPSULE, LIQUID FILLED ORAL at 08:30

## 2019-06-01 RX ADMIN — SODIUM CHLORIDE: 9 INJECTION, SOLUTION INTRAVENOUS at 06:23

## 2019-06-01 RX ADMIN — KETOROLAC TROMETHAMINE 15 MG: 15 INJECTION, SOLUTION INTRAMUSCULAR; INTRAVENOUS at 06:23

## 2019-06-01 RX ADMIN — OXYCODONE HYDROCHLORIDE 5 MG: 5 TABLET ORAL at 16:59

## 2019-06-01 RX ADMIN — ACETAMINOPHEN 650 MG: 325 TABLET ORAL at 10:35

## 2019-06-01 RX ADMIN — CETIRIZINE HYDROCHLORIDE 10 MG: 10 TABLET ORAL at 08:30

## 2019-06-01 RX ADMIN — OXYCODONE HYDROCHLORIDE 5 MG: 5 TABLET ORAL at 10:31

## 2019-06-01 RX ADMIN — KETOROLAC TROMETHAMINE 15 MG: 15 INJECTION, SOLUTION INTRAMUSCULAR; INTRAVENOUS at 00:22

## 2019-06-01 ASSESSMENT — PAIN SCALES - GENERAL
PAINLEVEL_OUTOF10: 4
PAINLEVEL_OUTOF10: 8
PAINLEVEL_OUTOF10: 5
PAINLEVEL_OUTOF10: 6
PAINLEVEL_OUTOF10: 6
PAINLEVEL_OUTOF10: 8

## 2019-06-01 ASSESSMENT — PAIN DESCRIPTION - ORIENTATION
ORIENTATION: LEFT

## 2019-06-01 ASSESSMENT — PAIN DESCRIPTION - DESCRIPTORS: DESCRIPTORS: CONSTANT

## 2019-06-01 ASSESSMENT — PAIN DESCRIPTION - LOCATION
LOCATION: SCROTUM

## 2019-06-01 ASSESSMENT — PAIN DESCRIPTION - FREQUENCY: FREQUENCY: CONTINUOUS

## 2019-06-01 ASSESSMENT — PAIN DESCRIPTION - PAIN TYPE: TYPE: ACUTE PAIN

## 2019-06-01 NOTE — PROGRESS NOTES
Mandi Kruse [facility]  Urology Progress Note     Subjective: No acute events. Denies fevers, chills, chest pain, shortness of breath, nausea or vomiting. Tolerating diet. Pain controlled. -Flatus -BM. Has not yet ambulated.  No documented fevers    JESSICA 90 mL since OR    Patient Vitals for the past 24 hrs:   BP Temp Temp src Pulse Resp SpO2 Height Weight   06/01/19 0801 115/68 98.4 °F (36.9 °C) Oral 81 16 96 % -- --   06/01/19 0645 126/63 98.8 °F (37.1 °C) Oral 74 18 -- -- --   06/01/19 0340 -- -- -- 96 17 97 % -- --   05/31/19 2325 (!) 122/48 99.1 °F (37.3 °C) Oral 90 18 -- -- --   05/31/19 2212 128/76 98.9 °F (37.2 °C) Oral 97 17 98 % 5' 8\" (1.727 m) --   05/31/19 2115 (!) 116/54 -- -- 94 20 96 % -- --   05/31/19 2110 (!) 116/54 -- -- 93 13 96 % -- --   05/31/19 2105 (!) 113/49 -- -- 96 20 92 % -- --   05/31/19 2100 (!) 123/59 -- -- 96 22 94 % -- --   05/31/19 2055 (!) 113/49 -- -- -- -- -- -- --   05/31/19 2050 (!) 123/59 -- -- 94 14 97 % -- --   05/31/19 2030 132/76 -- -- 97 26 99 % -- --   05/31/19 2028 132/76 97.7 °F (36.5 °C) Temporal 99 20 99 % -- --   05/31/19 2014 -- -- -- 93 -- -- -- --   05/31/19 1139 134/85 98.1 °F (36.7 °C) Tympanic 74 18 97 % 5' 8\" (1.727 m) 240 lb (108.9 kg)       Intake/Output Summary (Last 24 hours) at 6/1/2019 0805  Last data filed at 6/1/2019 8479  Gross per 24 hour   Intake 2625 ml   Output 1740 ml   Net 885 ml       Recent Labs     05/31/19 1239 05/31/19 2129 06/01/19  0630   WBC  --  16.4* 15.4*   HGB 14.2 13.9 13.4   HCT 43.1 42.5 42.1   MCV  --  88.2 87.3   PLT  --  226 254     Recent Labs     05/31/19  1239 05/31/19 2129 06/01/19  0630    137 136   K 4.0 4.0 3.9    100 100   CO2 26 21 24   BUN 16 21* 19   CREATININE 0.87 1.42* 0.93       No results for input(s): COLORU, PHUR, LABCAST, WBCUA, RBCUA, MUCUS, TRICHOMONAS, YEAST, BACTERIA, CLARITYU, SPECGRAV, LEUKOCYTESUR, UROBILINOGEN, Clarita Prior in the last 72 hours.    Invalid input(s): NITRATE, GLUCOSEUKETONESUAMORPHOUS    Additional Lab/culture results: None    Physical Exam:   AAOx3  NAD  Unlabored breathing  Normal rate  Abdomen soft, appropriately tender to palpation, nondistended  Incisions clean, dry, and intact with skin glue. JESSICA drain sersang  : Pérez in place draining yellow urine  No calf tenderness to palpation     Interval Imaging Findings: None    Impression:   66 y.o. male POD #1 s/p RALP    Plan:   JESSICA Cr 1.5, serum Cr 0.93. Maintain JESSICA for now. Hgb stable at 13.4 from 13.9.   Ambulate/out of bed  Incentive spirometry   Pain control  Regular diet   Bowel regimen  Discharge planning      Rony Wiseman  PGY-3,Urology  8:05 AM 6/1/2019

## 2019-06-01 NOTE — OP NOTE
FACILITY:  [address] [place] [address]     DATE:  5/31/2019     PATIENT: Catherine Del Toro   MRN: [id number]      SURGEON:  SUGEY EID. ASSISTANT:  MD Regan Saavedra MD      PREOPERATIVE DIAGNOSIS:  Prostate cancer. POSTOPERATIVE DIAGNOSIS:  Prostate cancer. PROCEDURE:  Robotic radical prostatectomy with bilateral pelvic lymph node dissection. ANESTHESIA:  General.      COMPLICATIONS:  None. ESTIMATED BLOOD LOSS:  < 50 cc     INTRAVENOUS FLUIDS:  Crystalloid per anesthesia     DRAINS:  Urethral 18 Fr houser catheter   15 Fr JESSICA Drain       NARRATIVE PROCEDURE IS AS FOLLOWS:  All the options were presented to the patient. Catherine Del Toro had documented Jaspreet 4+4 = 8 Prostate cancer and PSA of 8.24. The patient decided to proceed with the above procedure. All the risks and benefits were explained to the patient, informed consent was obtained. DESCRIPTION OF PROCEDURE:  This patient was given preoperative anticoagulation and antibiotics and was brought back to the operating room and positioned in the supine position. General anesthesia was started. He was secured to the bed and then he was sterilely prepped and draped in standard fashion. An 25 Kyr[place] Houser catheter was placed and a supraumbilical skin incision was made, a Veress needle was placed through this into the peritoneum. Pneumoperitoneum was obtained. An 8 mm port was placed through this incision into the peritoneum. The peritoneum was examined. No injuries were noted. The rest of the ports were placed in the usual fashion. Three robotic 8 mm ports were placed into assistant, one 12 mm, one 5 mm ports were placed under direct vision. After this the robot was then docked and the procedure was started by reflecting the bladder in the usual fashion by incising lateral to the medial umbilical ligaments and transecting the urachus. The fat off the anterior aspect of the prostate was excised. Endopelvic fascia on each side was incised. The dorsal vein was ligated with a figure-of-8 stitch of 0-V-Loc. This was pexed to the pubic bone. The bladder was then transected making sure not to enter the prostate and making sure not to enter theureteral orifices. The anterior Denonvilliers fascia was identified, it was incised. The seminal vesicles and vasa were dissected and lifted anteriorly. The posterior Denonvilliers fascia was incised and the rectum was swept off the posterior lateral aspect of the prostate. The prostatic pedicles were ligated and divided with Hem-o-Jakob clips. This progressed on both sides towards the apex of the prostate, then the dorsal venous complex was incised, the urethra was dissected and then transected maintaining urethral length. The rectourethralis was transected, the prostate was free and the pelvis was irrigated, no injuries were noted. There was adequate hemostasis. The right pelvic lymph node dissection was performed by sampling lymph nodes between the iliac vein and the obturator nerve, making sure not to injure the structures, the same was on the left side by sampling the nodes between the iliac vein and the obturator nerve, making sure not to injure the structures. After this the lymph nodes and the prostate were placed into an EndoCatch bag. The Lymph nodes were sent as a seperate pathological specimen. Next, the Andrade stitch was performed with a 3-0 V Lock and a figure-of-8 stitch manner, bringing together posterior Denonvilliers fascia and the rectourethralis was tied down. The bladder urethra anastomosis was performed with a 3-0 quill stitch in a running fashion. Of note the posterior urethra did appear torn several millimeters during inspection while competing the anastomosis. Therefore large bits were taken and the Pérez was used as a scaffold. The bladder was irrigated, there was no extravasation noted.  We decided to leave a drain 15 Fr JESSICA drain into the peritoneum through on of the lateral port incisions. Then the robot was undocked, the umbilical skin incision was elongated, the specimen bag was removed through this incision, this incision was closed with figure-of-8 stitches of 0-Vicryl. All the skin incisions were closed with 4-0 Monocryl. Dermabond was placed and that was the end of the  procedure. The patient will be admitted for routine postoperative care. Dr. Bridger Donahue was present and scrubbed throughout the entire case.

## 2019-06-01 NOTE — PROGRESS NOTES
Patient instructed on houser catheter care, good hand washing techniques, and changing to leg bag and back to drainage bag. Verbalized understanding. Discharged per wheelchair in stable condition accompanied by brother.

## 2019-06-01 NOTE — CARE COORDINATION
Case Management Initial Discharge Plan  [place],             Met with:patient to discuss discharge plans. Information verified: address, contacts, phone number, , insurance Yes  PCP: CARMELA Lanier CNP  Date of last visit: states he sees another NP at [facility]. ARENDAL is not there any more    Insurance Provider: [facility]    Discharge Planning    Living Arrangements:  Alone   Support Systems:  Family Members, Friends/Neighbors    Home has  2 stories   stairs to climb to get into front door, yes stairs to climb to reach second floor  Location of bedroom/bathroom in home 2nd    Patient able to perform ADL's:Independent    Current Services (outpatient & in home) none  DME equipment: none  DME provider:     Pharmacy: [facility] Medications:  No  Does patient want to participate in local refill/ meds to beds program?  No    Potential Assistance Needed:  N/A    Patient agreeable to home care: No  [place] of choice provided:  n/a    Prior SNF/Rehab Placement and Facility: no  Agreeable to SNF/Rehab: No  [place] of choice provided: n/a   Evaluation: n/a    Expected Discharge date:  19  Patient expects to be discharged to:  home  Follow Up Appointment: Best Day/ Time:      Transportation provider: self  Transportation arrangements needed for discharge: No    Readmission Risk              Risk of Unplanned Readmission:        8             Does patient have a readmission risk score greater than 14?: No  If yes, follow-up appointment must be made within 7 days of discharge. Discharge Plan: Pt lives alone. States he will have people to help him at discharge. Declines home care needs at this time. Plan home independently.           Electronically signed by Ger Hanna RN on 19 at 10:02 AM

## 2019-06-01 NOTE — PROGRESS NOTES
Restraints removed from ankles at 2008 by Hayder Salinas RN. Pulses were assessed  and present bilat by Dr Jose Francisco Faulkner. Nathanael Conrad RN 5.31.19 @  22 [id number].

## 2019-06-01 NOTE — PLAN OF CARE
Problem: Falls - Risk of:  Goal: Will remain free from falls  Description  Will remain free from falls  Outcome: Met This Shift  Goal: Absence of physical injury  Description  Absence of physical injury  Outcome: Met This Shift     Problem: SAFETY  Goal: Free from accidental physical injury  Outcome: Met This Shift  Goal: Free from intentional harm  Outcome: Met This Shift     Problem: DAILY CARE  Goal: Daily care needs are met  Outcome: Met This Shift     Problem: PAIN  Goal: Patient's pain/discomfort is manageable  Outcome: Met This Shift

## 2019-06-01 NOTE — DISCHARGE SUMMARY
DISCHARGE SUMMARY NOTE:      Patient Identification  PATIENT: Maxim Thornton is a 66 y.o. male. MRN: [id number]  :  1960  Admit Date:  2019  Discharge date:   2019                                   Disposition: home  Discharged Condition:  good  Discharge Diagnoses:   Patient Active Problem List   Diagnosis    Plantar wart    Dysuria    Prostate cancer ([facility] [address]       Consults: none    Surgery: robotic assisted radical prostatectomy     Patient Instructions: Activity: no lifting >10 lbs for 4 weeks  Diet: As tolerated  Patient told to follow up with Dr. July Estrada in 2 weeks  Discharge Medications:    [place], [address] Medication Instructions ZLE:[id number]    Printed on:19 7858   Medication Information                      ciprofloxacin (CIPRO) 500 MG tablet  Take 1 tablet by mouth 2 times daily for 3 days Take day before, day of, and day after appointment for Pérez catheter removal.             docusate sodium (COLACE, DULCOLAX) 100 MG CAPS  Take 100 mg by mouth 2 times daily While taking narcotic pain medication. Hold for loose stools. fluticasone (FLONASE) 50 MCG/ACT nasal spray               lisinopril-hydrochlorothiazide (PRINZIDE;ZESTORETIC) 20-12.5 MG per tablet  TK 1 T PO DAILY             loratadine (CLARITIN) 10 MG tablet  Take 10 mg by mouth daily             meclizine (ANTIVERT) 25 MG tablet  Take 25 mg by mouth 3 times daily as needed for Dizziness             oxyCODONE (ROXICODONE) 5 MG immediate release tablet  Take 1 tablet by mouth every 6 hours as needed for Pain for up to 5 days. Hospital course: The patient did well in their hospital course. The patient had pain controlled with PO meds, tolerated diet, and was ambulating appropriately. The patient was afebrile for 24 hrs before discharge. The patient did have post operative left testicular pain that improved during hospital stay. JESSICA Cr was 1.5 compared to serum creatinine 0.93 on POD #1. Therefore JESSICA Cr was rechecked later in day, found to be 1.1, and subsequently removed. The patient agrees to discharge, understands discharge instructions, and agrees to follow up.     Dc Drains: Pérez catheter   Wound instruction: Keep incisions clean and dry  Rx medications: Percocet, Colace, Cipro for time of catheter removal       Kye Lugo  8:11 AM 6/1/2019

## 2019-06-01 NOTE — PROGRESS NOTES
Pt  c/o of pain to left testicle rated at 8 on 0-10 scale. with mild swelling to testes. Medicated for pain. Resident made aware.

## 2019-06-03 NOTE — ANESTHESIA POSTPROCEDURE EVALUATION
Department of Anesthesiology  Postprocedure Note    Patient: Jenifer Sena  MRN: [id number]  YOB: 1960  Date of evaluation: 6/3/2019  Time:  9:01 AM     Procedure Summary     Date:  05/31/19 Room / Location:  [facility] OR  / [facility] OR    Anesthesia Start:  6512 Anesthesia Stop:  2022    Procedure:  XI LAPAROSCOPIC ROBOTIC RADICAL PROSTATECTOMY, PELVIC LYMPH NODE DISSECTION (N/A ) Diagnosis:  (PROSTATE CANCER)    Surgeon:  Deangelo Charles MD Responsible Provider:  Luna Ruiz MD    Anesthesia Type:  Not recorded ASA Status:  3          Anesthesia Type: No value filed. Aura Phase I: Aura Score: 9    Aura Phase II:      Last vitals: Reviewed and per EMR flowsheets.        Anesthesia Post Evaluation    Patient location during evaluation: PACU  Patient participation: complete - patient participated  Level of consciousness: awake and alert  Pain score: 3  Airway patency: patent  Nausea & Vomiting: no nausea and no vomiting  Complications: no  Cardiovascular status: hemodynamically stable  Respiratory status: acceptable  Hydration status: euvolemic

## 2019-06-04 LAB — SURGICAL PATHOLOGY REPORT: NORMAL

## 2019-06-07 ENCOUNTER — HOSPITAL ENCOUNTER (OUTPATIENT)
Age: 59
Discharge: HOME | End: 2019-06-07
Payer: MEDICAID

## 2019-06-07 ENCOUNTER — HOSPITAL ENCOUNTER (OUTPATIENT)
Dept: GENERAL RADIOLOGY | Age: 59
Discharge: HOME | End: 2019-06-09
Payer: MEDICAID

## 2019-06-07 ENCOUNTER — HOSPITAL ENCOUNTER (OUTPATIENT)
Age: 59
Discharge: HOME | End: 2019-06-09
Payer: MEDICAID

## 2019-06-07 DIAGNOSIS — M79.605 LEFT LEG PAIN: ICD-10-CM

## 2019-06-07 LAB — URIC ACID: 6.7 MG/DL (ref 3.4–7)

## 2019-06-07 PROCEDURE — 36415 COLL VENOUS BLD VENIPUNCTURE: CPT

## 2019-06-07 PROCEDURE — 84550 ASSAY OF BLOOD/URIC ACID: CPT

## 2019-06-07 PROCEDURE — 73630 X-RAY EXAM OF FOOT: CPT

## 2019-06-13 DIAGNOSIS — C61 PROSTATE CANCER (HCC): Primary | ICD-10-CM

## 2019-06-14 ENCOUNTER — HOSPITAL ENCOUNTER (OUTPATIENT)
Dept: GENERAL RADIOLOGY | Age: 59
Discharge: HOME | End: 2019-06-16
Payer: MEDICAID

## 2019-06-14 DIAGNOSIS — C61 PROSTATE CANCER (HCC): ICD-10-CM

## 2019-06-14 PROCEDURE — 74430 CONTRAST X-RAY BLADDER: CPT

## 2019-06-14 PROCEDURE — 6360000004 HC RX CONTRAST MEDICATION: Performed by: UROLOGY

## 2019-06-14 PROCEDURE — 51600 INJECTION FOR BLADDER X-RAY: CPT

## 2019-06-14 RX ADMIN — IOTHALAMATE MEGLUMINE 250 ML: 172 INJECTION URETERAL at 08:56

## 2019-06-17 ENCOUNTER — OFFICE VISIT (OUTPATIENT)
Dept: PODIATRY | Age: 59
End: 2019-06-17
Payer: MEDICAID

## 2019-06-17 VITALS
WEIGHT: 234 LBS | DIASTOLIC BLOOD PRESSURE: 81 MMHG | HEART RATE: 74 BPM | HEIGHT: 68 IN | SYSTOLIC BLOOD PRESSURE: 130 MMHG | BODY MASS INDEX: 35.46 KG/M2

## 2019-06-17 DIAGNOSIS — S84.92XA NEURAPRAXIA OF LEFT LOWER EXTREMITY, INITIAL ENCOUNTER: Primary | ICD-10-CM

## 2019-06-17 PROCEDURE — G8417 CALC BMI ABV UP PARAM F/U: HCPCS | Performed by: STUDENT IN AN ORGANIZED HEALTH CARE EDUCATION/TRAINING PROGRAM

## 2019-06-17 PROCEDURE — 99213 OFFICE O/P EST LOW 20 MIN: CPT | Performed by: STUDENT IN AN ORGANIZED HEALTH CARE EDUCATION/TRAINING PROGRAM

## 2019-06-17 PROCEDURE — G8427 DOCREV CUR MEDS BY ELIG CLIN: HCPCS | Performed by: STUDENT IN AN ORGANIZED HEALTH CARE EDUCATION/TRAINING PROGRAM

## 2019-06-17 PROCEDURE — 1036F TOBACCO NON-USER: CPT | Performed by: STUDENT IN AN ORGANIZED HEALTH CARE EDUCATION/TRAINING PROGRAM

## 2019-06-17 PROCEDURE — 3017F COLORECTAL CA SCREEN DOC REV: CPT | Performed by: STUDENT IN AN ORGANIZED HEALTH CARE EDUCATION/TRAINING PROGRAM

## 2019-06-17 PROCEDURE — 99202 OFFICE O/P NEW SF 15 MIN: CPT | Performed by: STUDENT IN AN ORGANIZED HEALTH CARE EDUCATION/TRAINING PROGRAM

## 2019-06-17 NOTE — PROGRESS NOTES
Patient instructed to remove shoes and socks, instructed to sit in exam chair. Current PCP name is pietro means and date of last visit 1/2019. Do you have a follow up visit scheduled?   No

## 2019-06-17 NOTE — PROGRESS NOTES
[address]  [address] [place] [address],  DUSTIN Buckley  Tel: [phone]   Fax: [phone]    Subjective     CC: Left foot pain    HPI:  Prema Soares is a 66y.o. year old male who presents to clinic today s/p prostatectomy on 5/31/19. The patient states immediately after surgery hyperparesthesias to the left lower extremity he began to experience originating at the common fibular head. The patient states his allodynia and hyperparesthesias have progressively improved over the past 2 weeks. Patient is still complaining of hyper paresthesias worse at the dorsal foot. Patient is nondiabetic. Patient denies any alcohol use. Patient denies any history of acute trauma. Primary care physician is CARMELA Hogan CNP.    ROS:    Constitutional: Denies nausea, vomiting, fever, chills. Neurologic: Denies numbness, tingling, and burning in the feet. Vascular: Denies symptoms of lower extremity claudication. Skin: Denies open wounds. Otherwise negative except as noted in the HPI.      PMH:  Past Medical History:   Diagnosis Date    Hematuria     Hypertension     DILAN (obstructive sleep apnea)     DOES NOT USE MACHINE       Surgical History:   Past Surgical History:   Procedure Laterality Date    APPENDECTOMY      CYSTOSCOPY N/A 2/7/2019    CYSTOSCOPY performed by Valentin Berg MD at [address] [facility],[address] Right 2004    NECK    PELVIC LAPAROSCOPY  05/2019    Prostatectomy- pelvic lymph node dissection    PROSTATE BIOPSY N/A 3/21/2019    PROSTATE BIOPSY, ULTRASOUND  (OFFICE TO CONTACT [place] performed by Valentin Berg MD at [facility] 5/31/2019    XI LAPAROSCOPIC ROBOTIC RADICAL PROSTATECTOMY, PELVIC LYMPH NODE DISSECTION performed by Valentin Berg MD at [address] [place] [address]         Social History:  Social History     Tobacco Use    Smoking status: Never Smoker    Smokeless tobacco: Never Used   Substance Use Topics    Alcohol use: No    Drug use: No       Medications:  Prior to Admission medications    Medication Sig Start Date End Date Taking? Authorizing Provider   docusate sodium (COLACE, DULCOLAX) 100 MG CAPS Take 100 mg by mouth 2 times daily While taking narcotic pain medication. Hold for loose stools. 6/1/19  Yes Frederick Sainz MD   fluticasone (FLONASE) 50 MCG/ACT nasal spray  4/23/19  Yes Historical Provider, MD   meclizine (ANTIVERT) 25 MG tablet Take 25 mg by mouth 3 times daily as needed for Dizziness   Yes Historical Provider, MD   loratadine (CLARITIN) 10 MG tablet Take 10 mg by mouth daily   Yes Historical Provider, MD   lisinopril-hydrochlorothiazide (PRINZIDE;ZESTORETIC) 20-12.5 MG per tablet TK 1 T PO DAILY 1/14/18  Yes Historical Provider, MD       Objective     Vitals:    06/17/19 1323   BP: 130/81   Pulse: 74       Lab Results   Component Value Date    LABA1C 6.7 (H) 01/04/2019       Physical Exam:  General:  Alert and oriented x3. In no acute distress. Lower Extremity Physical Exam:    Vascular: DP and PT pulses are palpable, Bilateral. CFT <3 seconds to all digits, Bilateral.  No edema, Bilateral.  Hair growth is absent to the level of the digits, Bilateral.     Neuro: Saph/sural/SP/DP/plantar sensation intact to light touch. Protective sensation is intact to 10/10 sites as tested with a 5.07g SWMF, Bilateral.  Positive Tinel sign upon percussion of the medial dorsal cutaneous nerve, common peroneal nerve on the left lower extremity. Hyperparesthesia noted to the distribution of the medial dorsal cutaneous nerve, common peroneal nerve. Musculoskeletal: EHL/FHL/GS/TA gross motor intact. Tenderness to palpation of left dorsal foot. Gross deformity is absent, Bilateral.     Dermatologic: No open lesions, Bilateral.  Interdigital maceration absent, Bilateral.  Nails 1-10 are within normal limits.       Biomechanical Exam:    Right foot: 1st MTPJ: Loaded: 40 Unloaded:45  Right foot: 1st Ray: 8      Right foot: Ankle DF knee extended: 8  Right foot: Ankle DF knee flexed: 10    Left foot: 1st MTPJ: Loaded: 40 Unloaded: 45  Left foot: 1st Ray: 8  Left foot: Ankle DF knee extended: 8  Left foot: Ankle DF knee flexed: 10    Gait Analysis: wnl    Imaging: None. Assessment   Geena Au is a 66 y.o. male with     Diagnosis Orders   1. Neurapraxia of left lower extremity, initial encounter          Plan   · Patient examined and evaluated. · Diagnosis and treatment options discussed in detail  · Given the  clinical history and exam findings it was discussed with patient the most likely etiology is a neuropraxia of the common peroneal nerve. · Discussed with the patient that given his symptoms have improved over the past 2 weeks, will monitor over the next 4 weeks for improvement. · Discussed with patient that if symptoms do not improve he can refer the patient for nerve conduction velocity testing or EMG studies. · Prescription for biomed cream dispensed to the patient. Apply as directed. · Patient to RTC in 4 week(s). · Please, call the office with any questions or concerns     No orders of the defined types were placed in this encounter. No orders of the defined types were placed in this encounter.       Anamaria Pham DPM   Podiatric Medicine & Surgery   6/17/2019 at 2:24 PM

## 2019-06-21 ENCOUNTER — OFFICE VISIT (OUTPATIENT)
Dept: UROLOGY | Age: 59
End: 2019-06-21
Payer: MEDICAID

## 2019-06-21 VITALS
WEIGHT: 235 LBS | HEART RATE: 89 BPM | RESPIRATION RATE: 20 BRPM | SYSTOLIC BLOOD PRESSURE: 135 MMHG | DIASTOLIC BLOOD PRESSURE: 87 MMHG | HEIGHT: 68 IN | BODY MASS INDEX: 35.61 KG/M2

## 2019-06-21 DIAGNOSIS — C61 PROSTATE CANCER (HCC): Primary | ICD-10-CM

## 2019-06-21 LAB
APPEARANCE FLUID: ABNORMAL
BILIRUBIN, POC: ABNORMAL
BLOOD URINE, POC: ABNORMAL
CLARITY, POC: CLEAR
COLOR, POC: YELLOW
GLUCOSE URINE, POC: ABNORMAL
KETONES, POC: ABNORMAL
LEUKOCYTE EST, POC: ABNORMAL
NITRITE, POC: ABNORMAL
PH, POC: 6
PROTEIN, POC: 100
SPECIFIC GRAVITY, POC: 1.02
UROBILINOGEN, POC: 0.2

## 2019-06-21 PROCEDURE — 99024 POSTOP FOLLOW-UP VISIT: CPT | Performed by: UROLOGY

## 2019-06-21 PROCEDURE — 81002 URINALYSIS NONAUTO W/O SCOPE: CPT | Performed by: UROLOGY

## 2019-06-21 PROCEDURE — 99212 OFFICE O/P EST SF 10 MIN: CPT

## 2019-06-21 RX ORDER — GABAPENTIN 300 MG/1
CAPSULE ORAL
Refills: 1 | COMMUNITY
Start: 2019-06-19 | End: 2019-09-18 | Stop reason: ALTCHOICE

## 2019-06-21 RX ORDER — DOXYCYCLINE HYCLATE 100 MG/1
CAPSULE ORAL
Refills: 0 | COMMUNITY
Start: 2019-05-17 | End: 2019-09-18 | Stop reason: ALTCHOICE

## 2019-06-21 RX ORDER — SULFAMETHOXAZOLE AND TRIMETHOPRIM 800; 160 MG/1; MG/1
TABLET ORAL
Refills: 0 | COMMUNITY
Start: 2019-06-10 | End: 2019-09-18 | Stop reason: ALTCHOICE

## 2019-06-21 RX ORDER — CEPHALEXIN 500 MG/1
CAPSULE ORAL
Refills: 0 | COMMUNITY
Start: 2019-06-10 | End: 2019-09-18 | Stop reason: ALTCHOICE

## 2019-06-21 RX ORDER — CYCLOBENZAPRINE HCL 10 MG
TABLET ORAL
Refills: 1 | COMMUNITY
Start: 2019-06-10 | End: 2019-09-18 | Stop reason: ALTCHOICE

## 2019-06-21 RX ORDER — INDOMETHACIN 50 MG/1
CAPSULE ORAL
Refills: 0 | COMMUNITY
Start: 2019-06-10 | End: 2019-09-18 | Stop reason: ALTCHOICE

## 2019-06-21 RX ORDER — IBUPROFEN 800 MG/1
TABLET ORAL
Refills: 0 | COMMUNITY
Start: 2019-05-17 | End: 2019-09-18 | Stop reason: ALTCHOICE

## 2019-06-21 RX ORDER — BENZONATATE 200 MG/1
CAPSULE ORAL
Refills: 0 | COMMUNITY
Start: 2019-05-17 | End: 2019-09-18 | Stop reason: ALTCHOICE

## 2019-06-21 RX ORDER — OXYCODONE HYDROCHLORIDE 5 MG/1
TABLET ORAL
Refills: 0 | COMMUNITY
Start: 2019-06-07 | End: 2019-09-18 | Stop reason: ALTCHOICE

## 2019-06-21 NOTE — PROGRESS NOTES
[address]  [address]  [address] [facility] [address] [place] [address]  Dept: [phone]  Dept Fax: [phone]      [facility] Office Note - Follow Up Visit    Patient:  Jenifer Sena  YOB: 1960  Date: 6/21/2019    The patient is a 66 y.o. male who presents today for evaluation of the following problems: prostate cancer  Chief Complaint   Patient presents with    Post-Op Check     juan removed 1 weeks ago-c/o leakeage        HISTORY OF PRESENT ILLNESS:     Onset was  Months ago  Overall, the problem(s) are better. Severity is described as moderate to severe. Associated Symptoms: No dysuria, no gross hematuria. Current Pain Severity: 3    Here to discuss pathology  Some leg pain        Requested/reviewed records from CARMELA Garner - CNP office and/or outside physician/EMR    (Patient's old records have been requested, reviewed and pertinent findings summarized in today's note.)    Last several PSA's:  Lab Results   Component Value Date    PSA 8.24 (H) 01/18/2019    PSA 2.49 01/02/2015       Last total testosterone:  No results found for: TESTOSTERONE    Urinalysis today:  No results found for this visit on 06/21/19. Last BUN and creatinine:  Lab Results   Component Value Date    BUN 19 06/01/2019     Lab Results   Component Value Date    CREATININE 0.93 06/01/2019       Additional Lab/Culture results: none    Imaging Reviewed during this Office Visit:   Naman Lu MD independently reviewed the images and verified the radiology reports from:    [facility] Cystogram Minimum 3 Vw    Result Date: 6/14/2019  EXAMINATION: CYSTOGRAM 6/14/2019 8:50 am COMPARISON: None.  HISTORY: ORDERING SYSTEM PROVIDED HISTORY: Prostate cancer ([address] TECHNOLOGIST PROVIDED HISTORY: CYSTOGRAM PA/LAT ONE WEEK AFTER PROSTATECTOMY; RADIOLOGIST TO REMOVE JUAN IF NO EXTRAVASATION FLUOROSCOPY DOSE AND TYPE OR TIME AND EXPOSURES: 0.4 minutes 8.2 Gy cm2 FINDINGS: Urinary bladder is elongated in craniocaudal dimension. A total of 250 mL of Cysto-Conray 2 was administered through the patient's Pérez catheter. No evidence for extraluminal contrast to suggest leak. Pérez catheter was discontinued following the examination. No evidence for urethral leak. Pérez catheter was discontinued. PAST MEDICAL, FAMILY AND SOCIAL HISTORY:  Past Medical History:   Diagnosis Date    Hematuria     Hypertension     DILAN (obstructive sleep apnea)     DOES NOT USE MACHINE     Past Surgical History:   Procedure Laterality Date    APPENDECTOMY      CYSTOSCOPY N/A 2/7/2019    CYSTOSCOPY performed by Marisa Medrano MD at [address] [facility],[address] Right 2004    NECK    PELVIC LAPAROSCOPY  05/2019    Prostatectomy- pelvic lymph node dissection    PROSTATE BIOPSY N/A 3/21/2019    PROSTATE BIOPSY, ULTRASOUND  (OFFICE TO CONTACT [place] performed by Marisa Medrano MD at [facility] 5/31/2019    XI LAPAROSCOPIC ROBOTIC RADICAL PROSTATECTOMY, PELVIC LYMPH NODE DISSECTION performed by Marisa Medrano MD at [address]       Family History   Problem Relation Age of Onset    High Blood Pressure Mother     No Known Problems Father     Lupus Sister     No Known Problems Brother     No Known Problems Maternal Grandmother     No Known Problems Maternal Grandfather     No Known Problems Paternal Grandmother     No Known Problems Paternal Grandfather     No Known Problems Sister     No Known Problems Sister     No Known Problems Sister     No Known Problems Brother     No Known Problems Brother     No Known Problems Brother      No outpatient medications have been marked as taking for the 6/21/19 encounter (Office Visit) with Marisa Medrano MD.       Patient has no known allergies.   Social History     Tobacco Use   Smoking Status Never Smoker   Smokeless Tobacco Never Used      (If patient a smoker, smoking cessation counseling offered) Social History     Substance and Sexual Activity   Alcohol Use No       REVIEW OF SYSTEMS:  Constitutional: negative  Eyes: negative  Respiratory: negative  Cardiovascular: negative  Gastrointestinal: negative  Genitourinary: see HPI  Musculoskeletal: negative  Skin: negative   Neurological: negative  Hematological/Lymphatic: negative  Psychological: negative        Physical Exam:    This a 66 y.o. male  Vitals:    06/21/19 1027   BP: 135/87   Pulse: 89   Resp: 20     Body mass index is 35.73 kg/m². Constitutional: Patient in no acute distress; Incisions clean, dry    Assessment and Plan        1. Prostate cancer [facility])               Plan:       Nerve pain in left leg, improved with gabapentin. Likely from surgical positioning  Discussed pathology with patient  Discussed kegel exercises  Follow up in august with bethany with PSA      Prescriptions Ordered:  No orders of the defined types were placed in this encounter.      Orders Placed:  Orders Placed This Encounter   Procedures   Mayuri Serrano MD

## 2019-08-15 ENCOUNTER — HOSPITAL ENCOUNTER (OUTPATIENT)
Age: 59
Discharge: HOME | End: 2019-08-15
Payer: MEDICAID

## 2019-08-15 DIAGNOSIS — C61 PROSTATE CANCER (HCC): ICD-10-CM

## 2019-08-15 LAB — PROSTATE SPECIFIC ANTIGEN: <0.01 UG/L

## 2019-08-15 PROCEDURE — 36415 COLL VENOUS BLD VENIPUNCTURE: CPT

## 2019-08-15 PROCEDURE — 84153 ASSAY OF PSA TOTAL: CPT

## 2019-08-23 ENCOUNTER — OFFICE VISIT (OUTPATIENT)
Dept: UROLOGY | Age: 59
End: 2019-08-23
Payer: MEDICAID

## 2019-08-23 VITALS
DIASTOLIC BLOOD PRESSURE: 81 MMHG | HEART RATE: 80 BPM | SYSTOLIC BLOOD PRESSURE: 148 MMHG | BODY MASS INDEX: 35.77 KG/M2 | WEIGHT: 236 LBS | HEIGHT: 68 IN

## 2019-08-23 DIAGNOSIS — C61 PROSTATE CANCER (HCC): Primary | ICD-10-CM

## 2019-08-23 PROCEDURE — 99024 POSTOP FOLLOW-UP VISIT: CPT | Performed by: UROLOGY

## 2019-08-23 RX ORDER — FLUTICASONE PROPIONATE 50 MCG
SPRAY, SUSPENSION (ML) NASAL
COMMUNITY
Start: 2017-09-21 | End: 2019-08-23 | Stop reason: SDUPTHER

## 2019-08-23 RX ORDER — LISINOPRIL 20 MG/1
20 TABLET ORAL DAILY
COMMUNITY
Start: 2014-12-01 | End: 2020-05-27

## 2019-08-23 RX ORDER — TAMSULOSIN HYDROCHLORIDE 0.4 MG/1
CAPSULE ORAL
Refills: 11 | COMMUNITY
Start: 2019-07-26 | End: 2019-09-18 | Stop reason: ALTCHOICE

## 2019-08-29 DIAGNOSIS — N52.9 ERECTILE DYSFUNCTION, UNSPECIFIED ERECTILE DYSFUNCTION TYPE: Primary | ICD-10-CM

## 2019-08-29 RX ORDER — SILDENAFIL 50 MG/1
TABLET, FILM COATED ORAL
Qty: 30 TABLET | Refills: 3 | Status: SHIPPED | OUTPATIENT
Start: 2019-08-29 | End: 2019-09-03 | Stop reason: SDUPTHER

## 2019-09-03 DIAGNOSIS — N52.9 ERECTILE DYSFUNCTION, UNSPECIFIED ERECTILE DYSFUNCTION TYPE: ICD-10-CM

## 2019-09-03 RX ORDER — SILDENAFIL 100 MG/1
TABLET, FILM COATED ORAL
Qty: 30 TABLET | Refills: 3 | Status: SHIPPED | OUTPATIENT
Start: 2019-09-03 | End: 2019-09-18

## 2019-09-18 ENCOUNTER — OFFICE VISIT (OUTPATIENT)
Dept: DERMATOLOGY | Age: 59
End: 2019-09-18
Payer: MEDICAID

## 2019-09-18 VITALS
BODY MASS INDEX: 36.25 KG/M2 | OXYGEN SATURATION: 95 % | SYSTOLIC BLOOD PRESSURE: 157 MMHG | HEART RATE: 80 BPM | DIASTOLIC BLOOD PRESSURE: 90 MMHG | WEIGHT: 239.2 LBS | HEIGHT: 68 IN

## 2019-09-18 DIAGNOSIS — L91.8 INFLAMED SKIN TAG: Primary | ICD-10-CM

## 2019-09-18 PROCEDURE — 11200 RMVL SKIN TAGS UP TO&INC 15: CPT | Performed by: DERMATOLOGY

## 2019-09-18 NOTE — PROGRESS NOTES
Chief Complaint   Patient presents with    New Patient     Skin tags around the neck/armpits are itchy and painful. He states that it gets stuck on his shirts. On exam, multiple brown to skin colored peduculated soft papules on neck and in axillae, one adjacent to right lateral canthus. Discussed options and patient would like to proceed with tweezer cryotherapy. Dermatology Procedure Note   [facility]  [facility] DERMATOLOG[facility]  [address] [address]  Dept: [phone]  Dept Fax: [phone]      Procedure Date: 9/18/2019  Procedure Time: 1:47 PM    Procedure Practitioner: Kaitlin Lamb MD    Procedure: Skin Tag Removal    Pre-Procedure Diagnosis: irritated skin tags (rub area, some have fallen off when snagged on clothing and bled, itchy)    Post-Procedure Diagnosis: Same as Pre-Procedure Diagnosis    Informed Consent: The procedure and its risks were explained including but not limited to pain, bleeding, infection, permanent scar, permanent pigment alteration and recurrence. Consent to proceed with the procedure was obtained from the patient or the parent by the practitioner    Time Out:  A time out was conducted immediately before starting the procedure that confirmed a final verification of the correct patient, correct procedure, and correct site. Procedure Details:  Cryotherapy with Cryo Tweezers: After verbal consent was obtained including discussion of the risks (lesion persistence, lesion recurrence and hypo/hyperpigmentation) and benefits (resolution of the lesion) 12 total irritated and inflamed skin tags on the neck, axillae, and right cheek were treated. The lesions were grasped with the cryo tweezers and held for 5-10 seconds to achieve adequate freeze without affecting the surrounding skin.     Procedure Performed By: Kaitlin Lamb MD    Estimated Blood Loss: Minimal    Procedure Tolerance: Good    Complication(s): None    Electronically signed by Hilda Hi Francois Hernandez MD on 9/18/19 at 1:47 PM

## 2019-10-09 ENCOUNTER — HOSPITAL ENCOUNTER (OUTPATIENT)
Dept: CT IMAGING | Age: 59
Discharge: HOME | End: 2019-10-11
Payer: MEDICAID

## 2019-10-09 DIAGNOSIS — J32.0 CHRONIC MAXILLARY SINUSITIS: ICD-10-CM

## 2019-10-09 PROCEDURE — 70486 CT MAXILLOFACIAL W/O DYE: CPT

## 2019-10-18 ENCOUNTER — OFFICE VISIT (OUTPATIENT)
Dept: UROLOGY | Age: 59
End: 2019-10-18
Payer: MEDICAID

## 2019-10-18 VITALS
WEIGHT: 239 LBS | HEIGHT: 68 IN | HEART RATE: 78 BPM | DIASTOLIC BLOOD PRESSURE: 82 MMHG | SYSTOLIC BLOOD PRESSURE: 136 MMHG | BODY MASS INDEX: 36.22 KG/M2

## 2019-10-18 DIAGNOSIS — N47.1 PHIMOSIS: ICD-10-CM

## 2019-10-18 DIAGNOSIS — N52.9 ERECTILE DYSFUNCTION, UNSPECIFIED ERECTILE DYSFUNCTION TYPE: ICD-10-CM

## 2019-10-18 DIAGNOSIS — R35.0 URINE FREQUENCY: ICD-10-CM

## 2019-10-18 DIAGNOSIS — N39.41 URGE INCONTINENCE OF URINE: ICD-10-CM

## 2019-10-18 DIAGNOSIS — C61 PROSTATE CANCER (HCC): Primary | ICD-10-CM

## 2019-10-18 DIAGNOSIS — N39.3 STRESS INCONTINENCE: ICD-10-CM

## 2019-10-18 LAB
BILIRUBIN, POC: NEGATIVE
BLOOD URINE, POC: NEGATIVE
CLARITY, POC: CLEAR
COLOR, POC: YELLOW
GLUCOSE URINE, POC: NEGATIVE
KETONES, POC: NEGATIVE
LEUKOCYTE EST, POC: NEGATIVE
NITRITE, POC: NEGATIVE
PH, POC: 6.5
PROTEIN, POC: NEGATIVE
SPECIFIC GRAVITY, POC: 1.02
UROBILINOGEN, POC: 0.2

## 2019-10-18 PROCEDURE — G8427 DOCREV CUR MEDS BY ELIG CLIN: HCPCS | Performed by: UROLOGY

## 2019-10-18 PROCEDURE — G8484 FLU IMMUNIZE NO ADMIN: HCPCS | Performed by: UROLOGY

## 2019-10-18 PROCEDURE — 1036F TOBACCO NON-USER: CPT | Performed by: UROLOGY

## 2019-10-18 PROCEDURE — G8417 CALC BMI ABV UP PARAM F/U: HCPCS | Performed by: UROLOGY

## 2019-10-18 PROCEDURE — 3017F COLORECTAL CA SCREEN DOC REV: CPT | Performed by: UROLOGY

## 2019-10-18 PROCEDURE — 81003 URINALYSIS AUTO W/O SCOPE: CPT | Performed by: UROLOGY

## 2019-10-18 PROCEDURE — 99214 OFFICE O/P EST MOD 30 MIN: CPT | Performed by: UROLOGY

## 2019-10-18 RX ORDER — OXYBUTYNIN CHLORIDE 10 MG/1
10 TABLET, EXTENDED RELEASE ORAL DAILY
Qty: 30 TABLET | Refills: 3 | Status: ON HOLD | OUTPATIENT
Start: 2019-10-18 | End: 2020-05-28 | Stop reason: SDUPTHER

## 2020-01-17 ENCOUNTER — TELEPHONE (OUTPATIENT)
Dept: UROLOGY | Age: 60
End: 2020-01-17

## 2020-04-24 ENCOUNTER — VIRTUAL VISIT (OUTPATIENT)
Dept: UROLOGY | Age: 60
End: 2020-04-24
Payer: COMMERCIAL

## 2020-04-24 VITALS — BODY MASS INDEX: 36.37 KG/M2 | HEIGHT: 68 IN | WEIGHT: 240 LBS

## 2020-04-24 PROCEDURE — 99214 OFFICE O/P EST MOD 30 MIN: CPT | Performed by: UROLOGY

## 2020-04-24 RX ORDER — GABAPENTIN 100 MG/1
100 CAPSULE ORAL 2 TIMES DAILY
Qty: 180 CAPSULE | Refills: 1 | Status: ON HOLD | OUTPATIENT
Start: 2020-04-24 | End: 2020-08-10 | Stop reason: HOSPADM

## 2020-04-24 NOTE — PROGRESS NOTES
Derek Gold MD        [facility]YS[facility]ANS  [facility] [address]  [address] [place] [id number]  Dept: [phone]  Dept Fax: [phone] [facility] Urology Office Note -     Patient:  Roxanne Castro  YOB: 1960  Date: 4/24/2020    The patient is a 64 y.o. male who presents today for evaluation of the following problems:   Chief Complaint   Patient presents with    Follow-up     Hx prostate cancer    referred/consultation requested by CARMELA Barry CNP. HISTORY OF PRESENT ILLNESS:     Prostate cancer  Onset was  Months ago  Overall, the problem(s) are unchanged. Severity is described as severe  Associated Symptoms: No dysuria, no gross hematuria. Current Pain Severity: 2    Has not had recent PSA    JUSTIN  Onset was  Months ago  Overall, the problem(s) are worse. Severity is described as moderate    Continued incontinence post prostatectomy  Some OAB symptoms, nocturia  Does have some pain       ED  Onset was  Months ago  Overall, the problem(s) are unchanged. Severity is described as Severe    Sildenafil not effective. Phimosis  Onset was  Years ago  Overall, the problem(s) are unchanged. Severity is described as mild      Does not want circumcision            Requested/reviewed records from CARMELA Barry CNP office and/or outside physician/EMR    (Patient's old records have been requested, reviewed and pertinent findings summarized in today's note.)    Last several PSA's:  Lab Results   Component Value Date    PSA <0.01 08/15/2019    PSA 8.24 (H) 01/18/2019    PSA 2.49 01/02/2015       Last total testosterone:  No results found for: TESTOSTERONE    Urinalysis today:  No results found for this visit on 04/24/20.     Last BUN and creatinine:  Lab Results   Component Value Date    BUN 19 06/01/2019     Lab Results   Component Value Date    CREATININE 0.93 06/01/2019         Imaging Reviewed during this Office Visit:   [place] No Known Problems Maternal Grandmother     No Known Problems Maternal Grandfather     No Known Problems Paternal Grandmother     No Known Problems Paternal Grandfather     No Known Problems Sister     No Known Problems Sister     No Known Problems Sister     No Known Problems Brother     No Known Problems Brother     No Known Problems Brother      No outpatient medications have been marked as taking for the 4/24/20 encounter (Virtual Visit) with Meredith Turner MD.       Patient has no known allergies. Social History     Tobacco Use   Smoking Status Never Smoker   Smokeless Tobacco Never Used      (If patient a smoker, smoking cessation counseling offered)   Social History     Substance and Sexual Activity   Alcohol Use No       REVIEW OF SYSTEMS:  Constitutional: negative  Eyes: negative  Respiratory: negative  Cardiovascular: negative  Gastrointestinal: negative  Genitourinary: see HPI  Musculoskeletal: negative  Skin: negative   Neurological: negative  Hematological/Lymphatic: negative  Psychological: negative      Physical Exam:    This a 64 y.o. male  There were no vitals filed for this visit. Body mass index is 36.49 kg/m². Constitutional: Patient in no acute distress;       Assessment and Plan        1. Prostate cancer ([facility].)    2. Stress incontinence    3. Erectile dysfunction, unspecified erectile dysfunction type    4. Phimosis               Plan:       Has some neuropathic pain after surgery (leg pain/numbness) will try gabapentin  Phimosis- does not want circumcision  Does have pain with voiding- concerned about bladder neck contracture  Cystoscopy local   PSA needs to be drawn. Hx of prostate cancer screening. Tabitha Phipps was evaluated by a Virtual Visit (video visit) encounter to address concerns as mentioned above. A caregiver was present when appropriate.  Due to this being a TeleHealth encounter (During [facility]- public health emergency), evaluation of the following organ systems was limited: Vitals/Constitutional/EENT/Resp/CV/GI//MS/Neuro/Skin/Heme-Lymph-Imm. Pursuant to the emergency declaration under the [address] [place] [address] and the [employer] Act, this Virtual Visit was conducted with patient's (and/or legal guardian's) consent, to reduce the patient's risk of exposure to COVID-19 and provide necessary medical care. The patient (and/or legal guardian) has also been advised to contact this office for worsening conditions or problems, and seek emergency medical treatment and/or call 911 if deemed necessary. I was located at home. Patient was located at home. Services were provided through a video synchronous discussion virtually to substitute for in-person clinic visit. This encounter was initiated by the patient. Prescriptions Ordered:  No orders of the defined types were placed in this encounter. Orders Placed:  No orders of the defined types were placed in this encounter.            Page Zamora MD

## 2020-05-08 ENCOUNTER — HOSPITAL ENCOUNTER (OUTPATIENT)
Age: 60
Discharge: HOME | End: 2020-05-08
Payer: COMMERCIAL

## 2020-05-09 ENCOUNTER — HOSPITAL ENCOUNTER (OUTPATIENT)
Age: 60
Discharge: HOME | End: 2020-05-09
Payer: COMMERCIAL

## 2020-05-09 LAB
ABSOLUTE EOS #: 0.23 K/UL (ref 0–0.44)
ABSOLUTE IMMATURE GRANULOCYTE: <0.03 K/UL (ref 0–0.3)
ABSOLUTE LYMPH #: 2.08 K/UL (ref 1.1–3.7)
ABSOLUTE MONO #: 0.54 K/UL (ref 0.1–1.2)
ALBUMIN SERPL-MCNC: 4.3 G/DL (ref 3.5–5.2)
ALBUMIN/GLOBULIN RATIO: 1.3 (ref 1–2.5)
ALP BLD-CCNC: 59 U/L (ref 40–129)
ALT SERPL-CCNC: 50 U/L (ref 5–41)
ANION GAP SERPL CALCULATED.3IONS-SCNC: 13 MMOL/L (ref 9–17)
AST SERPL-CCNC: 29 U/L
BASOPHILS # BLD: 1 % (ref 0–2)
BASOPHILS ABSOLUTE: 0.03 K/UL (ref 0–0.2)
BILIRUB SERPL-MCNC: 1.42 MG/DL (ref 0.3–1.2)
BUN BLDV-MCNC: 18 MG/DL (ref 6–20)
BUN/CREAT BLD: ABNORMAL (ref 9–20)
CALCIUM SERPL-MCNC: 9.1 MG/DL (ref 8.6–10.4)
CHLORIDE BLD-SCNC: 103 MMOL/L (ref 98–107)
CHOLESTEROL, FASTING: 182 MG/DL
CHOLESTEROL/HDL RATIO: 3.3
CO2: 23 MMOL/L (ref 20–31)
CREAT SERPL-MCNC: 0.86 MG/DL (ref 0.7–1.2)
DIFFERENTIAL TYPE: NORMAL
EOSINOPHILS RELATIVE PERCENT: 4 % (ref 1–4)
GFR AFRICAN AMERICAN: >60 ML/MIN
GFR NON-AFRICAN AMERICAN: >60 ML/MIN
GFR SERPL CREATININE-BSD FRML MDRD: ABNORMAL ML/MIN/{1.73_M2}
GFR SERPL CREATININE-BSD FRML MDRD: ABNORMAL ML/MIN/{1.73_M2}
GLUCOSE BLD-MCNC: 105 MG/DL (ref 70–99)
HAV IGM SER IA-ACNC: NONREACTIVE
HCT VFR BLD CALC: 47.5 % (ref 40.7–50.3)
HDLC SERPL-MCNC: 55 MG/DL
HEMOGLOBIN: 15.6 G/DL (ref 13–17)
HEPATITIS B CORE IGM ANTIBODY: NONREACTIVE
HEPATITIS B SURFACE ANTIGEN: NONREACTIVE
HEPATITIS C ANTIBODY: NONREACTIVE
IMMATURE GRANULOCYTES: 0 %
LDL CHOLESTEROL: 101 MG/DL (ref 0–130)
LYMPHOCYTES # BLD: 33 % (ref 24–43)
MCH RBC QN AUTO: 28.5 PG (ref 25.2–33.5)
MCHC RBC AUTO-ENTMCNC: 32.8 G/DL (ref 28.4–34.8)
MCV RBC AUTO: 86.8 FL (ref 82.6–102.9)
MONOCYTES # BLD: 9 % (ref 3–12)
NRBC AUTOMATED: 0 PER 100 WBC
PDW BLD-RTO: 12.8 % (ref 11.8–14.4)
PLATELET # BLD: 227 K/UL (ref 138–453)
PLATELET ESTIMATE: NORMAL
PMV BLD AUTO: 11.8 FL (ref 8.1–13.5)
POTASSIUM SERPL-SCNC: 3.9 MMOL/L (ref 3.7–5.3)
PROSTATE SPECIFIC ANTIGEN: <0.01 UG/L
PROSTATE SPECIFIC ANTIGEN: <0.01 UG/L
RBC # BLD: 5.47 M/UL (ref 4.21–5.77)
RBC # BLD: NORMAL 10*6/UL
SEG NEUTROPHILS: 53 % (ref 36–65)
SEGMENTED NEUTROPHILS ABSOLUTE COUNT: 3.5 K/UL (ref 1.5–8.1)
SODIUM BLD-SCNC: 139 MMOL/L (ref 135–144)
TOTAL PROTEIN: 7.7 G/DL (ref 6.4–8.3)
TRIGLYCERIDE, FASTING: 129 MG/DL
TSH SERPL DL<=0.05 MIU/L-ACNC: 4.08 MIU/L (ref 0.3–5)
VITAMIN B-12: 514 PG/ML (ref 232–1245)
VITAMIN D 25-HYDROXY: 19.9 NG/ML (ref 30–100)
VLDLC SERPL CALC-MCNC: NORMAL MG/DL (ref 1–30)
WBC # BLD: 6.4 K/UL (ref 3.5–11.3)
WBC # BLD: NORMAL 10*3/UL

## 2020-05-09 PROCEDURE — 82607 VITAMIN B-12: CPT

## 2020-05-09 PROCEDURE — 82306 VITAMIN D 25 HYDROXY: CPT

## 2020-05-09 PROCEDURE — 85025 COMPLETE CBC W/AUTO DIFF WBC: CPT

## 2020-05-09 PROCEDURE — 83036 HEMOGLOBIN GLYCOSYLATED A1C: CPT

## 2020-05-09 PROCEDURE — 36415 COLL VENOUS BLD VENIPUNCTURE: CPT

## 2020-05-09 PROCEDURE — 84153 ASSAY OF PSA TOTAL: CPT

## 2020-05-09 PROCEDURE — 80074 ACUTE HEPATITIS PANEL: CPT

## 2020-05-09 PROCEDURE — 80053 COMPREHEN METABOLIC PANEL: CPT

## 2020-05-09 PROCEDURE — 80061 LIPID PANEL: CPT

## 2020-05-09 PROCEDURE — 84443 ASSAY THYROID STIM HORMONE: CPT

## 2020-05-10 LAB
ESTIMATED AVERAGE GLUCOSE: 123 MG/DL
HBA1C MFR BLD: 5.9 % (ref 4–6)

## 2020-05-14 ENCOUNTER — TELEPHONE (OUTPATIENT)
Dept: UROLOGY | Age: 60
End: 2020-05-14

## 2020-05-14 NOTE — TELEPHONE ENCOUNTER
Patient is scheduled for a cystoscopy on Thursday 5/28 at 2:00PM and for COVID testing on 5/26 at 3:30PM at [address] [place] [address]. Talked to patient and gave him the date, time and instructions. Patient confirmed and verbalized understanding.   Letter mailed out today

## 2020-05-26 ENCOUNTER — HOSPITAL ENCOUNTER (OUTPATIENT)
Dept: PREADMISSION TESTING | Age: 60
Setting detail: SPECIMEN
Discharge: HOME | End: 2020-05-30
Payer: COMMERCIAL

## 2020-05-26 PROCEDURE — U0004 COV-19 TEST NON-CDC HGH THRU: HCPCS

## 2020-05-27 LAB
SARS-COV-2, PCR: NOT DETECTED
SARS-COV-2, RAPID: NORMAL
SARS-COV-2: NORMAL
SOURCE: NORMAL

## 2020-05-27 RX ORDER — LISINOPRIL AND HYDROCHLOROTHIAZIDE 20; 12.5 MG/1; MG/1
1 TABLET ORAL DAILY
COMMUNITY
End: 2022-10-17

## 2020-05-28 ENCOUNTER — HOSPITAL ENCOUNTER (OUTPATIENT)
Age: 60
Setting detail: OUTPATIENT SURGERY
Discharge: HOME | End: 2020-05-28
Attending: UROLOGY | Admitting: UROLOGY
Payer: COMMERCIAL

## 2020-05-28 VITALS
WEIGHT: 230 LBS | OXYGEN SATURATION: 99 % | RESPIRATION RATE: 18 BRPM | TEMPERATURE: 97.9 F | BODY MASS INDEX: 34.86 KG/M2 | DIASTOLIC BLOOD PRESSURE: 80 MMHG | SYSTOLIC BLOOD PRESSURE: 141 MMHG | HEIGHT: 68 IN | HEART RATE: 65 BPM

## 2020-05-28 LAB — GLUCOSE BLD-MCNC: 105 MG/DL (ref 75–110)

## 2020-05-28 PROCEDURE — 3600000012 HC SURGERY LEVEL 2 ADDTL 15MIN: Performed by: UROLOGY

## 2020-05-28 PROCEDURE — 2709999900 HC NON-CHARGEABLE SUPPLY: Performed by: UROLOGY

## 2020-05-28 PROCEDURE — 7100000040 HC SPAR PHASE II RECOVERY - FIRST 15 MIN: Performed by: UROLOGY

## 2020-05-28 PROCEDURE — 2580000003 HC RX 258: Performed by: UROLOGY

## 2020-05-28 PROCEDURE — 3600000002 HC SURGERY LEVEL 2 BASE: Performed by: UROLOGY

## 2020-05-28 PROCEDURE — 82947 ASSAY GLUCOSE BLOOD QUANT: CPT

## 2020-05-28 PROCEDURE — 6370000000 HC RX 637 (ALT 250 FOR IP): Performed by: UROLOGY

## 2020-05-28 RX ORDER — OXYBUTYNIN CHLORIDE 10 MG/1
10 TABLET, EXTENDED RELEASE ORAL DAILY
Qty: 30 TABLET | Refills: 3 | Status: SHIPPED | OUTPATIENT
Start: 2020-05-28 | End: 2021-01-20

## 2020-05-28 RX ORDER — MAGNESIUM HYDROXIDE 1200 MG/15ML
LIQUID ORAL CONTINUOUS PRN
Status: COMPLETED | OUTPATIENT
Start: 2020-05-28 | End: 2020-05-28

## 2020-05-28 RX ORDER — LIDOCAINE HYDROCHLORIDE 20 MG/ML
JELLY TOPICAL PRN
Status: DISCONTINUED | OUTPATIENT
Start: 2020-05-28 | End: 2020-05-28 | Stop reason: ALTCHOICE

## 2020-05-28 ASSESSMENT — PAIN SCALES - GENERAL: PAINLEVEL_OUTOF10: 0

## 2020-05-28 ASSESSMENT — PAIN - FUNCTIONAL ASSESSMENT: PAIN_FUNCTIONAL_ASSESSMENT: 0-10

## 2020-05-28 NOTE — PROGRESS NOTES
Reviewed discharge instructions with patient and verbalized understanding. Dangled and changed clothing. Discharged to [place] care. Patient calling brother.

## 2020-06-01 NOTE — OP NOTE
Patient:  Holly Terry  MRN: [id number]  YOB: 1960    FACILITY: [address]    DATE:5/28/2020    SURGEON: SUGEY CARNEY [facility], MD     ASSISTANT:none    PREOPERATIVE DIAGNOSIS: STRESS INCONTINENCE    POSTOPERATIVE DIAGNOSIS: mixed incontinence    PROCEDURE PERFORMED: CYSTOSCOPY    ANESTHESIA: Local    ESTIMATED BLOOD LOSS: * No values recorded between 5/28/2020  2:12 PM and 5/28/2020  0:90 PM *     COMPLICATIONS: None immediate    DRAINS: none    SPECIMENS: * No specimens in log *    INDICATIONS FOR PROCEDURE:  The patient is a 64 y.o. male who presents today with STRESS INCONTINENCE here for CYSTOSCOPY. After risks, benefits and alternatives of the procedure were discussed with the patient, the patient elected to proceed. Details: The patient was brought back to the operating room. He was laid in the supine position. His genitals were prepped and draped in usual sterile surgical fashion. 2 % lidocaine jelly was placed per the urethra for pain control. A proper time out was performed. Flexible cystoscope was assembled and passed per the urethra. The anterior urethra was normal without any lesions; the posterior urethra was unremarkable. No bladder neck contracture The bladder was inspected thoroughly and systematically, which did not show any lesions or tumors, stone, fistulous tracts, diverticula. Both ureteral orifices were normal with clear efflux of urine. The patient tolerated the procedure well. The scope was removed intact and without any issues. This concluded the case. He was taken to recovery period and discharged home in stable condition. Plan  He will follow up as scheduled.

## 2020-06-04 ENCOUNTER — TELEPHONE (OUTPATIENT)
Dept: UROLOGY | Age: 60
End: 2020-06-04

## 2020-07-06 ENCOUNTER — HOSPITAL ENCOUNTER (OUTPATIENT)
Dept: PREADMISSION TESTING | Age: 60
Setting detail: SPECIMEN
Discharge: HOME | End: 2020-07-10
Payer: MEDICAID

## 2020-07-06 PROCEDURE — U0004 COV-19 TEST NON-CDC HGH THRU: HCPCS

## 2020-07-07 LAB
SARS-COV-2, PCR: NOT DETECTED
SARS-COV-2, RAPID: NORMAL
SARS-COV-2: NORMAL
SOURCE: NORMAL

## 2020-07-10 ENCOUNTER — HOSPITAL ENCOUNTER (OUTPATIENT)
Age: 60
Setting detail: OUTPATIENT SURGERY
Discharge: HOME HEALTH | End: 2020-07-10
Attending: UROLOGY | Admitting: UROLOGY
Payer: MEDICAID

## 2020-07-10 ENCOUNTER — ANESTHESIA EVENT (OUTPATIENT)
Dept: OPERATING ROOM | Age: 60
End: 2020-07-10
Payer: MEDICAID

## 2020-07-10 ENCOUNTER — ANESTHESIA (OUTPATIENT)
Dept: OPERATING ROOM | Age: 60
End: 2020-07-10
Payer: MEDICAID

## 2020-07-10 VITALS
TEMPERATURE: 97.2 F | DIASTOLIC BLOOD PRESSURE: 77 MMHG | SYSTOLIC BLOOD PRESSURE: 132 MMHG | HEART RATE: 66 BPM | HEIGHT: 68 IN | OXYGEN SATURATION: 95 % | RESPIRATION RATE: 20 BRPM | WEIGHT: 240 LBS | BODY MASS INDEX: 36.37 KG/M2

## 2020-07-10 VITALS — SYSTOLIC BLOOD PRESSURE: 220 MMHG | TEMPERATURE: 96.3 F | DIASTOLIC BLOOD PRESSURE: 198 MMHG | OXYGEN SATURATION: 98 %

## 2020-07-10 LAB
GFR NON-AFRICAN AMERICAN: >60 ML/MIN
GFR SERPL CREATININE-BSD FRML MDRD: >60 ML/MIN
GFR SERPL CREATININE-BSD FRML MDRD: NORMAL ML/MIN/{1.73_M2}
GLUCOSE BLD-MCNC: 102 MG/DL (ref 74–100)
POC CREATININE: 1.07 MG/DL (ref 0.51–1.19)
POC POTASSIUM: 4.1 MMOL/L (ref 3.5–4.5)

## 2020-07-10 PROCEDURE — 2580000003 HC RX 258: Performed by: UROLOGY

## 2020-07-10 PROCEDURE — 2500000003 HC RX 250 WO HCPCS: Performed by: NURSE ANESTHETIST, CERTIFIED REGISTERED

## 2020-07-10 PROCEDURE — 6360000002 HC RX W HCPCS: Performed by: PHYSICIAN ASSISTANT

## 2020-07-10 PROCEDURE — 82947 ASSAY GLUCOSE BLOOD QUANT: CPT

## 2020-07-10 PROCEDURE — 93005 ELECTROCARDIOGRAM TRACING: CPT | Performed by: ANESTHESIOLOGY

## 2020-07-10 PROCEDURE — 3700000001 HC ADD 15 MINUTES (ANESTHESIA): Performed by: UROLOGY

## 2020-07-10 PROCEDURE — 2709999900 HC NON-CHARGEABLE SUPPLY: Performed by: UROLOGY

## 2020-07-10 PROCEDURE — 6370000000 HC RX 637 (ALT 250 FOR IP): Performed by: ANESTHESIOLOGY

## 2020-07-10 PROCEDURE — 7100000000 HC PACU RECOVERY - FIRST 15 MIN: Performed by: UROLOGY

## 2020-07-10 PROCEDURE — 2500000003 HC RX 250 WO HCPCS: Performed by: UROLOGY

## 2020-07-10 PROCEDURE — 3600000003 HC SURGERY LEVEL 3 BASE: Performed by: UROLOGY

## 2020-07-10 PROCEDURE — 6360000002 HC RX W HCPCS: Performed by: NURSE ANESTHETIST, CERTIFIED REGISTERED

## 2020-07-10 PROCEDURE — 6370000000 HC RX 637 (ALT 250 FOR IP): Performed by: NURSE ANESTHETIST, CERTIFIED REGISTERED

## 2020-07-10 PROCEDURE — 3700000000 HC ANESTHESIA ATTENDED CARE: Performed by: UROLOGY

## 2020-07-10 PROCEDURE — 7100000001 HC PACU RECOVERY - ADDTL 15 MIN: Performed by: UROLOGY

## 2020-07-10 PROCEDURE — 84132 ASSAY OF SERUM POTASSIUM: CPT

## 2020-07-10 PROCEDURE — 7100000040 HC SPAR PHASE II RECOVERY - FIRST 15 MIN: Performed by: UROLOGY

## 2020-07-10 PROCEDURE — 7100000041 HC SPAR PHASE II RECOVERY - ADDTL 15 MIN: Performed by: UROLOGY

## 2020-07-10 PROCEDURE — 82565 ASSAY OF CREATININE: CPT

## 2020-07-10 PROCEDURE — 3600000013 HC SURGERY LEVEL 3 ADDTL 15MIN: Performed by: UROLOGY

## 2020-07-10 RX ORDER — MEPERIDINE HYDROCHLORIDE 50 MG/ML
12.5 INJECTION INTRAMUSCULAR; INTRAVENOUS; SUBCUTANEOUS EVERY 5 MIN PRN
Status: DISCONTINUED | OUTPATIENT
Start: 2020-07-10 | End: 2020-07-10 | Stop reason: HOSPADM

## 2020-07-10 RX ORDER — MIDAZOLAM HYDROCHLORIDE 1 MG/ML
INJECTION INTRAMUSCULAR; INTRAVENOUS PRN
Status: DISCONTINUED | OUTPATIENT
Start: 2020-07-10 | End: 2020-07-10 | Stop reason: SDUPTHER

## 2020-07-10 RX ORDER — CEPHALEXIN 500 MG/1
500 CAPSULE ORAL 3 TIMES DAILY
Qty: 21 CAPSULE | Refills: 0 | Status: SHIPPED | OUTPATIENT
Start: 2020-07-10 | End: 2020-07-17

## 2020-07-10 RX ORDER — FENTANYL CITRATE 50 UG/ML
25 INJECTION, SOLUTION INTRAMUSCULAR; INTRAVENOUS EVERY 5 MIN PRN
Status: DISCONTINUED | OUTPATIENT
Start: 2020-07-10 | End: 2020-07-10 | Stop reason: HOSPADM

## 2020-07-10 RX ORDER — DEXAMETHASONE SODIUM PHOSPHATE 10 MG/ML
INJECTION INTRAMUSCULAR; INTRAVENOUS PRN
Status: DISCONTINUED | OUTPATIENT
Start: 2020-07-10 | End: 2020-07-10 | Stop reason: SDUPTHER

## 2020-07-10 RX ORDER — ONDANSETRON 2 MG/ML
4 INJECTION INTRAMUSCULAR; INTRAVENOUS
Status: DISCONTINUED | OUTPATIENT
Start: 2020-07-10 | End: 2020-07-10 | Stop reason: HOSPADM

## 2020-07-10 RX ORDER — NEOSTIGMINE METHYLSULFATE 5 MG/5 ML
SYRINGE (ML) INTRAVENOUS PRN
Status: DISCONTINUED | OUTPATIENT
Start: 2020-07-10 | End: 2020-07-10 | Stop reason: SDUPTHER

## 2020-07-10 RX ORDER — LIDOCAINE HYDROCHLORIDE 10 MG/ML
INJECTION, SOLUTION EPIDURAL; INFILTRATION; INTRACAUDAL; PERINEURAL PRN
Status: DISCONTINUED | OUTPATIENT
Start: 2020-07-10 | End: 2020-07-10 | Stop reason: SDUPTHER

## 2020-07-10 RX ORDER — FENTANYL CITRATE 50 UG/ML
50 INJECTION, SOLUTION INTRAMUSCULAR; INTRAVENOUS EVERY 5 MIN PRN
Status: DISCONTINUED | OUTPATIENT
Start: 2020-07-10 | End: 2020-07-10 | Stop reason: HOSPADM

## 2020-07-10 RX ORDER — ONDANSETRON 2 MG/ML
INJECTION INTRAMUSCULAR; INTRAVENOUS PRN
Status: DISCONTINUED | OUTPATIENT
Start: 2020-07-10 | End: 2020-07-10 | Stop reason: SDUPTHER

## 2020-07-10 RX ORDER — GLYCOPYRROLATE 1 MG/5 ML
SYRINGE (ML) INTRAVENOUS PRN
Status: DISCONTINUED | OUTPATIENT
Start: 2020-07-10 | End: 2020-07-10 | Stop reason: SDUPTHER

## 2020-07-10 RX ORDER — ROCURONIUM BROMIDE 10 MG/ML
INJECTION, SOLUTION INTRAVENOUS PRN
Status: DISCONTINUED | OUTPATIENT
Start: 2020-07-10 | End: 2020-07-10 | Stop reason: SDUPTHER

## 2020-07-10 RX ORDER — BUPIVACAINE HYDROCHLORIDE 2.5 MG/ML
INJECTION, SOLUTION INFILTRATION; PERINEURAL PRN
Status: DISCONTINUED | OUTPATIENT
Start: 2020-07-10 | End: 2020-07-10 | Stop reason: ALTCHOICE

## 2020-07-10 RX ORDER — HYDROCODONE BITARTRATE AND ACETAMINOPHEN 5; 325 MG/1; MG/1
1 TABLET ORAL EVERY 6 HOURS PRN
Qty: 20 TABLET | Refills: 0 | Status: SHIPPED | OUTPATIENT
Start: 2020-07-10 | End: 2020-07-15

## 2020-07-10 RX ORDER — HYDROCODONE BITARTRATE AND ACETAMINOPHEN 5; 325 MG/1; MG/1
1 TABLET ORAL
Status: COMPLETED | OUTPATIENT
Start: 2020-07-10 | End: 2020-07-10

## 2020-07-10 RX ORDER — KETOROLAC TROMETHAMINE 30 MG/ML
INJECTION, SOLUTION INTRAMUSCULAR; INTRAVENOUS PRN
Status: DISCONTINUED | OUTPATIENT
Start: 2020-07-10 | End: 2020-07-10 | Stop reason: SDUPTHER

## 2020-07-10 RX ORDER — PROPOFOL 10 MG/ML
INJECTION, EMULSION INTRAVENOUS PRN
Status: DISCONTINUED | OUTPATIENT
Start: 2020-07-10 | End: 2020-07-10 | Stop reason: SDUPTHER

## 2020-07-10 RX ORDER — M-VIT,TX,IRON,MINS/CALC/FOLIC 27MG-0.4MG
1 TABLET ORAL DAILY
COMMUNITY

## 2020-07-10 RX ORDER — FENTANYL CITRATE 50 UG/ML
INJECTION, SOLUTION INTRAMUSCULAR; INTRAVENOUS PRN
Status: DISCONTINUED | OUTPATIENT
Start: 2020-07-10 | End: 2020-07-10 | Stop reason: SDUPTHER

## 2020-07-10 RX ORDER — SODIUM CHLORIDE, SODIUM LACTATE, POTASSIUM CHLORIDE, CALCIUM CHLORIDE 600; 310; 30; 20 MG/100ML; MG/100ML; MG/100ML; MG/100ML
INJECTION, SOLUTION INTRAVENOUS CONTINUOUS
Status: DISCONTINUED | OUTPATIENT
Start: 2020-07-10 | End: 2020-07-10 | Stop reason: HOSPADM

## 2020-07-10 RX ORDER — ALBUTEROL SULFATE 90 UG/1
AEROSOL, METERED RESPIRATORY (INHALATION) PRN
Status: DISCONTINUED | OUTPATIENT
Start: 2020-07-10 | End: 2020-07-10 | Stop reason: SDUPTHER

## 2020-07-10 RX ORDER — ASCORBIC ACID 500 MG
500 TABLET ORAL DAILY
Status: ON HOLD | COMMUNITY
End: 2021-06-25

## 2020-07-10 RX ORDER — MAGNESIUM HYDROXIDE 1200 MG/15ML
LIQUID ORAL CONTINUOUS PRN
Status: COMPLETED | OUTPATIENT
Start: 2020-07-10 | End: 2020-07-10

## 2020-07-10 RX ADMIN — Medication 5 MG: at 12:49

## 2020-07-10 RX ADMIN — KETOROLAC TROMETHAMINE 30 MG: 30 INJECTION, SOLUTION INTRAMUSCULAR; INTRAVENOUS at 12:48

## 2020-07-10 RX ADMIN — HYDROCODONE BITARTRATE AND ACETAMINOPHEN 1 TABLET: 5; 325 TABLET ORAL at 13:55

## 2020-07-10 RX ADMIN — FENTANYL CITRATE 50 MCG: 50 INJECTION INTRAMUSCULAR; INTRAVENOUS at 12:24

## 2020-07-10 RX ADMIN — FENTANYL CITRATE 50 MCG: 50 INJECTION INTRAMUSCULAR; INTRAVENOUS at 12:34

## 2020-07-10 RX ADMIN — LIDOCAINE HYDROCHLORIDE 50 MG: 10 INJECTION, SOLUTION EPIDURAL; INFILTRATION; INTRACAUDAL; PERINEURAL at 12:13

## 2020-07-10 RX ADMIN — ONDANSETRON 4 MG: 2 INJECTION, SOLUTION INTRAMUSCULAR; INTRAVENOUS at 12:48

## 2020-07-10 RX ADMIN — SODIUM CHLORIDE, POTASSIUM CHLORIDE, SODIUM LACTATE AND CALCIUM CHLORIDE: 600; 310; 30; 20 INJECTION, SOLUTION INTRAVENOUS at 12:00

## 2020-07-10 RX ADMIN — DEXAMETHASONE SODIUM PHOSPHATE 10 MG: 10 INJECTION INTRAMUSCULAR; INTRAVENOUS at 12:23

## 2020-07-10 RX ADMIN — PROPOFOL 150 MG: 10 INJECTION, EMULSION INTRAVENOUS at 12:13

## 2020-07-10 RX ADMIN — ALBUTEROL SULFATE 2 PUFF: 90 AEROSOL, METERED RESPIRATORY (INHALATION) at 13:08

## 2020-07-10 RX ADMIN — MIDAZOLAM HYDROCHLORIDE 2 MG: 1 INJECTION, SOLUTION INTRAMUSCULAR; INTRAVENOUS at 12:09

## 2020-07-10 RX ADMIN — SODIUM CHLORIDE, POTASSIUM CHLORIDE, SODIUM LACTATE AND CALCIUM CHLORIDE: 600; 310; 30; 20 INJECTION, SOLUTION INTRAVENOUS at 13:19

## 2020-07-10 RX ADMIN — ALBUTEROL SULFATE 2 PUFF: 90 AEROSOL, METERED RESPIRATORY (INHALATION) at 13:10

## 2020-07-10 RX ADMIN — Medication 0.8 MG: at 12:49

## 2020-07-10 RX ADMIN — ALBUTEROL SULFATE 2 PUFF: 90 AEROSOL, METERED RESPIRATORY (INHALATION) at 13:06

## 2020-07-10 RX ADMIN — ROCURONIUM BROMIDE 50 MG: 10 INJECTION INTRAVENOUS at 12:14

## 2020-07-10 RX ADMIN — CEFAZOLIN 2 G: 1 INJECTION, POWDER, FOR SOLUTION INTRAMUSCULAR; INTRAVENOUS at 12:19

## 2020-07-10 RX ADMIN — FENTANYL CITRATE 50 MCG: 50 INJECTION INTRAMUSCULAR; INTRAVENOUS at 12:13

## 2020-07-10 ASSESSMENT — PAIN SCALES - GENERAL
PAINLEVEL_OUTOF10: 8
PAINLEVEL_OUTOF10: 2
PAINLEVEL_OUTOF10: 8

## 2020-07-10 ASSESSMENT — PULMONARY FUNCTION TESTS
PIF_VALUE: 1
PIF_VALUE: 6
PIF_VALUE: 20
PIF_VALUE: 15
PIF_VALUE: 26
PIF_VALUE: 1
PIF_VALUE: 18
PIF_VALUE: 18
PIF_VALUE: 22
PIF_VALUE: 18
PIF_VALUE: 3
PIF_VALUE: 0
PIF_VALUE: 25
PIF_VALUE: 20
PIF_VALUE: 28
PIF_VALUE: 21
PIF_VALUE: 3
PIF_VALUE: 25
PIF_VALUE: 18
PIF_VALUE: 18
PIF_VALUE: 21
PIF_VALUE: 19
PIF_VALUE: 17
PIF_VALUE: 24
PIF_VALUE: 5
PIF_VALUE: 21
PIF_VALUE: 28
PIF_VALUE: 2
PIF_VALUE: 18
PIF_VALUE: 20
PIF_VALUE: 5
PIF_VALUE: 20
PIF_VALUE: 18
PIF_VALUE: 20
PIF_VALUE: 7
PIF_VALUE: 19
PIF_VALUE: 18
PIF_VALUE: 20
PIF_VALUE: 20
PIF_VALUE: 10
PIF_VALUE: 20
PIF_VALUE: 19
PIF_VALUE: 23
PIF_VALUE: 18
PIF_VALUE: 17
PIF_VALUE: 17
PIF_VALUE: 19
PIF_VALUE: 2
PIF_VALUE: 19
PIF_VALUE: 18
PIF_VALUE: 23
PIF_VALUE: 2
PIF_VALUE: 20
PIF_VALUE: 24
PIF_VALUE: 31
PIF_VALUE: 0
PIF_VALUE: 1
PIF_VALUE: 19
PIF_VALUE: 20
PIF_VALUE: 20
PIF_VALUE: 18
PIF_VALUE: 20
PIF_VALUE: 1
PIF_VALUE: 19
PIF_VALUE: 20

## 2020-07-10 ASSESSMENT — PAIN DESCRIPTION - PAIN TYPE
TYPE: CHRONIC PAIN
TYPE: SURGICAL PAIN
TYPE: SURGICAL PAIN

## 2020-07-10 ASSESSMENT — ENCOUNTER SYMPTOMS
STRIDOR: 0
SHORTNESS OF BREATH: 0

## 2020-07-10 ASSESSMENT — PAIN DESCRIPTION - LOCATION
LOCATION: PENIS
LOCATION: ARM;LEG
LOCATION: GROIN;PENIS

## 2020-07-10 ASSESSMENT — PAIN DESCRIPTION - ORIENTATION: ORIENTATION: LEFT

## 2020-07-10 ASSESSMENT — PAIN - FUNCTIONAL ASSESSMENT: PAIN_FUNCTIONAL_ASSESSMENT: 0-10

## 2020-07-10 NOTE — ANESTHESIA PRE PROCEDURE
for: PHART, PO2ART, NMP2AKI, QDU9ASQ, BEART, G5VRLQBJ     Type & Screen (If Applicable):  No results found for: LABABO, LABRH    Anesthesia Evaluation   no history of anesthetic complications:   Airway: Mallampati: II  TM distance: >3 FB   Neck ROM: full  Mouth opening: > = 3 FB Dental:          Pulmonary:   (+) sleep apnea:      (-) COPD, asthma, shortness of breath and stridor                           Cardiovascular:    (+) hypertension:,     (-) pacemaker, past MI, CABG/stent,  angina and  CHF        Rate: normal                    Neuro/Psych:      (-) seizures, TIA and CVA           GI/Hepatic/Renal:        (-) GERD, liver disease and no renal disease       Endo/Other:    (+) malignancy/cancer. (-) diabetes mellitus, hypothyroidism, hyperthyroidism, blood dyscrasia               Abdominal:   (+) obese,     Abdomen: soft. Vascular: negative vascular ROS. Anesthesia Plan      general     ASA 3       Induction: intravenous. MIPS: Postoperative opioids intended. Anesthetic plan and risks discussed with patient. Plan discussed with CRNA.                   Brittani Cao MD   7/10/2020

## 2020-07-10 NOTE — H&P
Pre-op History and Physical  [address] Nica Christopher PA-C    Patient:  Ayad Seymour  MRN: [id number]  YOB: 1960    HISTORY OF PRESENT ILLNESS:     The patient is a 64 y.o. male who presents with phimosis. Prior history of prostate cancer s/p RALP. Had cysto recently for incontinence. Here for circumcision. Patient's old records, notes and chart reviewed and summarized above. [address] Nica Christopher PA-C independently reviewed the images and verified the radiology reports from:    No results found. Past Medical History:    Past Medical History:   Diagnosis Date    Hematuria     Hypertension     DILAN (obstructive sleep apnea)     DOES NOT HAVE MACHINE       Past Surgical History:    Past Surgical History:   Procedure Laterality Date    APPENDECTOMY      CYSTOSCOPY N/A 2/7/2019    CYSTOSCOPY performed by Jaison Davis MD at [address] [facility] 5/28/2020    CYSTOSCOPY performed by Jaison Davis MD at [address] [facility] Right 2004    NECK    PROSTATE BIOPSY N/A 3/21/2019    PROSTATE BIOPSY, ULTRASOUND  (OFFICE TO CONTACT [place] performed by Jaison Davis MD at [facility] 5/31/2019    XI LAPAROSCOPIC [facility], PELVIC LYMPH NODE DISSECTION performed by Jaison Davis MD at [address] [facility] SEPTOPLASTY         Medications Prior to Admission:    Prior to Admission medications    Medication Sig Start Date End Date Taking? Authorizing Provider   oxybutynin (DITROPAN XL) 10 MG extended release tablet Take 1 tablet by mouth daily 5/28/20   Arielle Baron PA-C   lisinopril-hydroCHLOROthiazide (PRINZIDE;ZESTORETIC) 20-12.5 MG per tablet Take 1 tablet by mouth daily    Historical Provider, MD   gabapentin (NEURONTIN) 100 MG capsule Take 1 capsule by mouth 2 times daily for 180 days. Intended supply: 90 days 4/24/20 10/21/20  Jaison Davis MD       Allergies:  Patient has no known allergies.     Social History:    Social History     Socioeconomic History    patient does wish to proceed with the procedure at this time.     Destiney Heredia PA-C  10:24 AM 7/10/2020

## 2020-07-10 NOTE — PROGRESS NOTES
PAV requested from Dr. Derrick Brown, Dr. Derrick Brown notified that patient's states is pain is tolerable

## 2020-07-10 NOTE — OP NOTE
[facility]: [place] [address]  1960  [id number]    DATE: 7/10/2020  SURGEON:  HANNAH Banuelos:  Kasey Smart MD  PREOPERATIVE DIAGNOSIS:  Redundant foreskin, Phimosis  POSTOPERATIVE DIAGNOSIS:  Same  PROCEDURES:    1. Circumcision   2. Dorsal penile block for postoperative pain control. ANESTHESIA: General.   COMPLICATIONS: None.   DRAINS: None  SPECIMEN: None. ESTIMATED BLOOD LOSS: Minimal, less than 5 cc.     INDICATIONS:  Kacey Cornelius is a 64 y.o. male with redundant foreskin and phimosis. He elected to undergo the above-mentioned procedure. The risks, benefits, and alternatives as well as possible complications of the procedure were explained to the family and informed consent was obtained.     PROCEDURE:  After informed consent was obtained, the patient was taken to the operating room and placed in supine position. General anesthesia was induced. Time-out was taken to verify patient identity and procedure to be performed and all parties were in agreement. Initial inspection revealed phimosis. present. A preputial cuff was marked out approximately 5 mm proximal to the coronal sulcus. Measurements were then taken to determine the amount of excess foreskin to remove. Once these measurements had confirmed to be accurate incisions were then made using the cut setting of the Bovie along the previously marked sites. Hemostasis was then obtained using electrocautery. Once meticulous hemostasis had been accomplished, the shaft skin was then approximated to the preputial cuff using 3-O chromic catgut sutures x 4 at 4 quadrants. Then the intervening skin and mucosa was approximated all around using 3-O chromic catgut sutures. The wound was then cleaned and bacitracin was then applied circumferentially. The patient was then awakened and transferred to recovery in good condition. The patient tolerated procedure well.   There were no apparent complications. Sponge and needle count was correct at the end of the case. Please note that Dr. Stephany Senior was present and scrubbed for the entire procedure. PLAN:  The patient will be transferred to the PACU for recovery and discharged to home with follow up in urology clinic with Dr. Stephany Senior .

## 2020-07-11 LAB
EKG ATRIAL RATE: 65 BPM
EKG P AXIS: 14 DEGREES
EKG P-R INTERVAL: 172 MS
EKG Q-T INTERVAL: 410 MS
EKG QRS DURATION: 94 MS
EKG QTC CALCULATION (BAZETT): 426 MS
EKG R AXIS: -19 DEGREES
EKG T AXIS: 6 DEGREES
EKG VENTRICULAR RATE: 65 BPM

## 2020-07-11 PROCEDURE — 93010 ELECTROCARDIOGRAM REPORT: CPT | Performed by: INTERNAL MEDICINE

## 2020-08-09 ENCOUNTER — APPOINTMENT (OUTPATIENT)
Dept: CT IMAGING | Age: 60
End: 2020-08-09
Payer: MEDICAID

## 2020-08-09 ENCOUNTER — APPOINTMENT (OUTPATIENT)
Dept: GENERAL RADIOLOGY | Age: 60
End: 2020-08-09
Payer: MEDICAID

## 2020-08-09 ENCOUNTER — HOSPITAL ENCOUNTER (OUTPATIENT)
Age: 60
Setting detail: OBSERVATION
Discharge: HOME | End: 2020-08-10
Attending: EMERGENCY MEDICINE | Admitting: EMERGENCY MEDICINE
Payer: MEDICAID

## 2020-08-09 LAB
ABSOLUTE EOS #: 0.16 K/UL (ref 0–0.44)
ABSOLUTE IMMATURE GRANULOCYTE: <0.03 K/UL (ref 0–0.3)
ABSOLUTE LYMPH #: 2.44 K/UL (ref 1.1–3.7)
ABSOLUTE MONO #: 0.6 K/UL (ref 0.1–1.2)
ANION GAP SERPL CALCULATED.3IONS-SCNC: 17 MMOL/L (ref 9–17)
BASOPHILS # BLD: 1 % (ref 0–2)
BASOPHILS ABSOLUTE: 0.05 K/UL (ref 0–0.2)
BUN BLDV-MCNC: 16 MG/DL (ref 6–20)
BUN/CREAT BLD: ABNORMAL (ref 9–20)
CALCIUM SERPL-MCNC: 9.3 MG/DL (ref 8.6–10.4)
CHLORIDE BLD-SCNC: 103 MMOL/L (ref 98–107)
CO2: 21 MMOL/L (ref 20–31)
CREAT SERPL-MCNC: 1.19 MG/DL (ref 0.7–1.2)
DIFFERENTIAL TYPE: ABNORMAL
EOSINOPHILS RELATIVE PERCENT: 2 % (ref 1–4)
GFR AFRICAN AMERICAN: >60 ML/MIN
GFR NON-AFRICAN AMERICAN: >60 ML/MIN
GFR SERPL CREATININE-BSD FRML MDRD: ABNORMAL ML/MIN/{1.73_M2}
GFR SERPL CREATININE-BSD FRML MDRD: ABNORMAL ML/MIN/{1.73_M2}
GLUCOSE BLD-MCNC: 181 MG/DL (ref 70–99)
HCT VFR BLD CALC: 44.2 % (ref 40.7–50.3)
HEMOGLOBIN: 14.9 G/DL (ref 13–17)
IMMATURE GRANULOCYTES: 0 %
LYMPHOCYTES # BLD: 25 % (ref 24–43)
MCH RBC QN AUTO: 28.7 PG (ref 25.2–33.5)
MCHC RBC AUTO-ENTMCNC: 33.7 G/DL (ref 28.4–34.8)
MCV RBC AUTO: 85.2 FL (ref 82.6–102.9)
MONOCYTES # BLD: 6 % (ref 3–12)
NRBC AUTOMATED: 0 PER 100 WBC
PDW BLD-RTO: 13.2 % (ref 11.8–14.4)
PLATELET # BLD: 225 K/UL (ref 138–453)
PLATELET ESTIMATE: ABNORMAL
PMV BLD AUTO: 11.7 FL (ref 8.1–13.5)
POTASSIUM SERPL-SCNC: 3.6 MMOL/L (ref 3.7–5.3)
RBC # BLD: 5.19 M/UL (ref 4.21–5.77)
RBC # BLD: ABNORMAL 10*6/UL
SEG NEUTROPHILS: 66 % (ref 36–65)
SEGMENTED NEUTROPHILS ABSOLUTE COUNT: 6.39 K/UL (ref 1.5–8.1)
SODIUM BLD-SCNC: 141 MMOL/L (ref 135–144)
TROPONIN INTERP: ABNORMAL
TROPONIN INTERP: NORMAL
TROPONIN T: ABNORMAL NG/ML
TROPONIN T: NORMAL NG/ML
TROPONIN, HIGH SENSITIVITY: 21 NG/L (ref 0–22)
TROPONIN, HIGH SENSITIVITY: 23 NG/L (ref 0–22)
WBC # BLD: 9.7 K/UL (ref 3.5–11.3)
WBC # BLD: ABNORMAL 10*3/UL

## 2020-08-09 PROCEDURE — 6360000002 HC RX W HCPCS: Performed by: EMERGENCY MEDICINE

## 2020-08-09 PROCEDURE — 6370000000 HC RX 637 (ALT 250 FOR IP): Performed by: EMERGENCY MEDICINE

## 2020-08-09 PROCEDURE — 96374 THER/PROPH/DIAG INJ IV PUSH: CPT

## 2020-08-09 PROCEDURE — 71046 X-RAY EXAM CHEST 2 VIEWS: CPT

## 2020-08-09 PROCEDURE — 85025 COMPLETE CBC W/AUTO DIFF WBC: CPT

## 2020-08-09 PROCEDURE — 80048 BASIC METABOLIC PNL TOTAL CA: CPT

## 2020-08-09 PROCEDURE — 71260 CT THORAX DX C+: CPT

## 2020-08-09 PROCEDURE — 6360000004 HC RX CONTRAST MEDICATION: Performed by: STUDENT IN AN ORGANIZED HEALTH CARE EDUCATION/TRAINING PROGRAM

## 2020-08-09 PROCEDURE — 93005 ELECTROCARDIOGRAM TRACING: CPT | Performed by: EMERGENCY MEDICINE

## 2020-08-09 PROCEDURE — 84484 ASSAY OF TROPONIN QUANT: CPT

## 2020-08-09 PROCEDURE — 93970 EXTREMITY STUDY: CPT

## 2020-08-09 PROCEDURE — 99285 EMERGENCY DEPT VISIT HI MDM: CPT

## 2020-08-09 RX ORDER — ASPIRIN 81 MG/1
324 TABLET, CHEWABLE ORAL ONCE
Status: COMPLETED | OUTPATIENT
Start: 2020-08-09 | End: 2020-08-09

## 2020-08-09 RX ORDER — MORPHINE SULFATE 4 MG/ML
4 INJECTION, SOLUTION INTRAMUSCULAR; INTRAVENOUS ONCE
Status: COMPLETED | OUTPATIENT
Start: 2020-08-09 | End: 2020-08-09

## 2020-08-09 RX ADMIN — IOHEXOL 85 ML: 350 INJECTION, SOLUTION INTRAVENOUS at 23:24

## 2020-08-09 RX ADMIN — ASPIRIN 324 MG: 81 TABLET ORAL at 20:52

## 2020-08-09 RX ADMIN — MORPHINE SULFATE 4 MG: 4 INJECTION, SOLUTION INTRAMUSCULAR; INTRAVENOUS at 20:52

## 2020-08-09 ASSESSMENT — PAIN DESCRIPTION - ORIENTATION: ORIENTATION: LEFT

## 2020-08-09 ASSESSMENT — PAIN DESCRIPTION - LOCATION: LOCATION: CHEST

## 2020-08-09 ASSESSMENT — PAIN DESCRIPTION - DESCRIPTORS: DESCRIPTORS: SHARP

## 2020-08-09 ASSESSMENT — PAIN SCALES - GENERAL
PAINLEVEL_OUTOF10: 8
PAINLEVEL_OUTOF10: 9

## 2020-08-09 NOTE — ED NOTES
Pt to ED with c/o chest pain, nausea, SOB and L sided numbness. Pt reports these symptoms have been going on \"for a couple weeks. \"  Pt also reports a cough that he has had for years. Pt reports he has been tested multiple times for COVID-19 and they have all come back negative. Pt reports a hx of HTN and takes lisinopril daily. Pt sitting on cot. RR even and non labored. Placed on full cardiac monitor. Call light within reach. Whiteboard updated. Pt denies any needs at this time. Visitor at bedside. Pt A&Ox4 and ambulatory upon arrival to ED. No signs of distress noted at this time. Will continue to monitor.        Lily Smith RN  08/09/20 [id number]

## 2020-08-10 ENCOUNTER — APPOINTMENT (OUTPATIENT)
Dept: NUCLEAR MEDICINE | Age: 60
End: 2020-08-10
Payer: MEDICAID

## 2020-08-10 ENCOUNTER — APPOINTMENT (OUTPATIENT)
Dept: GENERAL RADIOLOGY | Age: 60
End: 2020-08-10
Payer: MEDICAID

## 2020-08-10 VITALS
BODY MASS INDEX: 36.37 KG/M2 | SYSTOLIC BLOOD PRESSURE: 139 MMHG | OXYGEN SATURATION: 99 % | HEIGHT: 68 IN | RESPIRATION RATE: 16 BRPM | DIASTOLIC BLOOD PRESSURE: 78 MMHG | WEIGHT: 240 LBS | TEMPERATURE: 96.6 F | HEART RATE: 63 BPM

## 2020-08-10 PROBLEM — R07.9 CHEST PAIN: Status: ACTIVE | Noted: 2020-08-10

## 2020-08-10 LAB
LV EF: 51 %
LVEF MODALITY: NORMAL

## 2020-08-10 PROCEDURE — 93017 CV STRESS TEST TRACING ONLY: CPT

## 2020-08-10 PROCEDURE — 2580000003 HC RX 258: Performed by: INTERNAL MEDICINE

## 2020-08-10 PROCEDURE — A9500 TC99M SESTAMIBI: HCPCS | Performed by: INTERNAL MEDICINE

## 2020-08-10 PROCEDURE — 6370000000 HC RX 637 (ALT 250 FOR IP): Performed by: STUDENT IN AN ORGANIZED HEALTH CARE EDUCATION/TRAINING PROGRAM

## 2020-08-10 PROCEDURE — G0378 HOSPITAL OBSERVATION PER HR: HCPCS

## 2020-08-10 PROCEDURE — 93005 ELECTROCARDIOGRAM TRACING: CPT | Performed by: INTERNAL MEDICINE

## 2020-08-10 PROCEDURE — 6360000002 HC RX W HCPCS: Performed by: STUDENT IN AN ORGANIZED HEALTH CARE EDUCATION/TRAINING PROGRAM

## 2020-08-10 PROCEDURE — 96372 THER/PROPH/DIAG INJ SC/IM: CPT

## 2020-08-10 PROCEDURE — 3430000000 HC RX DIAGNOSTIC RADIOPHARMACEUTICAL: Performed by: INTERNAL MEDICINE

## 2020-08-10 PROCEDURE — 6360000002 HC RX W HCPCS: Performed by: INTERNAL MEDICINE

## 2020-08-10 PROCEDURE — 73590 X-RAY EXAM OF LOWER LEG: CPT

## 2020-08-10 PROCEDURE — 78452 HT MUSCLE IMAGE SPECT MULT: CPT

## 2020-08-10 PROCEDURE — 2580000003 HC RX 258: Performed by: STUDENT IN AN ORGANIZED HEALTH CARE EDUCATION/TRAINING PROGRAM

## 2020-08-10 RX ORDER — ASCORBIC ACID 500 MG
500 TABLET ORAL DAILY
Status: DISCONTINUED | OUTPATIENT
Start: 2020-08-10 | End: 2020-08-10 | Stop reason: HOSPADM

## 2020-08-10 RX ORDER — SODIUM CHLORIDE 0.9 % (FLUSH) 0.9 %
10 SYRINGE (ML) INJECTION PRN
Status: DISCONTINUED | OUTPATIENT
Start: 2020-08-10 | End: 2020-08-10 | Stop reason: ALTCHOICE

## 2020-08-10 RX ORDER — SODIUM CHLORIDE 0.9 % (FLUSH) 0.9 %
10 SYRINGE (ML) INJECTION PRN
Status: DISCONTINUED | OUTPATIENT
Start: 2020-08-10 | End: 2020-08-10 | Stop reason: HOSPADM

## 2020-08-10 RX ORDER — SODIUM CHLORIDE 0.9 % (FLUSH) 0.9 %
10 SYRINGE (ML) INJECTION EVERY 12 HOURS SCHEDULED
Status: DISCONTINUED | OUTPATIENT
Start: 2020-08-10 | End: 2020-08-10 | Stop reason: HOSPADM

## 2020-08-10 RX ORDER — METOPROLOL TARTRATE 5 MG/5ML
5 INJECTION INTRAVENOUS EVERY 5 MIN PRN
Status: DISCONTINUED | OUTPATIENT
Start: 2020-08-10 | End: 2020-08-10 | Stop reason: ALTCHOICE

## 2020-08-10 RX ORDER — ACETAMINOPHEN 325 MG/1
650 TABLET ORAL EVERY 4 HOURS PRN
Status: DISCONTINUED | OUTPATIENT
Start: 2020-08-10 | End: 2020-08-10 | Stop reason: HOSPADM

## 2020-08-10 RX ORDER — PREGABALIN 75 MG/1
75 CAPSULE ORAL 2 TIMES DAILY
Qty: 60 CAPSULE | Refills: 0 | Status: SHIPPED | OUTPATIENT
Start: 2020-08-10 | End: 2021-01-20

## 2020-08-10 RX ORDER — NITROGLYCERIN 0.4 MG/1
0.4 TABLET SUBLINGUAL EVERY 5 MIN PRN
Status: DISCONTINUED | OUTPATIENT
Start: 2020-08-10 | End: 2020-08-10 | Stop reason: ALTCHOICE

## 2020-08-10 RX ORDER — ATROPINE SULFATE 0.1 MG/ML
0.5 INJECTION INTRAVENOUS EVERY 5 MIN PRN
Status: DISCONTINUED | OUTPATIENT
Start: 2020-08-10 | End: 2020-08-10 | Stop reason: ALTCHOICE

## 2020-08-10 RX ORDER — M-VIT,TX,IRON,MINS/CALC/FOLIC 27MG-0.4MG
1 TABLET ORAL DAILY
Status: DISCONTINUED | OUTPATIENT
Start: 2020-08-10 | End: 2020-08-10 | Stop reason: HOSPADM

## 2020-08-10 RX ORDER — OXYBUTYNIN CHLORIDE 10 MG/1
10 TABLET, EXTENDED RELEASE ORAL DAILY
Status: DISCONTINUED | OUTPATIENT
Start: 2020-08-10 | End: 2020-08-10 | Stop reason: HOSPADM

## 2020-08-10 RX ORDER — PREGABALIN 75 MG/1
75 CAPSULE ORAL 2 TIMES DAILY
Qty: 60 CAPSULE | Refills: 0 | Status: SHIPPED | OUTPATIENT
Start: 2020-08-10 | End: 2020-08-10

## 2020-08-10 RX ORDER — LISINOPRIL AND HYDROCHLOROTHIAZIDE 20; 12.5 MG/1; MG/1
1 TABLET ORAL DAILY
Status: DISCONTINUED | OUTPATIENT
Start: 2020-08-10 | End: 2020-08-10 | Stop reason: HOSPADM

## 2020-08-10 RX ORDER — PREGABALIN 75 MG/1
75 CAPSULE ORAL 2 TIMES DAILY
Status: DISCONTINUED | OUTPATIENT
Start: 2020-08-10 | End: 2020-08-10 | Stop reason: HOSPADM

## 2020-08-10 RX ORDER — AMINOPHYLLINE DIHYDRATE 25 MG/ML
50 INJECTION, SOLUTION INTRAVENOUS PRN
Status: DISCONTINUED | OUTPATIENT
Start: 2020-08-10 | End: 2020-08-10 | Stop reason: ALTCHOICE

## 2020-08-10 RX ORDER — SODIUM CHLORIDE 9 MG/ML
500 INJECTION, SOLUTION INTRAVENOUS CONTINUOUS PRN
Status: DISCONTINUED | OUTPATIENT
Start: 2020-08-10 | End: 2020-08-10 | Stop reason: ALTCHOICE

## 2020-08-10 RX ADMIN — SODIUM CHLORIDE, PRESERVATIVE FREE 10 ML: 5 INJECTION INTRAVENOUS at 11:22

## 2020-08-10 RX ADMIN — OXYBUTYNIN CHLORIDE 10 MG: 10 TABLET, EXTENDED RELEASE ORAL at 09:46

## 2020-08-10 RX ADMIN — TETRAKIS(2-METHOXYISOBUTYLISOCYANIDE)COPPER(I) TETRAFLUOROBORATE 50 MILLICURIE: 1 INJECTION, POWDER, LYOPHILIZED, FOR SOLUTION INTRAVENOUS at 12:26

## 2020-08-10 RX ADMIN — LISINOPRIL AND HYDROCHLOROTHIAZIDE 1 TABLET: 12.5; 2 TABLET ORAL at 09:46

## 2020-08-10 RX ADMIN — ACETAMINOPHEN 650 MG: 325 TABLET ORAL at 07:00

## 2020-08-10 RX ADMIN — PREGABALIN 75 MG: 75 CAPSULE ORAL at 14:15

## 2020-08-10 RX ADMIN — ENOXAPARIN SODIUM 40 MG: 40 INJECTION SUBCUTANEOUS at 09:45

## 2020-08-10 RX ADMIN — SODIUM CHLORIDE, PRESERVATIVE FREE 10 ML: 5 INJECTION INTRAVENOUS at 09:46

## 2020-08-10 RX ADMIN — REGADENOSON 0.4 MG: 0.08 INJECTION, SOLUTION INTRAVENOUS at 11:20

## 2020-08-10 RX ADMIN — Medication 10 ML: at 11:01

## 2020-08-10 RX ADMIN — Medication 10 ML: at 11:20

## 2020-08-10 RX ADMIN — TETRAKIS(2-METHOXYISOBUTYLISOCYANIDE)COPPER(I) TETRAFLUOROBORATE 14 MILLICURIE: 1 INJECTION, POWDER, LYOPHILIZED, FOR SOLUTION INTRAVENOUS at 11:22

## 2020-08-10 RX ADMIN — SODIUM CHLORIDE, PRESERVATIVE FREE 10 ML: 5 INJECTION INTRAVENOUS at 12:26

## 2020-08-10 ASSESSMENT — ENCOUNTER SYMPTOMS
ABDOMINAL PAIN: 0
CHEST TIGHTNESS: 0
CONSTIPATION: 0
NAUSEA: 1
ABDOMINAL DISTENTION: 0
SHORTNESS OF BREATH: 1
COUGH: 0
SINUS PAIN: 0
DIARRHEA: 0
SINUS PRESSURE: 0
VOMITING: 0
WHEEZING: 0
EYE DISCHARGE: 0
TROUBLE SWALLOWING: 0
EYE REDNESS: 0
CHEST TIGHTNESS: 1
BACK PAIN: 0

## 2020-08-10 ASSESSMENT — PAIN SCALES - GENERAL: PAINLEVEL_OUTOF10: 9

## 2020-08-10 NOTE — ED NOTES
Pt laying on cot. RR even and non labored. No signs of distress noted at this time. VSS. Will continue to monitor.       Chandra Juares RN  08/10/20 [id number]

## 2020-08-10 NOTE — ED NOTES
Pt resting on stretcher, reports pain improvement s/p administration of tylenol. Pt provided toothbrush per request, denies any other needs. Pt remains on pulse ox and BP monitoring with alarms et.    David light within reach, will continue to monitor      Mabel Hartley RN  08/10/20 1989

## 2020-08-10 NOTE — ED PROVIDER NOTES
[facility]  ED  Emergency Department Encounter  Emergency Medicine Resident     Pt Name: Maria A Villarreal  MRN: [id number]  [place] 1960  Date of evaluation: 8/10/20  PCP:  Peggy Thakkar, [address]       Chief Complaint   Patient presents with    Chest Pain       HISTORY [place]  (Location/Symptom, Timing/Onset, Context/Setting, Quality, Duration, Modifying Factors,Severity.)      Maria A Villarreal is a 64 y.o. male who presents with left-sided chest pain with radiation to left arm. This been ongoing for the past week and a half. Suddenly worsened tonight. Is associated with some shortness of breath. Patient also complaining of bilateral lower leg swelling as well as some left lower leg tingling. Pain is moderate. Left-sided chest.  Associated with shortness of breath. Radiating down left arm. No past cardiac history that patient is aware of. No stents or CABG. Never seen a cardiologist.    Sims Kamraneazzi / SURGICAL / SOCIAL / FAMILY HISTORY      has a past medical history of Cancer ([address] Hematuria, Hypertension, Nerve injury, and DILAN (obstructive sleep apnea).     has a past surgical history that includes Appendectomy; Cystoscopy (N/A, 2/7/2019); lymph node dissection (Right, 2004); Septoplasty; Prostate biopsy (N/A, 3/21/2019); Prostatectomy (N/A, 5/31/2019); Cystoscopy (N/A, 5/28/2020); Circumcision (07/10/2020); and Circumcision (N/A, 7/10/2020).     Social History     Socioeconomic History    Marital status: Single     Spouse name: Not on file    Number of children: Not on file    Years of education: Not on file    Highest education level: Not on file   Occupational History    Not on file   Social Needs    Financial resource strain: Not on file    Food insecurity     Worry: Not on file     Inability: Not on file    Transportation needs     Medical: Not on file     Non-medical: Not on file   Tobacco Use    Smoking status: Never Smoker    Smokeless tobacco: Never Used   Substance and Sexual Activity    Alcohol use: No    Drug use: No    Sexual activity: Yes     Partners: Female     Birth control/protection: Condom   Lifestyle    Physical activity     Days per week: Not on file     Minutes per session: Not on file    Stress: Not on file   Relationships    Social connections     Talks on phone: Not on file     Gets together: Not on file     Attends Shinto service: Not on file     Active member of club or organization: Not on file     Attends meetings of clubs or organizations: Not on file     Relationship status: Not on file    Intimate partner violence     Fear of current or ex partner: Not on file     Emotionally abused: Not on file     Physically abused: Not on file     Forced sexual activity: Not on file   Other Topics Concern    Not on file   Social History Narrative    Not on file       Family History   Problem Relation Age of Onset    High Blood Pressure Mother     No Known Problems Father     Lupus Sister     High Blood Pressure Sister     No Known Problems Brother     No Known Problems Maternal Grandmother     No Known Problems Maternal Grandfather     No Known Problems Paternal Grandmother     No Known Problems Paternal Grandfather     No Known Problems Sister     No Known Problems Sister     No Known Problems Sister     No Known Problems Brother     No Known Problems Brother     No Known Problems Brother        Allergies:  Patient has no known allergies. Home Medications:  Prior to Admission medications    Medication Sig Start Date End Date Taking?  Authorizing Provider   vitamin C (ASCORBIC ACID) 500 MG tablet Take 500 mg by mouth daily    Historical Provider, MD   Multiple Vitamins-Minerals (THERAPEUTIC MULTIVITAMIN-MINERALS) tablet Take 1 tablet by mouth daily    Historical Provider, MD   oxybutynin (DITROPAN XL) 10 MG extended release tablet Take 1 tablet by mouth daily 5/28/20   Tiffany Hodge PA-C lisinopril-hydroCHLOROthiazide (PRINZIDE;ZESTORETIC) 20-12.5 MG per tablet Take 1 tablet by mouth daily    Historical Provider, MD   gabapentin (NEURONTIN) 100 MG capsule Take 1 capsule by mouth 2 times daily for 180 days. Intended supply: 90 days 4/24/20 10/21/20  Keily Ballard MD       REVIEW OF SYSTEMS    (2-9 systems for level 4, 10 or more for level 5)      Review of Systems   Constitutional: Negative for chills and fever. Eyes: Negative for discharge and redness. Respiratory: Positive for shortness of breath. Negative for chest tightness. Cardiovascular: Positive for chest pain. Gastrointestinal: Negative for abdominal pain. Genitourinary: Negative for dysuria and flank pain. Musculoskeletal: Negative for myalgias. Skin: Negative for rash. Allergic/Immunologic: Negative for environmental allergies. Neurological: Negative for headaches. Psychiatric/Behavioral: Negative for agitation and confusion. PHYSICAL EXAM   (up to 7 for level 4, 8 or more for level 5)     INITIAL VITALS:    height is 5' 8\" (1.727 m) and weight is 240 lb (108.9 kg). His oral temperature is 98.9 °F (37.2 °C). His blood pressure is 122/69 and his pulse is 60. His respiration is 18 and oxygen saturation is 94%. Physical Exam  Vitals signs and nursing note reviewed. Constitutional:       Appearance: He is well-developed. HENT:      Head: Normocephalic and atraumatic. Nose: Nose normal.      Mouth/Throat:      Mouth: Mucous membranes are moist.   Eyes:      General: No scleral icterus. Conjunctiva/sclera: Conjunctivae normal.      Pupils: Pupils are equal, round, and reactive to light. Neck:      Musculoskeletal: Neck supple. Trachea: No tracheal deviation. Cardiovascular:      Rate and Rhythm: Normal rate and regular rhythm. Heart sounds: Normal heart sounds. No murmur. No friction rub. No gallop. Pulmonary:      Effort: Pulmonary effort is normal. No respiratory distress.       Breath sounds: Normal breath sounds. No wheezing or rales. Abdominal:      General: Bowel sounds are normal. There is no distension. Palpations: Abdomen is soft. There is no mass. Tenderness: There is no abdominal tenderness. There is no guarding or rebound. Musculoskeletal: Normal range of motion. Right lower leg: No edema. Left lower leg: No edema. Skin:     General: Skin is warm and dry. Findings: No erythema or rash. Neurological:      Mental Status: He is alert and oriented to person, place, and time. Psychiatric:         Behavior: Behavior normal.         DIFFERENTIAL  DIAGNOSIS     PLAN (LABS / IMAGING / EKG):  Orders Placed This Encounter   Procedures    XR CHEST (2 VW)    CT CHEST PULMONARY EMBOLISM W CONTRAST    VL DUP LOWER EXTREMITY VENOUS BILATERAL    CBC Auto Differential    Basic Metabolic Panel w/ Reflex to MG    Troponin    Notify physician    Notify physician    Full Code    Inpatient consult to Cardiology    Initiate Oxygen Therapy Protocol    EKG 12 Lead    Saline lock IV    PATIENT STATUS (FROM ED OR OR/PROCEDURAL) Observation       MEDICATIONS ORDERED:  Orders Placed This Encounter   Medications    aspirin chewable tablet 324 mg    morphine injection 4 mg    iohexol (OMNIPAQUE 350) solution 85 mL    lisinopril-hydroCHLOROthiazide (PRINZIDE;ZESTORETIC) 20-12.5 MG per tablet 1 tablet    therapeutic multivitamin-minerals 1 tablet    oxybutynin (DITROPAN-XL) extended release tablet 10 mg    vitamin C (ASCORBIC ACID) tablet 500 mg    acetaminophen (TYLENOL) tablet 650 mg    enoxaparin (LOVENOX) injection 40 mg       DDX: ACS versus PE versus DVT versus COVID    Initial MDM/Plan: 64 y.o. male who presents with chest pain shortness of breath. Patient denies any infectious symptoms. No cardiac work-up in the past.  Will get basic cardiac labs, DVT study, PE study.   Anticipate admission to observation unit for cardiac consultation and possible stress test.    DIAGNOSTIC RESULTS / EMERGENCY DEPARTMENT COURSE / MDM     LABS:  Labs Reviewed   CBC WITH AUTO DIFFERENTIAL - Abnormal; Notable for the following components:       Result Value    Seg Neutrophils 66 (*)     All other components within normal limits   BASIC METABOLIC PANEL W/ REFLEX TO MG FOR LOW K - Abnormal; Notable for the following components:    Glucose 181 (*)     Potassium 3.6 (*)     All other components within normal limits   TROPONIN - Abnormal; Notable for the following components:    Troponin, High Sensitivity 23 (*)     All other components within normal limits   TROPONIN         RADIOLOGY:  Xr Chest (2 Vw)    Result Date: 8/9/2020  EXAMINATION: TWO XRAY VIEWS OF THE CHEST 8/9/2020 8:04 pm COMPARISON: November 27, 2018 HISTORY: ORDERING SYSTEM PROVIDED HISTORY: Chest Pain TECHNOLOGIST PROVIDED HISTORY: Chest Pain Reason for Exam: cp Acuity: Unknown Type of Exam: Unknown FINDINGS: The lungs are without acute focal process. There is no effusion or pneumothorax. The cardiomediastinal silhouette is without acute process. The osseous structures are without acute process. No acute process. Ct Chest Pulmonary Embolism W Contrast    Result Date: 8/9/2020  EXAMINATION: CTA OF THE CHEST 8/9/2020 10:46 pm TECHNIQUE: CTA of the chest was performed after the administration of intravenous contrast.  Multiplanar reformatted images are provided for review. MIP images are provided for review. Dose modulation, iterative reconstruction, and/or weight based adjustment of the mA/kV was utilized to reduce the radiation dose to as low as reasonably achievable. COMPARISON: Chest x-ray dated August 9, 2020 HISTORY: ORDERING SYSTEM PROVIDED HISTORY: r/o Pulmonary Embolism TECHNOLOGIST PROVIDED HISTORY: r/o Pulmonary Embolism Reason for Exam: Pt to ED with c/o chest pain, nausea, SOB and L sided numbness.  Acuity: Unknown Type of Exam: Unknown FINDINGS: Pulmonary Arteries: Pulmonary arteries are adequately opacified for evaluation. No evidence of intraluminal filling defect to suggest pulmonary embolism. Main pulmonary artery is normal in caliber. Mediastinum: No evidence of mediastinal lymphadenopathy. The heart and pericardium demonstrate no acute abnormality. There is no acute abnormality of the thoracic aorta. Lungs/pleura: Dependent changes are seen within the lung bases. No focal consolidation or pulmonary edema. No evidence of pleural effusion or pneumothorax. Upper Abdomen: Images through the upper abdomen demonstrate hepatic steatosis. Soft Tissues/Bones: No acute bone or soft tissue abnormality. No evidence of pulmonary embolism or acute pulmonary abnormality. Vl Dup Lower Extremity Venous Bilateral    Result Date: 8/9/2020    [facility]  Vascular Lower Extremities DVT Study Procedure   Patient Name  Brenda Zamudio      Date of Study           08/09/2020                Shawn Smith   Date of Birth 1960  Gender                  Male   Age           64 year(s)  Race                    Other   Room Number   34 port   Corporate ID  [id number]    Weight:                 240 pounds, 108.9 kg  #   Patient Val  [de-identified]  #   MR #          [id number]     Sonographer             Pavel Tucker [facility]   Accession #   [id number]  Interpreting Physician  Lilly Lindo   Referring                 Referring Physician     ER MD *  Nurse  Practitioner  Procedure Type of Study:   Veins: Lower Extremities DVT Study, Venous Scan Lower Bilateral.  Patient Status:ER. Conclusions   Summary   Simultaneous real time imaging utilizing B-Mode, color doppler and  spectral waveform analysis was performed on the bilateral lower  extremities for venous examination of the deep and superficial systems. Findings are:   Right:  No evidence of deep or superficial venous thrombosis. Left:  No evidence of deep or superficial venous thrombosis.    Signature   ---------------------------------------------------------------- Electronically signed by César Hawk RVT(Sonographer) on  08/09/2020 08:50 PM  ----------------------------------------------------------------   ----------------------------------------------------------------  Electronically signed by Suraj Simpson(Interpreting  physician) on 08/09/2020 10:16 PM  ----------------------------------------------------------------  Findings:   Right Impression:                    Left Impression:  The common femoral, femoral,         The common femoral, femoral,  popliteal and tibial veins           popliteal and tibial veins  demonstrate normal compressibility   demonstrate normal compressibility  and augmentation. and augmentation. Normal compressibility of the great  Normal compressibility of the great  saphenous vein. saphenous vein. Normal compressibility of the small  Normal compressibility of the small  saphenous vein. saphenous vein. Risk Factors   - The patient's risk factor(s) include: dyslipidemia and arterial     hypertension.   - The patient has prostate cancer. Velocities are measured in cm/s ; Diameters are measured in cm Right Lower Extremities DVT Study Measurements Right 2D Measurements +------------------------------------+----------+---------------+----------+ ! Location                            ! Visualized! Compressibility! Thrombosis! +------------------------------------+----------+---------------+----------+ ! Common Femoral                      !Yes       ! Yes            ! None      ! +------------------------------------+----------+---------------+----------+ ! Prox Femoral                        !Yes       ! Yes            ! None      ! +------------------------------------+----------+---------------+----------+ ! Mid Femoral                         !Yes       ! Yes            ! None      ! +------------------------------------+----------+---------------+----------+ ! Dist Femoral !Yes       !Yes            ! None      ! +------------------------------------+----------+---------------+----------+ ! Deep Femoral                        !Yes       ! Yes            ! None      ! +------------------------------------+----------+---------------+----------+ ! Popliteal                           !Yes       ! Yes            ! None      ! +------------------------------------+----------+---------------+----------+ ! Sapheno Femoral Junction            ! Yes       ! Yes            ! None      ! +------------------------------------+----------+---------------+----------+ ! PTV                                 ! Yes       ! Yes            ! None      ! +------------------------------------+----------+---------------+----------+ ! Peroneal                            !Yes       ! Yes            ! None      ! +------------------------------------+----------+---------------+----------+ ! Gastroc                             ! Yes       ! Yes            ! None      ! +------------------------------------+----------+---------------+----------+ ! GSV Thigh                           ! Yes       ! Yes            ! None      ! +------------------------------------+----------+---------------+----------+ ! GSV Knee                            ! Yes       ! Yes            ! None      ! +------------------------------------+----------+---------------+----------+ ! GSV Ankle                           ! Yes       ! Yes            ! None      ! +------------------------------------+----------+---------------+----------+ ! SSV                                 ! Yes       ! Yes            ! None      ! +------------------------------------+----------+---------------+----------+ Right Doppler Measurements +---------------------------+------+------+--------------------------------+ ! Location                   ! Signal!Reflux! Reflux (msec)                   ! +---------------------------+------+------+--------------------------------+ ! Common Femoral !Phasic!      !                                ! +---------------------------+------+------+--------------------------------+ ! Prox Femoral               !Phasic!      !                                ! +---------------------------+------+------+--------------------------------+ ! Popliteal                  !Phasic!      !                                ! +---------------------------+------+------+--------------------------------+ Left Lower Extremities DVT Study Measurements Left 2D Measurements +------------------------------------+----------+---------------+----------+ ! Location                            ! Visualized! Compressibility! Thrombosis! +------------------------------------+----------+---------------+----------+ ! Common Femoral                      !Yes       ! Yes            ! None      ! +------------------------------------+----------+---------------+----------+ ! Prox Femoral                        !Yes       ! Yes            ! None      ! +------------------------------------+----------+---------------+----------+ ! Mid Femoral                         !Yes       ! Yes            ! None      ! +------------------------------------+----------+---------------+----------+ ! Dist Femoral                        !Yes       ! Yes            ! None      ! +------------------------------------+----------+---------------+----------+ ! Deep Femoral                        !Yes       ! Yes            ! None      ! +------------------------------------+----------+---------------+----------+ ! Popliteal                           !Yes       ! Yes            ! None      ! +------------------------------------+----------+---------------+----------+ ! Sapheno Femoral Junction            ! Yes       ! Yes            ! None      ! +------------------------------------+----------+---------------+----------+ ! PTV                                 ! Yes       ! Yes            ! None      ! +------------------------------------+----------+---------------+----------+ ! Peroneal                            !Yes       ! Yes            ! None      ! +------------------------------------+----------+---------------+----------+ ! Gastroc                             ! Yes       ! Yes            ! None      ! +------------------------------------+----------+---------------+----------+ ! GSV Thigh                           ! Yes       ! Yes            ! None      ! +------------------------------------+----------+---------------+----------+ ! GSV Knee                            ! Yes       ! Yes            ! None      ! +------------------------------------+----------+---------------+----------+ ! GSV Ankle                           ! Yes       ! Yes            ! None      ! +------------------------------------+----------+---------------+----------+ ! SSV                                 ! Yes       ! Yes            ! None      ! +------------------------------------+----------+---------------+----------+ Left Doppler Measurements +---------------------------+------+------+--------------------------------+ ! Location                   ! Signal!Reflux! Reflux (msec)                   ! +---------------------------+------+------+--------------------------------+ ! Common Femoral             !Phasic!      !                                ! +---------------------------+------+------+--------------------------------+ ! Prox Femoral               !Phasic!      !                                ! +---------------------------+------+------+--------------------------------+ ! Popliteal                  !Phasic!      !                                ! +---------------------------+------+------+--------------------------------+        EMERGENCY DEPARTMENT COURSE:  ED Course as of Aug 10 0541   Russ Birdet Aug 09, 2020   2231 Troponin stable 23 and 21    [MS]   2231 No DVT    [MS]      ED Course User Index  [MS] Lizzie Solano,      DVT study negative.   Labs largely unremarkable stable troponins. PE study negative. Will admit to observation unit due to elevated heart score as well as no past cardiac history or work-up. Perfect serve sent to cardiac fellow. Patient be admitted to observation service. Received full dose aspirin. PROCEDURES:  None    CONSULTS:  IP CONSULT TO CARDIOLOGY    CRITICAL CARE:  Please see attending note    FINAL IMPRESSION      1.  Chest pain, unspecified type          DISPOSITION / PLAN     DISPOSITION Admitted 08/10/2020 12:16:40 AM        PATIENTREFERRED TO:  CARMELA Martin [address]  [phone]            DISCHARGE MEDICATIONS:  New Prescriptions    No medications on file       Hafsa Escobar DO  EmergencyMedicine Resident    (Please note that portions of this note were completed with a voice recognition program.  Efforts were made to edit the dictations but occasionally words are mis-transcribed.)       Hafsa Escobar DO  Resident  08/10/20 [id number]

## 2020-08-10 NOTE — ED NOTES
Pt called out requesting water. Pt reminded of NPO diet order. Pt verbalizes understanding. Will continue to monitor.       Ada Trujillo RN  08/10/20 [id number]

## 2020-08-10 NOTE — ED PROVIDER NOTES
FACULTY SIGN-OUT  ADDENDUM       Patient: Dylan Mancini   MRN: [id number]  PCP:  CARMELA Alfonso  Attestation  I was available and discussed any additional care issues that arose and coordinated the management plans with the resident(s) caring for the patient during my duty period. Any areas of disagreement with resident's documentation of care or procedures are noted on the chart. I was personally present for the key portions of any/all procedures during my duty period. I have documented in the chart those procedures where I was not present during the key portions. The patient's initial evaluation and plan have been discussed with the prior provider who initially evaluated the patient. Pertinent Comments: The patient is a 64 y.o. male taken in signout with chest pain with negative work-up including CT chest negative.     We are awaiting observation unit admission    ED COURSE      The patient was given the following medications:  Orders Placed This Encounter   Medications    aspirin chewable tablet 324 mg    morphine injection 4 mg    iohexol (OMNIPAQUE 350) solution 85 mL       RECENT VITALS:   BP: (!) 113/57  Pulse: 87  Resp: 19  Temp: 98.9 °F (37.2 °C) SpO2: 95 %    (Please note that portions of this note were completed with a voice recognition program.  Efforts were made to edit the dictations but occasionally words are mis-transcribed.)    MD Cele Hogan  Attending Emergency Medicine Physician       Navin Herrera MD  08/10/20 [id number]       Navin Herrera MD  08/10/20 [id number]

## 2020-08-10 NOTE — ED PROVIDER NOTES
FACULTY SIGN-OUT  ADDENDUM     Care of this patient was assumed from previous attending physician. The patient's initial evaluation and plan have been discussed with the prior provider who initially evaluated the patient. Attestation  I was available and discussed any additional care issues that arose and coordinated the management plans with the resident(s) caring for the patient during my duty period. Any areas of disagreement with resident's documentation of care or procedures are noted on the chart. I was personally present for the key portions of any/all procedures, during my duty period. I have documented in the chart those procedures where I was not present during the key portions. ED COURSE      The patient was given the following medications:  Orders Placed This Encounter   Medications    aspirin chewable tablet 324 mg    morphine injection 4 mg    iohexol (OMNIPAQUE 350) solution 85 mL       RECENT VITALS:   Temp: 98.9 °F (37.2 °C), Pulse: 76, Resp: 13, BP: 126/70    MEDICAL DECISION MAKING        Yumiko Curtis is a 64 y.o. male who presents to the Emergency Department with complaints of chest pain, sob. Await CT chest and plan to admit.       Kuldeep Ferraro MD  Attending Emergency Physician    (Please note that portions of this note were completed with a voice recognition program.  Efforts were made to edit the dictations but occasionally words are mis-transcribed.)          Kuldeep Ferraro MD  08/09/20 [id number]

## 2020-08-10 NOTE — ED NOTES
Troponin collected, labeled and sent to lab with yellow stickers. Pt denies any needs at this time. Will continue to monitor.         Fawad Nolan RN  08/09/20 [id number]

## 2020-08-10 NOTE — ED NOTES
Pt and visitor updated on plan of care. Pillow and blankets provided. Pt denies any other needs at this time. VSS. Will continue to monitor.       Navin Chapa RN  08/10/20 0030

## 2020-08-10 NOTE — PROGRESS NOTES
[facility]  CDU / OBSERVATION eNCOUnter  Attending NOte       I performed a history and physical examination of the patient and discussed management with the resident. I reviewed the residents note and agree with the documented findings and plan of care. Any areas of disagreement are noted on the chart. I was personally present for the key portions of any procedures. I have documented in the chart those procedures where I was not present during the key portions. I have reviewed the nurses notes. I agree with the chief complaint, past medical history, past surgical history, allergies, medications, social and family history as documented unless otherwise noted below. The Family history, social history, and ROS are effectively unchanged since admission unless noted elsewhere in the chart. Patient for stress testing today. Patient gives a story consistent with anginal type discomfort. Patient did have risk factors and there is reasonable concern for potential cardiac disease. Patient also was complaining of discomfort to his lower extremity on the left. Patient states this is been so for the last 10 months since he was postop from his prostate procedure. Patient states procedure was long and he was with pain on waking. Suspect neuropraxia. Patient has not had imaging done of her leg so will check for underlying occult type pathology but anticipate discharging on neuroleptic medication for pain. Appreciate cardiology evaluation. Will reevaluate after stress.   Okay for discharge if stress negative    Valeria Phan MD  Attending Emergency  Physician

## 2020-08-10 NOTE — ED NOTES
Pt resting on cot. RR even and non labored. No signs of distress noted at this time. Will continue to monitor.       Laith Vicente RN  08/10/20 [id number]

## 2020-08-10 NOTE — H&P
[facility]  CDU / OBSERVATION ENCOUNTER  RESIDENT NOTE     Pt Name: Makayla Maldonado  MRN: [id number]  [place] 1960  Date of evaluation: 8/10/20  Patient's PCP is :  CARMELA Briceno    CHIEF COMPLAINT       Chief Complaint   Patient presents with    Chest Pain         HISTORY OF PRESENT ILLNESS    Makayla Maldonado is a 64 y.o. male who presents the observation unit for cardiac evaluation. Patient reported to the emergency department for worsening chest pain. He reports that he has had left-sided chest pain for the past week and a half but it suddenly got worse last night. He also associates the chest pain with some shortness of breath. He admits to bilateral lower leg swelling. Patient denies any cardiac history. He has never seen a cardiologist in the past.  He does have a history of hypertension on lisinopril. Location/Symptom: Left-sided chest pain  Timing/Onset: Past 10 days  Provocation: None  Quality: Aching  Radiation: Into his left arm  Severity: 7 out of 10  Timing/Duration: Acutely worsened overnight  Modifying Factors: None    REVIEW OF SYSTEMS       Review of Systems   Constitutional: Positive for diaphoresis. Negative for activity change, appetite change, chills, fatigue and fever. HENT: Negative for congestion, ear pain, nosebleeds, sinus pressure, sinus pain and trouble swallowing. Respiratory: Positive for chest tightness and shortness of breath. Negative for cough and wheezing. Cardiovascular: Positive for chest pain and leg swelling. Negative for palpitations. Gastrointestinal: Positive for nausea. Negative for abdominal distention, abdominal pain, constipation, diarrhea and vomiting. Genitourinary: Negative for difficulty urinating, dysuria, flank pain, hematuria and urgency. Musculoskeletal: Positive for arthralgias. Negative for back pain, gait problem, neck pain and neck stiffness. Skin: Negative for pallor, rash and wound. Neurological: Positive for light-headedness. Negative for dizziness, syncope, weakness, numbness and headaches. Psychiatric/Behavioral: Negative for confusion, decreased concentration and sleep disturbance. (PQRS) Advance directives on face sheet per hospital policy. No change unless specifically mentioned in chart    PAST MEDICAL HISTORY    has a past medical history of Cancer ([facility] [address] Hematuria, Hypertension, Nerve injury, and DILAN (obstructive sleep apnea). I have reviewed the past medical history with the patient and it is pertinent to this complaint. SURGICAL HISTORY      has a past surgical history that includes Appendectomy; Cystoscopy (N/A, 2019); lymph node dissection (Right, ); Septoplasty; Prostate biopsy (N/A, 3/21/2019); Prostatectomy (N/A, 2019); Cystoscopy (N/A, 2020); Circumcision (07/10/2020); and Circumcision (N/A, 7/10/2020). I have reviewed and agree with Surgical History entered and it is pertinent to this complaint. CURRENT MEDICATIONS     lisinopril-hydroCHLOROthiazide (PRINZIDE;ZESTORETIC) 20-12.5 MG per tablet 1 tablet, Daily  therapeutic multivitamin-minerals 1 tablet, Daily  oxybutynin (DITROPAN-XL) extended release tablet 10 mg, Daily  vitamin C (ASCORBIC ACID) tablet 500 mg, Daily  acetaminophen (TYLENOL) tablet 650 mg, Q4H PRN  enoxaparin (LOVENOX) injection 40 mg, Daily        All medication charted and reviewed. ALLERGIES     has No Known Allergies. FAMILY HISTORY     He indicated that his mother is alive. He indicated that his father is . He indicated that all of his four sisters are alive. He indicated that all of his four brothers are alive. He indicated that his maternal grandmother is . He indicated that his maternal grandfather is . He indicated that his paternal grandmother is . He indicated that his paternal grandfather is . He indicated that all of his three daughters are alive.  He indicated that his son is alive. family history includes High Blood Pressure in his mother and sister; Lupus in his sister; No Known Problems in his brother, brother, brother, brother, father, maternal grandfather, maternal grandmother, paternal grandfather, paternal grandmother, sister, sister, and sister. The patient denies any pertinent family history. I have reviewed and agree with the family history entered. I have reviewed the Family History and it is not significant to the case    SOCIAL HISTORY      reports that he has never smoked. He has never used smokeless tobacco. He reports that he does not drink alcohol or use drugs. I have reviewed and agree with all Social.  There are no concerns for substance abuse/use. PHYSICAL EXAM     INITIAL VITALS:  height is 5' 8\" (1.727 m) and weight is 240 lb (108.9 kg). His oral temperature is 98.9 °F (37.2 °C). His blood pressure is 118/56 (abnormal) and his pulse is 62. His respiration is 18 and oxygen saturation is 96%. Physical Exam  Constitutional:       General: He is not in acute distress. Appearance: Normal appearance. He is obese. He is not ill-appearing or diaphoretic. HENT:      Head: Normocephalic and atraumatic. Nose: Nose normal. No congestion. Mouth/Throat:      Mouth: Mucous membranes are moist.      Pharynx: Oropharynx is clear. No posterior oropharyngeal erythema. Eyes:      General: No scleral icterus. Extraocular Movements: Extraocular movements intact. Conjunctiva/sclera: Conjunctivae normal.      Pupils: Pupils are equal, round, and reactive to light. Neck:      Musculoskeletal: Normal range of motion and neck supple. No muscular tenderness. Cardiovascular:      Rate and Rhythm: Normal rate and regular rhythm. Pulses: Normal pulses. Heart sounds: Normal heart sounds. No murmur. Pulmonary:      Effort: Pulmonary effort is normal. No respiratory distress.       Breath sounds: Normal breath sounds. Abdominal:      General: Abdomen is flat. Bowel sounds are normal. There is no distension. Palpations: Abdomen is soft. Tenderness: There is no abdominal tenderness. Musculoskeletal:      Right lower leg: No edema. Left lower leg: No edema. Skin:     General: Skin is warm and dry. Findings: No erythema or rash. Neurological:      General: No focal deficit present. Mental Status: He is alert and oriented to person, place, and time. DIFFERENTIAL DIAGNOSIS/MDM:     ACS versus STEMI versus PE versus unstable angina    DIAGNOSTIC RESULTS     EKG: All EKG's are interpreted by the Observation Physician who either signs or Co-signs this chart in the absence of a cardiologist.    EKG Interpretation      Interpreted by observation physician    Rhythm: normal sinus   Rate: normal  Axis: normal  Ectopy: none  Conduction: normal  ST Segments: no acute change  T Waves: no acute change  Q Waves: none    Clinical Impression: no acute changes    Renetta Henry DO        RADIOLOGY:   I directly visualized the following  images and reviewed the radiologist interpretations:    Xr Chest (2 Vw)    Result Date: 8/9/2020  EXAMINATION: TWO XRAY VIEWS OF THE CHEST 8/9/2020 8:04 pm COMPARISON: November 27, 2018 HISTORY: ORDERING SYSTEM PROVIDED HISTORY: Chest Pain TECHNOLOGIST PROVIDED HISTORY: Chest Pain Reason for Exam: cp Acuity: Unknown Type of Exam: Unknown FINDINGS: The lungs are without acute focal process. There is no effusion or pneumothorax. The cardiomediastinal silhouette is without acute process. The osseous structures are without acute process. No acute process. Ct Chest Pulmonary Embolism W Contrast    Result Date: 8/9/2020  EXAMINATION: CTA OF THE CHEST 8/9/2020 10:46 pm TECHNIQUE: CTA of the chest was performed after the administration of intravenous contrast.  Multiplanar reformatted images are provided for review. MIP images are provided for review.  Dose modulation, iterative reconstruction, and/or weight based adjustment of the mA/kV was utilized to reduce the radiation dose to as low as reasonably achievable. COMPARISON: Chest x-ray dated August 9, 2020 HISTORY: ORDERING SYSTEM PROVIDED HISTORY: r/o Pulmonary Embolism TECHNOLOGIST PROVIDED HISTORY: r/o Pulmonary Embolism Reason for Exam: Pt to ED with c/o chest pain, nausea, SOB and L sided numbness. Acuity: Unknown Type of Exam: Unknown FINDINGS: Pulmonary Arteries: Pulmonary arteries are adequately opacified for evaluation. No evidence of intraluminal filling defect to suggest pulmonary embolism. Main pulmonary artery is normal in caliber. Mediastinum: No evidence of mediastinal lymphadenopathy. The heart and pericardium demonstrate no acute abnormality. There is no acute abnormality of the thoracic aorta. Lungs/pleura: Dependent changes are seen within the lung bases. No focal consolidation or pulmonary edema. No evidence of pleural effusion or pneumothorax. Upper Abdomen: Images through the upper abdomen demonstrate hepatic steatosis. Soft Tissues/Bones: No acute bone or soft tissue abnormality. No evidence of pulmonary embolism or acute pulmonary abnormality.      Vl Dup Lower Extremity Venous Bilateral    Result Date: 8/9/2020    [facility]  Vascular Lower Extremities DVT Study Procedure   Patient Name  Quynh Byrd      Date of Study           08/09/2020                Shawn Smith   Date of Birth 1960  Gender                  Male   Age           64 year(s)  Race                    Other   Room Number   34 port   Corporate ID  [id number]    Weight:                 240 pounds, 108.9 kg  #   Patient [place]  [de-identified]  #   MR #          [id number]     Sonographer             Annie Wu JIAN   Accession #   [id number]  Interpreting Physician  Gilbert Cui   Referring                 Referring Physician     ER MD *  Nurse  Practitioner  Procedure Type of Study:   Veins: Lower Extremities DVT Study, Venous Scan Lower Bilateral.  Patient Status:ER. Conclusions   Summary   Simultaneous real time imaging utilizing B-Mode, color doppler and  spectral waveform analysis was performed on the bilateral lower  extremities for venous examination of the deep and superficial systems. Findings are:   Right:  No evidence of deep or superficial venous thrombosis. Left:  No evidence of deep or superficial venous thrombosis. Signature   ----------------------------------------------------------------  Electronically signed by Hallie Romero RVT(Sonographer) on  08/09/2020 08:50 PM  ----------------------------------------------------------------   ----------------------------------------------------------------  Electronically signed by Suraj Christiansen(Interpreting  physician) on 08/09/2020 10:16 PM  ----------------------------------------------------------------  Findings:   Right Impression:                    Left Impression:  The common femoral, femoral,         The common femoral, femoral,  popliteal and tibial veins           popliteal and tibial veins  demonstrate normal compressibility   demonstrate normal compressibility  and augmentation. and augmentation. Normal compressibility of the great  Normal compressibility of the great  saphenous vein. saphenous vein. Normal compressibility of the small  Normal compressibility of the small  saphenous vein. saphenous vein. Risk Factors   - The patient's risk factor(s) include: dyslipidemia and arterial     hypertension.   - The patient has prostate cancer. Velocities are measured in cm/s ; Diameters are measured in cm Right Lower Extremities DVT Study Measurements Right 2D Measurements +------------------------------------+----------+---------------+----------+ ! Location                            ! Visualized! Compressibility! Thrombosis! +------------------------------------+----------+---------------+----------+ ! Common Femoral                      !Yes       ! Yes            ! None      ! +------------------------------------+----------+---------------+----------+ ! Prox Femoral                        !Yes       ! Yes            ! None      ! +------------------------------------+----------+---------------+----------+ ! Mid Femoral                         !Yes       ! Yes            ! None      ! +------------------------------------+----------+---------------+----------+ ! Dist Femoral                        !Yes       ! Yes            ! None      ! +------------------------------------+----------+---------------+----------+ ! Deep Femoral                        !Yes       ! Yes            ! None      ! +------------------------------------+----------+---------------+----------+ ! Popliteal                           !Yes       ! Yes            ! None      ! +------------------------------------+----------+---------------+----------+ ! Sapheno Femoral Junction            ! Yes       ! Yes            ! None      ! +------------------------------------+----------+---------------+----------+ ! PTV                                 ! Yes       ! Yes            ! None      ! +------------------------------------+----------+---------------+----------+ ! Peroneal                            !Yes       ! Yes            ! None      ! +------------------------------------+----------+---------------+----------+ ! Gastroc                             ! Yes       ! Yes            ! None      ! +------------------------------------+----------+---------------+----------+ ! GSV Thigh                           ! Yes       ! Yes            ! None      ! +------------------------------------+----------+---------------+----------+ ! GSV Knee                            ! Yes       ! Yes            ! None      ! +------------------------------------+----------+---------------+----------+ ! GSV Ankle !Yes       !Yes            ! None      ! +------------------------------------+----------+---------------+----------+ ! SSV                                 ! Yes       ! Yes            ! None      ! +------------------------------------+----------+---------------+----------+ Right Doppler Measurements +---------------------------+------+------+--------------------------------+ ! Location                   ! Signal!Reflux! Reflux (msec)                   ! +---------------------------+------+------+--------------------------------+ ! Common Femoral             !Phasic!      !                                ! +---------------------------+------+------+--------------------------------+ ! Prox Femoral               !Phasic!      !                                ! +---------------------------+------+------+--------------------------------+ ! Popliteal                  !Phasic!      !                                ! +---------------------------+------+------+--------------------------------+ Left Lower Extremities DVT Study Measurements Left 2D Measurements +------------------------------------+----------+---------------+----------+ ! Location                            ! Visualized! Compressibility! Thrombosis! +------------------------------------+----------+---------------+----------+ ! Common Femoral                      !Yes       ! Yes            ! None      ! +------------------------------------+----------+---------------+----------+ ! Prox Femoral                        !Yes       ! Yes            ! None      ! +------------------------------------+----------+---------------+----------+ ! Mid Femoral                         !Yes       ! Yes            ! None      ! +------------------------------------+----------+---------------+----------+ ! Dist Femoral                        !Yes       ! Yes            ! None      ! +------------------------------------+----------+---------------+----------+ ! Deep Femoral !Yes       !Yes            ! None      ! +------------------------------------+----------+---------------+----------+ ! Popliteal                           !Yes       ! Yes            ! None      ! +------------------------------------+----------+---------------+----------+ ! Sapheno Femoral Junction            ! Yes       ! Yes            ! None      ! +------------------------------------+----------+---------------+----------+ ! PTV                                 ! Yes       ! Yes            ! None      ! +------------------------------------+----------+---------------+----------+ ! Peroneal                            !Yes       ! Yes            ! None      ! +------------------------------------+----------+---------------+----------+ ! Gastroc                             ! Yes       ! Yes            ! None      ! +------------------------------------+----------+---------------+----------+ ! GSV Thigh                           ! Yes       ! Yes            ! None      ! +------------------------------------+----------+---------------+----------+ ! GSV Knee                            ! Yes       ! Yes            ! None      ! +------------------------------------+----------+---------------+----------+ ! GSV Ankle                           ! Yes       ! Yes            ! None      ! +------------------------------------+----------+---------------+----------+ ! SSV                                 ! Yes       ! Yes            ! None      ! +------------------------------------+----------+---------------+----------+ Left Doppler Measurements +---------------------------+------+------+--------------------------------+ ! Location                   ! Signal!Reflux! Reflux (msec)                   ! +---------------------------+------+------+--------------------------------+ ! Common Femoral             !Phasic!      !                                ! +---------------------------+------+------+--------------------------------+ ! Prox Femoral !Phasic!      !                                ! +---------------------------+------+------+--------------------------------+ ! Popliteal                  !Phasic!      !                                ! +---------------------------+------+------+--------------------------------+      LABS:  I have reviewed and interpreted all available lab results.   Labs Reviewed   CBC WITH AUTO DIFFERENTIAL - Abnormal; Notable for the following components:       Result Value    Seg Neutrophils 66 (*)     All other components within normal limits   BASIC METABOLIC PANEL W/ REFLEX TO MG FOR LOW K - Abnormal; Notable for the following components:    Glucose 181 (*)     Potassium 3.6 (*)     All other components within normal limits   TROPONIN - Abnormal; Notable for the following components:    Troponin, High Sensitivity 23 (*)     All other components within normal limits   TROPONIN       SCREENING TOOLS:    HEART Risk Score for Chest Pain Patients   History and Physical Exam Suspicion Level  (Nausea, Vomiting, Diaphoresis, Radiation, Exertion)   Slightly Suspicious (0 pts)   Moderately Suspicious (1 pt)   Highly Suspicious (2 pts)   EKG Interpretation   Normal (0 pts)   Non-Specific Repolarization Disturbance (1 pt)   Significant ST-Depression (2 pts)   Age of Patient (in years)   = 39 (0 pts)   46-64 (1 pt)   = 65 (2 pts)   Risk Factors   No Risk Factors (0 pts)   1-2 Risk Factors (1 pt)   = 3 Risk Factors (2 pts)   Risk Factors Include:   Hypercholesterolemia   Hypertension   Diabetes Mellitus   Cigarette smoking   Positive family history   Obesity   CAD   (SLE, CKDz, HIV, Cocaine abuse)   Troponin Levels   = Normal Limit (0 pts)   1-3 Times Normal Limit (1 pt)   > 3 Times Normal Limit (2 pts)  TOTAL: 5    Percent Risk for Major Adverse Cardiac Event (MACE)  0-3 pts indicates low risk for MACE   2.5% (DISCHARGE)   4-7 pts indicates moderate risk for MACE  20.3% (OBS)  8-10 pts indicates high risk for MACE  72.7% (EARLY INVASIVE [address])    CDU NIRU / Elba Payne is a 64 y.o. male who presents with acute onset chest pain with dyspnea and leg swelling. Patient's cardiac work-up in the emergency department was negative. But due to his story and his troponin being elevated at 23 he was admitted to the observation unit to have a cardiac work-up. · Appreciate cardiology recommendations  · Continue home medications and pain control  · Monitor vitals, labs, and imaging  · DISPO: pending consults and clinical improvement    CONSULTS:    IP CONSULT TO CARDIOLOGY    PROCEDURES:  Not indicated       PATIENT REFERRED TO:    CARMELA [address]  [phone]            --  Deidre Espino DO   Emergency Medicine Resident     This dictation was generated by voice recognition computer software. Although all attempts are made to edit the dictation for accuracy, there may be errors in the transcription that are not intended.

## 2020-08-10 NOTE — PROGRESS NOTES
Pt discharged home pending meds to beds Lyrica script, educated on follow up appointments and new medications, pt verbalized understanding

## 2020-08-10 NOTE — ED NOTES
Pt ambulates with steady gait to and from bathroom. Will continue to monitor.       Izabela Sampson RN  08/10/20 2153

## 2020-08-10 NOTE — PROGRESS NOTES
Writer attempted to contact pts Primary Physician Mike Benton at [phone],  reports she is no longer at that location, writer called and left a message at [phone], voicemail left regarding pts admission.

## 2020-08-10 NOTE — ED PROVIDER NOTES
[facility]     Emergency Department     Faculty Attestation    I performed a history and physical examination of the patient and discussed management with the resident. I reviewed the residents note and agree with the documented findings including all diagnostic interpretations and plan of care. Any areas of disagreement are noted on the chart. I was personally present for the key portions of any procedures. I have documented in the chart those procedures where I was not present during the key portions. I have reviewed the emergency nurses triage note. I agree with the chief complaint, past medical history, past surgical history, allergies, medications, social and family history as documented unless otherwise noted below. Documentation of the HPI, Physical Exam and Medical Decision Making performed by scribes is based on my personal performance of the HPI, PE and MDM. For Physician Assistant/ Nurse Practitioner cases/documentation I have personally evaluated this patient and have completed at least one if not all key elements of the E/M (history, physical exam, and MDM). Additional findings are as noted. This patient was evaluated in the Emergency Department for symptoms described in the history of present illness. He/she was evaluated in the context of the global COVID-19 pandemic, which necessitated consideration that the patient might be at risk for infection with the SARS-CoV-2 virus that causes COVID-19. Institutional protocols and algorithms that pertain to the evaluation of patients at risk for COVID-19 are in a state of rapid change based on information released by regulatory bodies including the CDC and federal and state organizations. These policies and algorithms were followed during the patient's care in the ED. Primary Care Physician: CARMELA Crespo    History:  This is a 64 y.o. male who presents to the Emergency Department with complaint of chest pain, shortness of breath, left leg swelling. Intermittent. Feels like someone sitting on chest.  No fevers. No cough    Physical:     height is 5' 8\" (1.727 m) and weight is 240 lb (108.9 kg). His oral temperature is 98.9 °F (37.2 °C). His blood pressure is 126/70 and his pulse is 76. His respiration is 13 and oxygen saturation is 94%.    64 y.o. male no acute distress, mildly anxious appearing, cardiac exam regular rate and rhythm no murmurs rubs gallops, pulmonary clear bilaterally abdomen is soft nontender nondistended.   Calves show some tenderness to palpation to the left there is a palpable cord over the left lateral malleolus superior portion    Impression: Chest pain, concern for potential PE/DVT as well    Plan: Cardiac work-up, ultrasound, CT PE, likely observation admission at minimum    EKG Interpretation    Interpreted by me    Rhythm: normal sinus   Rate: normal  Axis: normal  Ectopy: none  Conduction: normal  ST Segments: no acute change  T Waves: no acute change  Q Waves: none    Clinical Impression: no acute changes and normal EKG      Rosa Wheatley MD, [facility]  Attending Emergency Physician         Sagrario Steen MD  08/09/20 230

## 2020-08-10 NOTE — PROGRESS NOTES
CLINICAL PHARMACY NOTE: MEDS TO [address] Select Patient?: No  Total # of Prescriptions Filled: 1   The following medications were delivered to the patient:  · Lyrica   Total # of Interventions Completed: 0  Time Spent (min): 0    Additional Documentation:

## 2020-08-10 NOTE — DISCHARGE SUMMARY
CDU Discharge Summary        Patient:  Shirley Reyna  YOB: 1960    MRN: [id number]   Acct: [de-identified]    Primary Care Physician: CARMELA Peterson    Admit date:  8/9/2020  7:27 PM  Discharge date: 8/10/2020    Discharge Diagnoses:     Acute left-sided chest pain and shortness of breath due to unstable angina  Improved with rest    Follow-up:  Call today/tomorrow for a follow up appointment with CARMELA Peterson , or return to the Emergency Room with worsening symptoms    Stressed to patient the importance of following up with primary care doctor for further workup/management of symptoms. Pt verbalizes understanding and agrees with plan. Discharge Medications:  Changes to medications-please stop taking your gabapentin. Begin taking Lyrica        John Paul Cooper   Home Medication Instructions NPA:[id number]    Printed on:08/10/20 1159   Medication Information                      gabapentin (NEURONTIN) 100 MG capsule  Take 1 capsule by mouth 2 times daily for 180 days.  Intended supply: 90 days             lisinopril-hydroCHLOROthiazide (PRINZIDE;ZESTORETIC) 20-12.5 MG per tablet  Take 1 tablet by mouth daily             Multiple Vitamins-Minerals (THERAPEUTIC MULTIVITAMIN-MINERALS) tablet  Take 1 tablet by mouth daily             oxybutynin (DITROPAN XL) 10 MG extended release tablet  Take 1 tablet by mouth daily             vitamin C (ASCORBIC ACID) 500 MG tablet  Take 500 mg by mouth daily                 Diet:  Diet NPO Effective Now  Diet NPO, After Midnight , Advance as tolerated     Activity:  As tolerated    Consultants: IP CONSULT TO CARDIOLOGY    Procedures: Lexiscan stress test    Diagnostic Test:   Results for orders placed or performed during the hospital encounter of 08/09/20   CBC Auto Differential   Result Value Ref Range    WBC 9.7 3.5 - 11.3 k/uL    RBC 5.19 4.21 - 5.77 m/uL    Hemoglobin 14.9 13.0 - 17.0 g/dL    Hematocrit 44.2 40.7 - 50.3 %    MCV 85.2 82.6 - November 27, 2018 HISTORY: ORDERING SYSTEM PROVIDED HISTORY: Chest Pain TECHNOLOGIST PROVIDED HISTORY: Chest Pain Reason for Exam: cp Acuity: Unknown Type of Exam: Unknown FINDINGS: The lungs are without acute focal process. There is no effusion or pneumothorax. The cardiomediastinal silhouette is without acute process. The osseous structures are without acute process. No acute process. Ct Chest Pulmonary Embolism W Contrast    Result Date: 8/9/2020  EXAMINATION: CTA OF THE CHEST 8/9/2020 10:46 pm TECHNIQUE: CTA of the chest was performed after the administration of intravenous contrast.  Multiplanar reformatted images are provided for review. MIP images are provided for review. Dose modulation, iterative reconstruction, and/or weight based adjustment of the mA/kV was utilized to reduce the radiation dose to as low as reasonably achievable. COMPARISON: Chest x-ray dated August 9, 2020 HISTORY: ORDERING SYSTEM PROVIDED HISTORY: r/o Pulmonary Embolism TECHNOLOGIST PROVIDED HISTORY: r/o Pulmonary Embolism Reason for Exam: Pt to ED with c/o chest pain, nausea, SOB and L sided numbness. Acuity: Unknown Type of Exam: Unknown FINDINGS: Pulmonary Arteries: Pulmonary arteries are adequately opacified for evaluation. No evidence of intraluminal filling defect to suggest pulmonary embolism. Main pulmonary artery is normal in caliber. Mediastinum: No evidence of mediastinal lymphadenopathy. The heart and pericardium demonstrate no acute abnormality. There is no acute abnormality of the thoracic aorta. Lungs/pleura: Dependent changes are seen within the lung bases. No focal consolidation or pulmonary edema. No evidence of pleural effusion or pneumothorax. Upper Abdomen: Images through the upper abdomen demonstrate hepatic steatosis. Soft Tissues/Bones: No acute bone or soft tissue abnormality. No evidence of pulmonary embolism or acute pulmonary abnormality.      Vl Dup Lower Extremity Venous Bilateral    Result Date: 8/9/2020    [facility]  Vascular Lower Extremities DVT Study Procedure   Patient Name  Bandar Watt      Date of Study           08/09/2020                Shawn Smith   Date of Birth 1960  Gender                  Male   Age           64 year(s)  Race                    Other   Room Number   34 port   Corporate ID  [id number]    Weight:                 240 pounds, 108.9 kg  #   Patient Val  [de-identified]  #   MR #          [id number]     Sonographer             Leonor Contreras RVT   Accession #   [id number]  Interpreting Physician  Giacomo Chavez   Referring                 Referring Physician     ER MD *  Nurse  Practitioner  Procedure Type of Study:   Veins: Lower Extremities DVT Study, Venous Scan Lower Bilateral.  Patient Status:ER. Conclusions   Summary   Simultaneous real time imaging utilizing B-Mode, color doppler and  spectral waveform analysis was performed on the bilateral lower  extremities for venous examination of the deep and superficial systems. Findings are:   Right:  No evidence of deep or superficial venous thrombosis. Left:  No evidence of deep or superficial venous thrombosis. Signature   ----------------------------------------------------------------  Electronically signed by Leonor Contreras RVT(Sonographer) on  08/09/2020 08:50 PM  ----------------------------------------------------------------   ----------------------------------------------------------------  Electronically signed by Suraj Huynh(Interpreting  physician) on 08/09/2020 10:16 PM  ----------------------------------------------------------------  Findings:   Right Impression:                    Left Impression:  The common femoral, femoral,         The common femoral, femoral,  popliteal and tibial veins           popliteal and tibial veins  demonstrate normal compressibility   demonstrate normal compressibility  and augmentation. and augmentation.    Normal compressibility of the great  Normal compressibility of the great  saphenous vein. saphenous vein. Normal compressibility of the small  Normal compressibility of the small  saphenous vein. saphenous vein. Risk Factors   - The patient's risk factor(s) include: dyslipidemia and arterial     hypertension.   - The patient has prostate cancer. Velocities are measured in cm/s ; Diameters are measured in cm Right Lower Extremities DVT Study Measurements Right 2D Measurements +------------------------------------+----------+---------------+----------+ ! Location                            ! Visualized! Compressibility! Thrombosis! +------------------------------------+----------+---------------+----------+ ! Common Femoral                      !Yes       ! Yes            ! None      ! +------------------------------------+----------+---------------+----------+ ! Prox Femoral                        !Yes       ! Yes            ! None      ! +------------------------------------+----------+---------------+----------+ ! Mid Femoral                         !Yes       ! Yes            ! None      ! +------------------------------------+----------+---------------+----------+ ! Dist Femoral                        !Yes       ! Yes            ! None      ! +------------------------------------+----------+---------------+----------+ ! Deep Femoral                        !Yes       ! Yes            ! None      ! +------------------------------------+----------+---------------+----------+ ! Popliteal                           !Yes       ! Yes            ! None      ! +------------------------------------+----------+---------------+----------+ ! Sapheno Femoral Junction            ! Yes       ! Yes            ! None      ! +------------------------------------+----------+---------------+----------+ ! PTV                                 ! Yes       ! Yes            ! None      ! +------------------------------------+----------+---------------+----------+ ! Peroneal                            !Yes       ! Yes            ! None      ! +------------------------------------+----------+---------------+----------+ ! Gastroc                             ! Yes       ! Yes            ! None      ! +------------------------------------+----------+---------------+----------+ ! GSV Thigh                           ! Yes       ! Yes            ! None      ! +------------------------------------+----------+---------------+----------+ ! GSV Knee                            ! Yes       ! Yes            ! None      ! +------------------------------------+----------+---------------+----------+ ! GSV Ankle                           ! Yes       ! Yes            ! None      ! +------------------------------------+----------+---------------+----------+ ! SSV                                 ! Yes       ! Yes            ! None      ! +------------------------------------+----------+---------------+----------+ Right Doppler Measurements +---------------------------+------+------+--------------------------------+ ! Location                   ! Signal!Reflux! Reflux (msec)                   ! +---------------------------+------+------+--------------------------------+ ! Common Femoral             !Phasic!      !                                ! +---------------------------+------+------+--------------------------------+ ! Prox Femoral               !Phasic!      !                                ! +---------------------------+------+------+--------------------------------+ ! Popliteal                  !Phasic!      !                                ! +---------------------------+------+------+--------------------------------+ Left Lower Extremities DVT Study Measurements Left 2D Measurements +------------------------------------+----------+---------------+----------+ ! Location                            ! Visualized! Compressibility! Thrombosis! +------------------------------------+----------+---------------+----------+ ! Common Femoral                      !Yes       ! Yes            ! None      ! +------------------------------------+----------+---------------+----------+ ! Prox Femoral                        !Yes       ! Yes            ! None      ! +------------------------------------+----------+---------------+----------+ ! Mid Femoral                         !Yes       ! Yes            ! None      ! +------------------------------------+----------+---------------+----------+ ! Dist Femoral                        !Yes       ! Yes            ! None      ! +------------------------------------+----------+---------------+----------+ ! Deep Femoral                        !Yes       ! Yes            ! None      ! +------------------------------------+----------+---------------+----------+ ! Popliteal                           !Yes       ! Yes            ! None      ! +------------------------------------+----------+---------------+----------+ ! Sapheno Femoral Junction            ! Yes       ! Yes            ! None      ! +------------------------------------+----------+---------------+----------+ ! PTV                                 ! Yes       ! Yes            ! None      ! +------------------------------------+----------+---------------+----------+ ! Peroneal                            !Yes       ! Yes            ! None      ! +------------------------------------+----------+---------------+----------+ ! Gastroc                             ! Yes       ! Yes            ! None      ! +------------------------------------+----------+---------------+----------+ ! GSV Thigh                           ! Yes       ! Yes            ! None      ! +------------------------------------+----------+---------------+----------+ ! GSV Knee                            ! Yes       ! Yes            ! None      ! +------------------------------------+----------+---------------+----------+ ! GSV Ankle !Yes       !Yes            ! None      ! +------------------------------------+----------+---------------+----------+ ! SSV                                 ! Yes       ! Yes            ! None      ! +------------------------------------+----------+---------------+----------+ Left Doppler Measurements +---------------------------+------+------+--------------------------------+ ! Location                   ! Signal!Reflux! Reflux (msec)                   ! +---------------------------+------+------+--------------------------------+ ! Common Femoral             !Phasic!      !                                ! +---------------------------+------+------+--------------------------------+ ! Prox Femoral               !Phasic!      !                                ! +---------------------------+------+------+--------------------------------+ ! Popliteal                  !Phasic!      !                                ! +---------------------------+------+------+--------------------------------+          Physical Exam:    General appearance - NAD, AOx 3   Lungs -CTAB, no R/R/R  Heart - RRR, no M/R/G  Abdomen - Soft, NT/ND  Neurological:  MAEx4, No focal motor deficit, sensory loss  Extremities - Cap refil <2 sec in all ext., no edema  Skin -warm, dry      Hospital Course:  Clinical course has improved, labs and imaging reviewed. Maria A Villarreal originally presented to the hospital on 8/9/2020  7:27 PM. with left-sided chest pain radiating into his arm associated with shortness of breath. At that time it was determined that He required further observation and evaluation by cardiology. Cardiology saw the patient and performed a Lexiscan stress test.  Patient also was admitting to left lower extremity pain for the past 10 months that began after his prostate surgery. Patient describes neuropathic pain. X-ray was performed of the left lower extremity. Patient was switched from gabapentin to Lyrica. . He was admitted and labs and imaging were followed daily. Imaging results as above. He is medically stable to be discharged. Disposition: Home    Patient stated that they will not drive themselves home from the hospital if they have gotten pain killers/ narcotics earlier that day and that they will arrange for transportation on their own or work with the  for a ride. Patient counseled NOT to drive while under the influence of narcotics/ pain killers. Condition: Good    Patient stable and ready for discharge home. I have discussed plan of care with patient and they are in understanding. They were instructed to read discharge paperwork. All of their questions and concerns were addressed. Time Spent: 1 day      --  Everett Encinas DO  Emergency Medicine Resident Physician    This dictation was generated by voice recognition computer software. Although all attempts are made to edit the dictation for accuracy, there may be errors in the transcription that are not intended.

## 2020-08-10 NOTE — ED NOTES
Pt sitting on cot. RR even and non labored. Visitor at bedside. No signs of distress noted at this time. Will continue to monitor.       Ada Trujillo RN  08/10/20 0010

## 2020-08-10 NOTE — CONSULTS
[facility]    Consult / H&P               Today's Date: 8/10/2020  Patient Name: Thomas Fenton  Date of admission: 8/9/2020  7:27 PM  Patient's age: 64 y.o., 1960  Admission Dx: Chest pain [R07.9]    Reason for Consult:  Cardiac evaluation    Requesting Physician: William Zabala MD    CHIEF COMPLAINT:  Chest Pain x 1.5 weeks. History Obtained From:  patient, spouse, electronic medical record    HISTORY OF PRESENT ILLNESS:      The patient is a 64 y.o.  male who is admitted to the hospital for chest pain. States chest pain onset 1.5 weeks ago and has been intermittent since however worsening yesterday at work. Describes his pain as a pressure like 10/10 left sided chest pain which did not radiate with associated dizziness, diaphoresis, shortness of breath, nausea and numbness and tingling in bilateral hands which lasted hours. 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 Pain did not worsen with exertion or improve with rest. Drove home from work and his systolic blood pressure was in the 230's per significant other. Denies fevers, chills, abdominal pain. In the ED EKG demonstrated NSR with no ST or T wave changes, Troponin 23 and 21. Past Medical History:   has a past medical history of Cancer ([address] Hematuria, Hypertension, Nerve injury, and DILAN (obstructive sleep apnea). Past Surgical History:   has a past surgical history that includes Appendectomy; Cystoscopy (N/A, 2/7/2019); lymph node dissection (Right, 2004); Septoplasty; Prostate biopsy (N/A, 3/21/2019); Prostatectomy (N/A, 5/31/2019); Cystoscopy (N/A, 5/28/2020); Circumcision (07/10/2020); and Circumcision (N/A, 7/10/2020). Home Medications:    Prior to Admission medications    Medication Sig Start Date End Date Taking?  Authorizing Provider   vitamin C (ASCORBIC ACID) 500 MG tablet Take 500 mg by mouth daily    Historical Provider, MD   Multiple Vitamins-Minerals (THERAPEUTIC MULTIVITAMIN-MINERALS) tablet Take 1 tablet by mouth daily    Historical Provider, MD   oxybutynin (DITROPAN XL) 10 MG extended release tablet Take 1 tablet by mouth daily 5/28/20   Arielle Baron PA-C   lisinopril-hydroCHLOROthiazide (PRINZIDE;ZESTORETIC) 20-12.5 MG per tablet Take 1 tablet by mouth daily    Historical Provider, MD   gabapentin (NEURONTIN) 100 MG capsule Take 1 capsule by mouth 2 times daily for 180 days. Intended supply: 90 days 4/24/20 10/21/20  Rony Morales MD      Current Facility-Administered Medications: lisinopril-hydroCHLOROthiazide (PRINZIDE;ZESTORETIC) 20-12.5 MG per tablet 1 tablet, 1 tablet, Oral, Daily  therapeutic multivitamin-minerals 1 tablet, 1 tablet, Oral, Daily  oxybutynin (DITROPAN-XL) extended release tablet 10 mg, 10 mg, Oral, Daily  vitamin C (ASCORBIC ACID) tablet 500 mg, 500 mg, Oral, Daily  sodium chloride flush 0.9 % injection 10 mL, 10 mL, Intravenous, 2 times per day  sodium chloride flush 0.9 % injection 10 mL, 10 mL, Intravenous, PRN  acetaminophen (TYLENOL) tablet 650 mg, 650 mg, Oral, Q4H PRN  enoxaparin (LOVENOX) injection 40 mg, 40 mg, Subcutaneous, Daily    Allergies:  Patient has no known allergies. Social History:   reports that he has never smoked. He has never used smokeless tobacco. He reports that he does not drink alcohol or use drugs. Family History: family history includes High Blood Pressure in his mother and sister; Lupus in his sister; No Known Problems in his brother, brother, brother, brother, father, maternal grandfather, maternal grandmother, paternal grandfather, paternal grandmother, sister, sister, and sister. No h/o sudden cardiac death. No for premature CAD    REVIEW OF SYSTEMS:    · Constitutional: there has been no unanticipated weight loss. There's been No change in energy level, No change in activity level. Diaphoresis. · Eyes: No visual changes or diplopia. No scleral icterus.   · ENT: No Headaches  · Cardiovascular: No cardiac history, chest pain. · Respiratory: Shortness of breath, No cough  · Gastrointestinal: Nausea. No abdominal pain. No change in bowel or bladder habits. · Genitourinary: No dysuria, trouble voiding, or hematuria. · Musculoskeletal:  No gait disturbance, No weakness or joint complaints. · Integumentary: No rash or pruritis. · Neurological: Numbness and tingling in bilateral hands. Dizziness. No headache, diplopia, change in muscle strength. No change in gait, balance, coordination, mood, affect, memory, mentation, behavior. · Psychiatric: No anxiety, or depression. · Endocrine: No temperature intolerance. No excessive thirst, fluid intake, or urination. No tremor. · Hematologic/Lymphatic: No abnormal bruising or bleeding, blood clots or swollen lymph nodes. · Allergic/Immunologic: No nasal congestion or hives. PHYSICAL EXAM:      /78   Pulse 63   Temp 96.6 °F (35.9 °C) (Oral)   Resp 16   Ht 5' 8\" (1.727 m)   Wt 240 lb (108.9 kg)   SpO2 99%   BMI 36.49 kg/m²    Constitutional and General Appearance: alert, cooperative, no distress and appears stated age  HEENT: PERRL, no cervical lymphadenopathy. No masses palpable. Normal oral mucosa  Respiratory:  · Normal excursion and expansion without use of accessory muscles  · Resp Auscultation: Good respiratory effort. No for increased work of breathing. On auscultation: clear to auscultation bilaterally  Cardiovascular:  · The apical impulse is not displaced  · Heart tones are crisp and normal. regular S1 and S2.  · Peripheral pulses are symmetrical and full   Abdomen:   · No masses or tenderness  · Bowel sounds present  Extremities:  ·  No Cyanosis or Clubbing  ·  Lower extremity edema: No  ·  Skin: Warm and dry  Neurological:  · Alert and oriented. · Moves all extremities well  · No abnormalities of mood, affect, memory, mentation, or behavior are noted    DATA:    Diagnostics:    EKG: normal EKG, normal sinus rhythm. ECHO: No previous.      Labs: CBC:   Recent Labs     08/09/20 1958   WBC 9.7   HGB 14.9   HCT 44.2        BMP:   Recent Labs     08/09/20 1958      K 3.6*   CO2 21   BUN 16   CREATININE 1.19   LABGLOM >60   GLUCOSE 181*     BNP: No results for input(s): BNP in the last 72 hours. PT/INR: No results for input(s): PROTIME, INR in the last 72 hours. APTT:No results for input(s): APTT in the last 72 hours. CARDIAC ENZYMES:No results for input(s): CKTOTAL, CKMB, CKMBINDEX, TROPONINI in the last 72 hours. FASTING LIPID PANEL:  Lab Results   Component Value Date    HDL 55 05/09/2020     LIVER PROFILE:No results for input(s): AST, ALT, LABALBU in the last 72 hours. IMPRESSION:    Patient Active Problem List   Diagnosis    Plantar wart    Dysuria    Prostate cancer ([facility] [address]    Chest pain     RECOMMENDATIONS:  1. ATypical Chest Pain. Troponin 23, 21. EKG with no ischemic changes. 2. Hypertension well controlled on Lisinopril - Hydrochlorothiazide. 3. Will get Lexiscan stress test.     Discussed with patient and family and Nurse. Electronically signed by Leigh Ann Rod DO on 8/10/2020 at 9:19 AM    [facility]      Attending Physician Statement  I have discussed the care of Yoko Peres, including pertinent history and exam findings,  with the cardiology fellow/resident. I have seen and examined the patient and the key elements of all parts of the encounter have been performed by me. I agree with the assessment, plan and orders as documented by the resident with additional recommendations as below:       Patient with no prior cardiac history, admitted with atypical chest discomfort, intermittent going on for last 2 weeks along with left leg pain, given coronary risk factors will proceed with pharmacological stress test and echocardiogram for further risk stratification. Thank you for allowing me to participate in the care of this patient, please do not hesitate to call with questions.

## 2020-08-10 NOTE — ED NOTES
Pt sleeping on cot. Chest rise and fall noted. RR even and non labored. No signs of distress noted at this time. Will continue to monitor.            Navin Chapa RN  08/10/20 0728

## 2020-08-10 NOTE — ED NOTES
Status unchanged. No needs voiced at this time. Will continue to monitor.       Jolynn Stout RN  08/10/20 [id number]

## 2020-08-11 LAB
EKG ATRIAL RATE: 58 BPM
EKG ATRIAL RATE: 94 BPM
EKG P AXIS: 32 DEGREES
EKG P AXIS: 8 DEGREES
EKG P-R INTERVAL: 164 MS
EKG P-R INTERVAL: 188 MS
EKG Q-T INTERVAL: 366 MS
EKG Q-T INTERVAL: 428 MS
EKG QRS DURATION: 74 MS
EKG QRS DURATION: 92 MS
EKG QTC CALCULATION (BAZETT): 420 MS
EKG QTC CALCULATION (BAZETT): 457 MS
EKG R AXIS: -18 DEGREES
EKG R AXIS: -26 DEGREES
EKG T AXIS: -13 DEGREES
EKG T AXIS: 18 DEGREES
EKG VENTRICULAR RATE: 58 BPM
EKG VENTRICULAR RATE: 94 BPM

## 2020-08-11 PROCEDURE — 93010 ELECTROCARDIOGRAM REPORT: CPT | Performed by: INTERNAL MEDICINE

## 2020-09-10 ENCOUNTER — PATIENT MESSAGE (OUTPATIENT)
Dept: UROLOGY | Age: 60
End: 2020-09-10

## 2020-09-10 NOTE — TELEPHONE ENCOUNTER
From: Dequan Haq  To:  Jose De Leon MD  Sent: 9/10/2020 12:00 PM EDT  Subject: Visit Follow-Up Question    Would like a follow-up on my PSA testing overdue       and have other questions

## 2020-09-14 RX ORDER — SILDENAFIL 100 MG/1
TABLET, FILM COATED ORAL
Qty: 30 TABLET | Refills: 0 | Status: SHIPPED | OUTPATIENT
Start: 2020-09-14 | End: 2021-03-05 | Stop reason: ALTCHOICE

## 2020-09-30 ENCOUNTER — HOSPITAL ENCOUNTER (OUTPATIENT)
Age: 60
Discharge: HOME | End: 2020-09-30
Payer: MEDICAID

## 2020-09-30 LAB — PROSTATE SPECIFIC ANTIGEN: <0.01 UG/L

## 2020-09-30 PROCEDURE — 36415 COLL VENOUS BLD VENIPUNCTURE: CPT

## 2020-09-30 PROCEDURE — 84153 ASSAY OF PSA TOTAL: CPT

## 2020-10-05 ENCOUNTER — APPOINTMENT (OUTPATIENT)
Dept: GENERAL RADIOLOGY | Age: 60
End: 2020-10-05
Payer: MEDICAID

## 2020-10-05 ENCOUNTER — HOSPITAL ENCOUNTER (EMERGENCY)
Age: 60
Discharge: HOME | End: 2020-10-05
Attending: EMERGENCY MEDICINE
Payer: MEDICAID

## 2020-10-05 VITALS
WEIGHT: 235 LBS | HEIGHT: 68 IN | RESPIRATION RATE: 16 BRPM | DIASTOLIC BLOOD PRESSURE: 96 MMHG | OXYGEN SATURATION: 99 % | TEMPERATURE: 98.1 F | HEART RATE: 84 BPM | BODY MASS INDEX: 35.61 KG/M2 | SYSTOLIC BLOOD PRESSURE: 157 MMHG

## 2020-10-05 PROCEDURE — 99283 EMERGENCY DEPT VISIT LOW MDM: CPT

## 2020-10-05 PROCEDURE — 6370000000 HC RX 637 (ALT 250 FOR IP): Performed by: STUDENT IN AN ORGANIZED HEALTH CARE EDUCATION/TRAINING PROGRAM

## 2020-10-05 PROCEDURE — 73502 X-RAY EXAM HIP UNI 2-3 VIEWS: CPT

## 2020-10-05 RX ORDER — LIDOCAINE 50 MG/G
1 PATCH TOPICAL DAILY
Qty: 10 PATCH | Refills: 0 | Status: SHIPPED | OUTPATIENT
Start: 2020-10-05 | End: 2020-10-15

## 2020-10-05 RX ORDER — ACETAMINOPHEN 500 MG
500 TABLET ORAL EVERY 6 HOURS PRN
Qty: 30 TABLET | Refills: 0 | Status: SHIPPED | OUTPATIENT
Start: 2020-10-05 | End: 2021-01-20

## 2020-10-05 RX ORDER — ACETAMINOPHEN 500 MG
1000 TABLET ORAL ONCE
Status: COMPLETED | OUTPATIENT
Start: 2020-10-05 | End: 2020-10-05

## 2020-10-05 RX ORDER — IBUPROFEN 600 MG/1
600 TABLET ORAL EVERY 6 HOURS PRN
Qty: 30 TABLET | Refills: 0 | Status: SHIPPED | OUTPATIENT
Start: 2020-10-05 | End: 2021-01-20

## 2020-10-05 RX ADMIN — ACETAMINOPHEN 1000 MG: 500 TABLET ORAL at 12:11

## 2020-10-05 ASSESSMENT — ENCOUNTER SYMPTOMS
SHORTNESS OF BREATH: 0
NAUSEA: 0
CONSTIPATION: 0
COUGH: 0
VOMITING: 0
BACK PAIN: 1
DIARRHEA: 0
ABDOMINAL PAIN: 0

## 2020-10-05 ASSESSMENT — PAIN DESCRIPTION - FREQUENCY: FREQUENCY: CONTINUOUS

## 2020-10-05 ASSESSMENT — PAIN DESCRIPTION - PAIN TYPE: TYPE: ACUTE PAIN

## 2020-10-05 ASSESSMENT — PAIN DESCRIPTION - ORIENTATION: ORIENTATION: LEFT

## 2020-10-05 ASSESSMENT — PAIN SCALES - GENERAL
PAINLEVEL_OUTOF10: 10
PAINLEVEL_OUTOF10: 10

## 2020-10-05 ASSESSMENT — PAIN DESCRIPTION - ONSET: ONSET: ON-GOING

## 2020-10-05 ASSESSMENT — PAIN DESCRIPTION - DESCRIPTORS: DESCRIPTORS: THROBBING;ACHING

## 2020-10-05 NOTE — ED PROVIDER NOTES
[facility]  ED  Emergency Department Encounter  EmergencyMedicine Resident     Pt Name:Ashly Wood  MRN: [id number]  [place] 1960  Date of evaluation: 10/5/20  PCP:  CARMELA Whipple    CHIEF COMPLAINT       Chief Complaint   Patient presents with    Hip Pain     left hip pain for 2-3 days       HISTORY OF PRESENT ILLNESS  (Location/Symptom, Timing/Onset, Context/Setting, Quality, Duration, Modifying Factors, Severity.)    This patient was evaluated in the Emergency Department for symptoms described in the history of present illness. He/she was evaluated in the context of the global COVID-19 pandemic, which necessitated consideration that the patient might be at risk for infection with the SARS-CoV-2 virus that causes COVID-19. Institutional protocols and algorithms that pertain to the evaluation of patients at risk for COVID-19 are in a state of rapid change based on information released by regulatory bodies including the CDC and federal and state organizations. These policies and algorithms were followed during the patient's care in the ED. Jeannette Chong is a 64 y.o. male who presents to the ED today with complaints of left hip pain and concern for dislocation. Patient states that he has had chronic left leg pain for over a year since complications after a prostatectomy surgery. Sensation and pain in leg has improved over the past year but he still has significant discomfort. Over the past 3 days he has had worsening left hip pain. Denies any recent traumatic injuries such as falls or MVC's. Also has some left paraspinal muscular pain. Denies any radiation from back into leg. Has chronic numbness and tingling in areas of his left lower extremity but overall numbness and tingling is at baseline with no new deficits. Denies any recent illness. Not on anticoagulation. Has been able to ambulate with cane but does have significant discomfort.   Pain is worse when trying to ambulate upstairs or stand up after sitting for prolonged period of time. No history of blood clots. Notes that only intermittently has left lower extremity swelling, none at this time. PAST MEDICAL / SURGICAL / SOCIAL / FAMILY HISTORY      has a past medical history of Cancer ([facility] [address] Hematuria, Hypertension, Nerve injury, and DILAN (obstructive sleep apnea).     has a past surgical history that includes Appendectomy; Cystoscopy (N/A, 2/7/2019); lymph node dissection (Right, 2004); Septoplasty; Prostate biopsy (N/A, 3/21/2019); Prostatectomy (N/A, 5/31/2019); Cystoscopy (N/A, 5/28/2020); Circumcision (07/10/2020); and Circumcision (N/A, 7/10/2020).     Social History     Socioeconomic History    Marital status: Single     Spouse name: Not on file    Number of children: Not on file    Years of education: Not on file    Highest education level: Not on file   Occupational History    Not on file   Social Needs    Financial resource strain: Not on file    Food insecurity     Worry: Not on file     Inability: Not on file    Transportation needs     Medical: Not on file     Non-medical: Not on file   Tobacco Use    Smoking status: Never Smoker    Smokeless tobacco: Never Used   Substance and Sexual Activity    Alcohol use: No    Drug use: No    Sexual activity: Yes     Partners: Female     Birth control/protection: Condom   Lifestyle    Physical activity     Days per week: Not on file     Minutes per session: Not on file    Stress: Not on file   Relationships    Social connections     Talks on phone: Not on file     Gets together: Not on file     Attends Gnosticist service: Not on file     Active member of club or organization: Not on file     Attends meetings of clubs or organizations: Not on file     Relationship status: Not on file    Intimate partner violence     Fear of current or ex partner: Not on file     Emotionally abused: Not on file     Physically abused: Not on file     Forced sexual activity: Not on file   Other Topics Concern    Not on file   Social History Narrative    Not on file       Family History   Problem Relation Age of Onset    High Blood Pressure Mother     No Known Problems Father     Lupus Sister     High Blood Pressure Sister     No Known Problems Brother     No Known Problems Maternal Grandmother     No Known Problems Maternal Grandfather     No Known Problems Paternal Grandmother     No Known Problems Paternal Grandfather     No Known Problems Sister     No Known Problems Sister     No Known Problems Sister     No Known Problems Brother     No Known Problems Brother     No Known Problems Brother        Allergies:  Patient has no known allergies. Home Medications:  Prior to Admission medications    Medication Sig Start Date End Date Taking? Authorizing Provider   acetaminophen (TYLENOL) 500 MG tablet Take 1 tablet by mouth every 6 hours as needed for Pain 10/5/20 11/4/20 Yes Sumaya Arce DO   ibuprofen (ADVIL;MOTRIN) 600 MG tablet Take 1 tablet by mouth every 6 hours as needed for Pain 10/5/20 11/4/20 Yes Sumaya Arce DO   lidocaine (LIDODERM) 5 % Place 1 patch onto the skin daily for 10 days 12 hours on, 12 hours off. 10/5/20 10/15/20 Yes Sumaya Arce DO   sildenafil (VIAGRA) 100 MG tablet Take one tablet by mouth daily as needed for Erectile Dysfunction 9/14/20   Violette Sultana MD   sildenafil (VIAGRA) 50 MG tablet Take one tablet by mouth daily as needed for Erectile Dysfunction 9/10/20   Violette Sultana MD   pregabalin (LYRICA) 75 MG capsule Take 1 capsule by mouth 2 times daily for 30 days.  8/10/20 9/9/20  Gemma Del Rosario DO   vitamin C (ASCORBIC ACID) 500 MG tablet Take 500 mg by mouth daily    Historical Provider, MD   Multiple Vitamins-Minerals (THERAPEUTIC MULTIVITAMIN-MINERALS) tablet Take 1 tablet by mouth daily    Historical Provider, MD   oxybutynin (DITROPAN XL) 10 MG extended release tablet Take 1 tablet by mouth daily 5/28/20 Arielle Baron PA-C   lisinopril-hydroCHLOROthiazide (PRINZIDE;ZESTORETIC) 20-12.5 MG per tablet Take 1 tablet by mouth daily    Historical Provider, MD       REVIEW OF SYSTEMS    (2-9 systems for level 4, 10 or more for level 5)      Review of Systems   Constitutional: Negative for chills and fever. Eyes: Negative for visual disturbance. Respiratory: Negative for cough and shortness of breath. Cardiovascular: Negative for chest pain. Gastrointestinal: Negative for abdominal pain, constipation, diarrhea, nausea and vomiting. Genitourinary: Positive for urgency (At baseline due to history of prostatectomy). Negative for dysuria and hematuria. Musculoskeletal: Positive for back pain (Left, lumbar, paraspinal). Left hip pain. Limited range of motion of left hip secondary to pain. Skin: Negative for rash. Neurological: Positive for numbness (Chronic numbness and tingling in left foot and areas of left leg, unchanged her baseline). Negative for dizziness and headaches. Hematological: Does not bruise/bleed easily. PHYSICAL EXAM   (up to 7 for level 4, 8 or more for level 5)      INITIAL VITALS:   BP (!) 157/96   Pulse 84   Temp 98.1 °F (36.7 °C) (Oral)   Resp 16   Ht 5' 8\" (1.727 m)   Wt 235 lb (106.6 kg)   SpO2 99%   BMI 35.73 kg/m²     Physical Exam  Constitutional:       General: He is not in acute distress. Appearance: He is well-developed. He is not ill-appearing or toxic-appearing. HENT:      Head: Normocephalic and atraumatic. Eyes:      Conjunctiva/sclera: Conjunctivae normal.   Neck:      Musculoskeletal: Normal range of motion. Trachea: No tracheal deviation. Cardiovascular:      Rate and Rhythm: Normal rate and regular rhythm. Heart sounds: Normal heart sounds. Pulmonary:      Effort: Pulmonary effort is normal. No respiratory distress. Breath sounds: Normal breath sounds. No wheezing or rales.    Abdominal:      General: Bowel sounds are emergency department, HPI and physical exam were conducted. All nursing notes were reviewed. 73-year-old male present emergency department with complaints of left hip pain progressive worsening over the past 3 days. No injury. Patient has been ambulatory but with significant discomfort. No saddle anesthesia. Has chronic paresthesias in left lower extremity after complicated prostate surgery last year but no new deficits. No episodes of incontinence. Patient was screened and has no clinical signs or symptoms of a CoVID-19 infection at this time. However, given current pandemic and atypical presentations, face mask, eye protection, surgical cap, and gloves were worn during examination. Patient was wearing surgical mask. Mild hypotension with the 7/96 vitals otherwise within normal limits. Heart regular rate rhythm without murmur. Lungs are clear to auscultate without wheezes rales rhonchi. Abdomen soft, nontender nondistended. Diffuse left hip pain and mild limited range of motion secondary to pain discomfort. Is able to flex hip nearly to 90 degrees but this does cause significant pain. There is no ecchymosis to left hip or evidence of traumatic injury. Lumbar spine with some mild paraspinal tenderness on the left side but no evidence of traumatic injury. No point tenderness or overlying erythema. Left lower extremity neurovascular intact with 2+ DP and PT pulses. Ambulatory with mild limp. Remainder of exam unremarkable. Likely arthritis in the setting of no traumatic injury. Will obtain x-ray to rule out occult fracture or dislocation. No concern for injury to lumbar spine or cauda equina syndrome. Will give Tylenol for pain. ED Course as of Oct 05 2116   Mon Oct 05, 2020   1325 X-ray does not show any evidence of fracture or dislocation. There is a mild amount of spurring noted. Patient updated on results.   Recommend follow-up with PCP for potential physical therapy versus additional imaging at a later time. Will recommend Tylenol, Motrin, lidocaine patch. [ZT]      ED Course User Index  [ZT] Ron Gorman DO     Discussed follow-up with PCP and possibly orthopedic team.  Patient is agreeable. He is able to ambulate with a cane and feels stable. With any pain management with Tylenol, Motrin, lidocaine patch. Plan to discuss possible rehab/physical therapy with PCP. All questions answered and return precautions provided. FINAL IMPRESSION      1.  Left hip pain          DISPOSITION / PLAN     DISPOSITION Decision To Discharge 10/05/2020 01:27:10 PM      PATIENT REFERRED TO:  CARMELA [address]  [phone]    Schedule an appointment as soon as possible for a visit       [address] [place] [address]  [phone]  Schedule an appointment as soon as possible for a visit         DISCHARGE MEDICATIONS:  Discharge Medication List as of 10/5/2020  1:51 PM      START taking these medications    Details   acetaminophen (TYLENOL) 500 MG tablet Take 1 tablet by mouth every 6 hours as needed for Pain, Disp-30 tablet,R-0Print      ibuprofen (ADVIL;MOTRIN) 600 MG tablet Take 1 tablet by mouth every 6 hours as needed for Pain, Disp-30 tablet,R-0Print      lidocaine (LIDODERM) 5 % Place 1 patch onto the skin daily for 10 days 12 hours on, 12 hours off., Disp-10 patch,R-0Print             Ron Gorman DO  Emergency Medicine Resident    (Please note that portions of thisnote were completed with a voice recognition program.  Efforts were made to edit the dictations but occasionally words are mis-transcribed.)       Ron Gorman DO  Resident  10/05/20 2127

## 2020-10-08 ENCOUNTER — HOSPITAL ENCOUNTER (OUTPATIENT)
Age: 60
Discharge: HOME | End: 2020-10-08
Payer: MEDICAID

## 2020-10-08 LAB
-: NORMAL
ABSOLUTE EOS #: 0.2 K/UL (ref 0–0.44)
ABSOLUTE IMMATURE GRANULOCYTE: <0.03 K/UL (ref 0–0.3)
ABSOLUTE LYMPH #: 1.75 K/UL (ref 1.1–3.7)
ABSOLUTE MONO #: 0.54 K/UL (ref 0.1–1.2)
ALBUMIN SERPL-MCNC: 4.3 G/DL (ref 3.5–5.2)
ALBUMIN/GLOBULIN RATIO: 1.4 (ref 1–2.5)
ALP BLD-CCNC: 61 U/L (ref 40–129)
ALT SERPL-CCNC: 30 U/L (ref 5–41)
AMORPHOUS: NORMAL
ANION GAP SERPL CALCULATED.3IONS-SCNC: 10 MMOL/L (ref 9–17)
AST SERPL-CCNC: 17 U/L
BACTERIA: NORMAL
BASOPHILS # BLD: 1 % (ref 0–2)
BASOPHILS ABSOLUTE: 0.04 K/UL (ref 0–0.2)
BILIRUB SERPL-MCNC: 1.32 MG/DL (ref 0.3–1.2)
BUN BLDV-MCNC: 13 MG/DL (ref 6–20)
BUN/CREAT BLD: ABNORMAL (ref 9–20)
CALCIUM SERPL-MCNC: 9 MG/DL (ref 8.6–10.4)
CASTS UA: NORMAL /LPF (ref 0–8)
CHLORIDE BLD-SCNC: 101 MMOL/L (ref 98–107)
CHOLESTEROL, FASTING: 193 MG/DL
CHOLESTEROL/HDL RATIO: 3.7
CO2: 27 MMOL/L (ref 20–31)
CREAT SERPL-MCNC: 0.96 MG/DL (ref 0.7–1.2)
CREATININE URINE: 225.1 MG/DL (ref 39–259)
CRYSTALS, UA: NORMAL /HPF
DIFFERENTIAL TYPE: ABNORMAL
EOSINOPHILS RELATIVE PERCENT: 2 % (ref 1–4)
EPITHELIAL CELLS UA: NORMAL /HPF (ref 0–5)
ESTIMATED AVERAGE GLUCOSE: 128 MG/DL
GFR AFRICAN AMERICAN: >60 ML/MIN
GFR NON-AFRICAN AMERICAN: >60 ML/MIN
GFR SERPL CREATININE-BSD FRML MDRD: ABNORMAL ML/MIN/{1.73_M2}
GFR SERPL CREATININE-BSD FRML MDRD: ABNORMAL ML/MIN/{1.73_M2}
GLUCOSE BLD-MCNC: 109 MG/DL (ref 70–99)
HBA1C MFR BLD: 6.1 % (ref 4–6)
HCT VFR BLD CALC: 45.5 % (ref 40.7–50.3)
HDLC SERPL-MCNC: 52 MG/DL
HEMOGLOBIN: 15.2 G/DL (ref 13–17)
HIV AG/AB: NONREACTIVE
IMMATURE GRANULOCYTES: 0 %
LDL CHOLESTEROL: 86 MG/DL (ref 0–130)
LYMPHOCYTES # BLD: 21 % (ref 24–43)
MCH RBC QN AUTO: 28.7 PG (ref 25.2–33.5)
MCHC RBC AUTO-ENTMCNC: 33.4 G/DL (ref 28.4–34.8)
MCV RBC AUTO: 85.8 FL (ref 82.6–102.9)
MICROALBUMIN/CREAT 24H UR: <12 MG/L
MICROALBUMIN/CREAT UR-RTO: NORMAL MCG/MG CREAT
MONOCYTES # BLD: 6 % (ref 3–12)
MUCUS: NORMAL
NRBC AUTOMATED: 0 PER 100 WBC
OTHER OBSERVATIONS UA: NORMAL
PDW BLD-RTO: 12.6 % (ref 11.8–14.4)
PLATELET # BLD: 219 K/UL (ref 138–453)
PLATELET ESTIMATE: ABNORMAL
PMV BLD AUTO: 11.3 FL (ref 8.1–13.5)
POTASSIUM SERPL-SCNC: 4 MMOL/L (ref 3.7–5.3)
PROSTATE SPECIFIC ANTIGEN: <0.01 UG/L
RBC # BLD: 5.3 M/UL (ref 4.21–5.77)
RBC # BLD: ABNORMAL 10*6/UL
RBC UA: NORMAL /HPF (ref 0–4)
RENAL EPITHELIAL, UA: NORMAL /HPF
SEG NEUTROPHILS: 70 % (ref 36–65)
SEGMENTED NEUTROPHILS ABSOLUTE COUNT: 5.97 K/UL (ref 1.5–8.1)
SODIUM BLD-SCNC: 138 MMOL/L (ref 135–144)
THYROXINE, FREE: 1 NG/DL (ref 0.93–1.7)
TOTAL PROTEIN: 7.4 G/DL (ref 6.4–8.3)
TRICHOMONAS: NORMAL
TRIGLYCERIDE, FASTING: 273 MG/DL
TSH SERPL DL<=0.05 MIU/L-ACNC: 3.35 MIU/L (ref 0.3–5)
VITAMIN B-12: 538 PG/ML (ref 232–1245)
VITAMIN D 25-HYDROXY: 20.3 NG/ML (ref 30–100)
VLDLC SERPL CALC-MCNC: ABNORMAL MG/DL (ref 1–30)
WBC # BLD: 8.5 K/UL (ref 3.5–11.3)
WBC # BLD: ABNORMAL 10*3/UL
WBC UA: NORMAL /HPF (ref 0–5)
YEAST: NORMAL

## 2020-10-08 PROCEDURE — 85025 COMPLETE CBC W/AUTO DIFF WBC: CPT

## 2020-10-08 PROCEDURE — 36415 COLL VENOUS BLD VENIPUNCTURE: CPT

## 2020-10-08 PROCEDURE — 80053 COMPREHEN METABOLIC PANEL: CPT

## 2020-10-08 PROCEDURE — 82306 VITAMIN D 25 HYDROXY: CPT

## 2020-10-08 PROCEDURE — 87086 URINE CULTURE/COLONY COUNT: CPT

## 2020-10-08 PROCEDURE — G0103 PSA SCREENING: HCPCS

## 2020-10-08 PROCEDURE — 84439 ASSAY OF FREE THYROXINE: CPT

## 2020-10-08 PROCEDURE — 81015 MICROSCOPIC EXAM OF URINE: CPT

## 2020-10-08 PROCEDURE — 86403 PARTICLE AGGLUT ANTBDY SCRN: CPT

## 2020-10-08 PROCEDURE — 82043 UR ALBUMIN QUANTITATIVE: CPT

## 2020-10-08 PROCEDURE — 82570 ASSAY OF URINE CREATININE: CPT

## 2020-10-08 PROCEDURE — 83036 HEMOGLOBIN GLYCOSYLATED A1C: CPT

## 2020-10-08 PROCEDURE — 84443 ASSAY THYROID STIM HORMONE: CPT

## 2020-10-08 PROCEDURE — 80061 LIPID PANEL: CPT

## 2020-10-08 PROCEDURE — 82607 VITAMIN B-12: CPT

## 2020-10-08 PROCEDURE — 87389 HIV-1 AG W/HIV-1&-2 AB AG IA: CPT

## 2020-10-09 LAB
CULTURE: ABNORMAL
Lab: ABNORMAL
SPECIMEN DESCRIPTION: ABNORMAL

## 2020-10-16 ENCOUNTER — OFFICE VISIT (OUTPATIENT)
Dept: UROLOGY | Age: 60
End: 2020-10-16
Payer: MEDICAID

## 2020-10-16 VITALS
DIASTOLIC BLOOD PRESSURE: 77 MMHG | BODY MASS INDEX: 36.22 KG/M2 | TEMPERATURE: 94.8 F | SYSTOLIC BLOOD PRESSURE: 131 MMHG | WEIGHT: 239 LBS | HEART RATE: 72 BPM | HEIGHT: 68 IN

## 2020-10-16 PROCEDURE — 3017F COLORECTAL CA SCREEN DOC REV: CPT | Performed by: UROLOGY

## 2020-10-16 PROCEDURE — G8417 CALC BMI ABV UP PARAM F/U: HCPCS | Performed by: UROLOGY

## 2020-10-16 PROCEDURE — G8484 FLU IMMUNIZE NO ADMIN: HCPCS | Performed by: UROLOGY

## 2020-10-16 PROCEDURE — 1036F TOBACCO NON-USER: CPT | Performed by: UROLOGY

## 2020-10-16 PROCEDURE — 99214 OFFICE O/P EST MOD 30 MIN: CPT | Performed by: UROLOGY

## 2020-10-16 PROCEDURE — G8427 DOCREV CUR MEDS BY ELIG CLIN: HCPCS | Performed by: UROLOGY

## 2020-10-16 PROCEDURE — 99212 OFFICE O/P EST SF 10 MIN: CPT | Performed by: UROLOGY

## 2020-10-16 RX ORDER — SILDENAFIL 100 MG/1
100 TABLET, FILM COATED ORAL DAILY PRN
Qty: 30 TABLET | Refills: 2 | Status: ON HOLD | OUTPATIENT
Start: 2020-10-16 | End: 2021-02-01

## 2020-10-17 NOTE — PROGRESS NOTES
Elbert Wood MD        [facility] PHYSICIANS  [facility] [address]  [address] [phone]  Dept: [phone]  Dept Fax: [phone] [facility] Urology Office Note -     Patient:  Sahara Reyes  YOB: 1960  Date: 10/17/2020    The patient is a 64 y.o. male who presents today for evaluation of the following problems:   Chief Complaint   Patient presents with    Follow-up    referred/consultation requested by CARMELA Ontiveros. HISTORY OF PRESENT ILLNESS:     Prostate cancer  Onset was  Months ago  Overall, the problem(s) are unchanged. Severity is described as severe  Associated Symptoms: No dysuria, no gross hematuria. Current Pain Severity: 2    Recent PSA undetectable      Secondary Diagnosis:      JUSTIN  Onset was  Months ago  Overall, the problem(s) are better. Severity is described as moderate    improving      ED  Onset was  Months ago  Overall, the problem(s) are unchanged. Severity is described as Severe    Would like sildenafil        Phimosis  Onset was  Years ago  Overall, the problem(s) are better. Severity is described as mild    S/p circ            Requested/reviewed records from CARMELA Ontiveros office and/or outside physician/EMR    (Patient's old records have been requested, reviewed and pertinent findings summarized in today's note.)    Last several PSA's:  Lab Results   Component Value Date    PSA <0.01 10/08/2020    PSA <0.01 09/30/2020    PSA <0.01 05/09/2020       Last total testosterone:  No results found for: TESTOSTERONE    Urinalysis today:  No results found for this visit on 10/16/20.     Last BUN and creatinine:  Lab Results   Component Value Date    BUN 13 10/08/2020     Lab Results   Component Value Date    CREATININE 0.96 10/08/2020         Imaging Reviewed during this Office Visit:   Elbert Wood MD independently reviewed the images and verified the radiology reports from:    Ct Sinus Wo Contrast    Result Date: 10/9/2019  EXAMINATION: CT OF THE SINUS WITHOUT CONTRAST  10/9/2019 4:02 pm TECHNIQUE: CT of the sinuses was performed without the administration of intravenous contrast. Multiplanar reformatted images are provided for review. Dose modulation, iterative reconstruction, and/or weight based adjustment of the mA/kV was utilized to reduce the radiation dose to as low as reasonably achievable. COMPARISON: None HISTORY: ORDERING SYSTEM PROVIDED HISTORY: Chronic maxillary sinusitis FINDINGS: SINUSES/MASTOIDS:  Limited coronal imaging of the paranasal sinuses demonstrates multifocal mucosal thickening. No definitive air-fluid levels are noted. Leftward deviation of the nasal septum is noted. SOFT TISSUES:  Soft tissues are markedly limited. Exam is presented in the coronal plane only and in the bone algorithm only.      Minimal mucosal thickening is noted No definitive air-fluid level is noted       PAST MEDICAL, FAMILY AND SOCIAL HISTORY:  Past Medical History:   Diagnosis Date    Cancer [facility]) 2019    prostate    Hematuria     Hypertension     Nerve injury     left leg , states  s/p prostatectomy    DILAN (obstructive sleep apnea)     DOES NOT HAVE MACHINE     Past Surgical History:   Procedure Laterality Date    APPENDECTOMY      CIRCUMCISION  07/10/2020    CIRCUMCISION N/A 7/10/2020    CIRCUMCISION performed by Agustina Menjivar MD at [address] [facility] N/A 2/7/2019    CYSTOSCOPY performed by Agustina Menjivar MD at [address] [facility] N/A 5/28/2020    CYSTOSCOPY performed by Agustina Menjivar MD at [address] [facility],[address] Right 2004    [address] [facility],[address] N/A 3/21/2019    PROSTATE BIOPSY, ULTRASOUND  (OFFICE TO CONTACT [place] performed by Agustina Menjivar MD at [facility] 5/31/2019    XI LAPAROSCOPIC ROBOTIC RADICAL PROSTATECTOMY, PELVIC LYMPH NODE DISSECTION performed by Agustina Menjivar MD at [address] [place] [address]       Family History   Problem Relation Age of Onset    High Blood Pressure Mother     No Known Problems Father     Lupus Sister     High Blood Pressure Sister     No Known Problems Brother     No Known Problems Maternal Grandmother     No Known Problems Maternal Grandfather     No Known Problems Paternal Grandmother     No Known Problems Paternal Grandfather     No Known Problems Sister     No Known Problems Sister     No Known Problems Sister     No Known Problems Brother     No Known Problems Brother     No Known Problems Brother      Outpatient Medications Marked as Taking for the 10/16/20 encounter (Office Visit) with Jonas Aguilar MD   Medication Sig Dispense Refill    sildenafil (VIAGRA) 100 MG tablet Take 1 tablet by mouth daily as needed for Erectile Dysfunction 30 tablet 2    oxybutynin (DITROPAN XL) 10 MG extended release tablet Take 1 tablet by mouth daily 30 tablet 3    lisinopril-hydroCHLOROthiazide (PRINZIDE;ZESTORETIC) 20-12.5 MG per tablet Take 1 tablet by mouth daily         Patient has no known allergies. Social History     Tobacco Use   Smoking Status Never Smoker   Smokeless Tobacco Never Used      (If patient a smoker, smoking cessation counseling offered)   Social History     Substance and Sexual Activity   Alcohol Use No       REVIEW OF SYSTEMS:  Constitutional: negative  Eyes: negative  Respiratory: negative  Cardiovascular: negative  Gastrointestinal: negative  Genitourinary: see HPI  Musculoskeletal: negative  Skin: negative   Neurological: negative  Hematological/Lymphatic: negative  Psychological: negative      Physical Exam:    This a 64 y.o. male  Vitals:    10/16/20 0957   BP: 131/77   Pulse: 72   Temp:      Body mass index is 36.34 kg/m². Constitutional: Patient in no acute distress;       Assessment and Plan        1. Prostate cancer ([address]    2. Erectile dysfunction, unspecified erectile dysfunction type    3. Stress incontinence    4. Phimosis    5.  Urge incontinence of urine               Plan:       neuropathic pain after surgery has improved (leg pain/numbness)  Doing well after circumcision- is complaining of weight gain and buried penis.  Recommend weight loss  Voiding w minimal incontinence  Recent PSA undetectable  ED- sildenafil given to patient    Prescriptions Ordered:  Orders Placed This Encounter   Medications    sildenafil (VIAGRA) 100 MG tablet     Sig: Take 1 tablet by mouth daily as needed for Erectile Dysfunction     Dispense:  30 tablet     Refill:  2      Orders Placed:  Orders Placed This Encounter   Procedures    PSA, Diagnostic     Standing Status:   Future     Standing Expiration Date:   10/16/2021            Jessy Freed MD

## 2021-01-13 ENCOUNTER — TELEPHONE (OUTPATIENT)
Dept: GASTROENTEROLOGY | Age: 61
End: 2021-01-13

## 2021-01-13 DIAGNOSIS — Z12.11 SCREEN FOR COLON CANCER: Primary | ICD-10-CM

## 2021-01-13 RX ORDER — POLYETHYLENE GLYCOL 3350 17 G/17G
POWDER, FOR SOLUTION ORAL
Qty: 238 G | Refills: 0 | Status: SHIPPED | OUTPATIENT
Start: 2021-01-13 | End: 2021-03-05 | Stop reason: ALTCHOICE

## 2021-01-13 RX ORDER — BISACODYL 5 MG
TABLET, DELAYED RELEASE (ENTERIC COATED) ORAL
Qty: 2 TABLET | Refills: 0 | Status: SHIPPED | OUTPATIENT
Start: 2021-01-13 | End: 2021-03-05 | Stop reason: ALTCHOICE

## 2021-01-19 ENCOUNTER — TELEPHONE (OUTPATIENT)
Dept: GASTROENTEROLOGY | Age: 61
End: 2021-01-19

## 2021-01-19 NOTE — TELEPHONE ENCOUNTER
Office received call from [address] [place] [address] team - To Soto. She was requesting clinical information to why the colonoscopy was ordered. Writer reviewed chart and explained that our office received a referral from [facility] for a screening colon. Writer advised Kiki Go to call that office to get further information.

## 2021-01-20 ENCOUNTER — HOSPITAL ENCOUNTER (OUTPATIENT)
Dept: PREADMISSION TESTING | Age: 61
Discharge: HOME | End: 2021-01-24
Payer: MEDICAID

## 2021-01-20 VITALS
WEIGHT: 245 LBS | BODY MASS INDEX: 37.13 KG/M2 | RESPIRATION RATE: 16 BRPM | OXYGEN SATURATION: 98 % | DIASTOLIC BLOOD PRESSURE: 74 MMHG | TEMPERATURE: 97.4 F | HEIGHT: 68 IN | SYSTOLIC BLOOD PRESSURE: 150 MMHG | HEART RATE: 76 BPM

## 2021-01-20 LAB
ABSOLUTE EOS #: 0.3 K/UL (ref 0–0.4)
ABSOLUTE IMMATURE GRANULOCYTE: ABNORMAL K/UL (ref 0–0.3)
ABSOLUTE LYMPH #: 1.7 K/UL (ref 1–4.8)
ABSOLUTE MONO #: 0.7 K/UL (ref 0.1–1.3)
ANION GAP SERPL CALCULATED.3IONS-SCNC: 9 MMOL/L (ref 9–17)
BASOPHILS # BLD: 1 % (ref 0–2)
BASOPHILS ABSOLUTE: 0 K/UL (ref 0–0.2)
BUN BLDV-MCNC: 16 MG/DL (ref 6–20)
BUN/CREAT BLD: ABNORMAL (ref 9–20)
CALCIUM SERPL-MCNC: 9.4 MG/DL (ref 8.6–10.4)
CHLORIDE BLD-SCNC: 98 MMOL/L (ref 98–107)
CO2: 27 MMOL/L (ref 20–31)
CREAT SERPL-MCNC: 0.79 MG/DL (ref 0.7–1.2)
DIFFERENTIAL TYPE: ABNORMAL
EOSINOPHILS RELATIVE PERCENT: 4 % (ref 0–4)
GFR AFRICAN AMERICAN: >60 ML/MIN
GFR NON-AFRICAN AMERICAN: >60 ML/MIN
GFR SERPL CREATININE-BSD FRML MDRD: ABNORMAL ML/MIN/{1.73_M2}
GFR SERPL CREATININE-BSD FRML MDRD: ABNORMAL ML/MIN/{1.73_M2}
GLUCOSE BLD-MCNC: 204 MG/DL (ref 70–99)
HCT VFR BLD CALC: 43.4 % (ref 41–53)
HEMOGLOBIN: 14.7 G/DL (ref 13.5–17.5)
IMMATURE GRANULOCYTES: ABNORMAL %
LYMPHOCYTES # BLD: 25 % (ref 24–44)
MCH RBC QN AUTO: 29.1 PG (ref 26–34)
MCHC RBC AUTO-ENTMCNC: 33.8 G/DL (ref 31–37)
MCV RBC AUTO: 86.1 FL (ref 80–100)
MONOCYTES # BLD: 10 % (ref 1–7)
NRBC AUTOMATED: ABNORMAL PER 100 WBC
PDW BLD-RTO: 13.1 % (ref 11.5–14.9)
PLATELET # BLD: 211 K/UL (ref 150–450)
PLATELET ESTIMATE: ABNORMAL
PMV BLD AUTO: 9.5 FL (ref 6–12)
POTASSIUM SERPL-SCNC: 4 MMOL/L (ref 3.7–5.3)
RBC # BLD: 5.04 M/UL (ref 4.5–5.9)
RBC # BLD: ABNORMAL 10*6/UL
SEG NEUTROPHILS: 60 % (ref 36–66)
SEGMENTED NEUTROPHILS ABSOLUTE COUNT: 4.1 K/UL (ref 1.3–9.1)
SODIUM BLD-SCNC: 134 MMOL/L (ref 135–144)
WBC # BLD: 6.7 K/UL (ref 3.5–11)
WBC # BLD: ABNORMAL 10*3/UL

## 2021-01-20 PROCEDURE — 36415 COLL VENOUS BLD VENIPUNCTURE: CPT

## 2021-01-20 PROCEDURE — 80048 BASIC METABOLIC PNL TOTAL CA: CPT

## 2021-01-20 PROCEDURE — 85025 COMPLETE CBC W/AUTO DIFF WBC: CPT

## 2021-01-20 ASSESSMENT — PAIN DESCRIPTION - PROGRESSION: CLINICAL_PROGRESSION: NOT CHANGED

## 2021-01-20 ASSESSMENT — PAIN SCALES - GENERAL: PAINLEVEL_OUTOF10: 7

## 2021-01-20 ASSESSMENT — PAIN DESCRIPTION - LOCATION: LOCATION: LEG

## 2021-01-20 ASSESSMENT — PAIN DESCRIPTION - DESCRIPTORS: DESCRIPTORS: ACHING;TINGLING;SHOOTING

## 2021-01-20 NOTE — H&P
HISTORY and [address]       NAME:  Mukul Wolf  MRN: [id number]   YOB: 1960   Date: 1/20/2021   Age: 64 y.o. Gender: male       COMPLAINT AND PRESENT HISTORY:     Mukul Wolf is 64 y.o.,   male, having a Screening Colonoscopy. This is patient's first Colonoscopy. Patient denies any  FH of Colon. Patient reports no changes in bowel habits, No diarrhea or constipation. No GI /Rectal bleeding, no experiencing red/ black/ BRBPR stools. Patient has a history of  cramping/aching bilateral lower abdominal pain started two weeks ago , pt rates his pain 6/10.pain is pain is relieved by bowel movement. Pt has no hx of GERD, No heartburn or dysphagia. Pt denies fever/chills, chest pain , SOB, no hx of infection MRSA, no problem with anesthesia.    Pt is not on any kind of anti coagulant medication     PMH  Hypertension pt on lisinopril-hydrochlorothiazide 12-12.5 mg dialy  Sleep Apnea pt is not using CPAP  machine       PAST MEDICAL HISTORY     Past Medical History:   Diagnosis Date    Cancer [facility]) 2019    prostate    Hematuria     Hypertension     Nerve injury     left leg , states  s/p prostatectomy    DILAN (obstructive sleep apnea)     DOES NOT HAVE MACHINE       SURGICAL HISTORY       Past Surgical History:   Procedure Laterality Date    APPENDECTOMY      CIRCUMCISION N/A 7/10/2020    CIRCUMCISION performed by Goyo Quintana MD at [address] [facility] N/A 2/7/2019    CYSTOSCOPY performed by Goyo Quintana MD at [facility] N/A 5/28/2020    CYSTOSCOPY performed by Goyo Quintana MD at [address] [facility] Right 2004    [address] [facility] 3/[facility]/2019    PROSTATE BIOPSY, ULTRASOUND  (OFFICE TO CONTACT [place] performed by Goyo Quintana MD at [facility] 5/31/2019    XI [address], PELVIC LYMPH NODE DISSECTION performed by Goyo Quintana MD at [address] [facility] Family History   Problem Relation Age of Onset    High Blood Pressure Mother     No Known Problems Father     Lupus Sister     High Blood Pressure Sister     No Known Problems Brother     No Known Problems Maternal Grandmother     No Known Problems Maternal Grandfather     No Known Problems Paternal Grandmother     No Known Problems Paternal Grandfather     No Known Problems Sister     No Known Problems Sister     No Known Problems Sister     No Known Problems Brother     No Known Problems Brother     No Known Problems Brother        SOCIAL HISTORY       Social History     Socioeconomic History    Marital status: Single     Spouse name: None    Number of children: None    Years of education: None    Highest education level: None   Occupational History    None   Social Needs    Financial resource strain: None    Food insecurity     Worry: None     Inability: None    Transportation needs     Medical: None     Non-medical: None   Tobacco Use    Smoking status: Never Smoker    Smokeless tobacco: Never Used   Substance and Sexual Activity    Alcohol use: No    Drug use: No    Sexual activity: Yes     Partners: Female     Birth control/protection: Condom   Lifestyle    Physical activity     Days per week: None     Minutes per session: None    Stress: None   Relationships    Social connections     Talks on phone: None     Gets together: None     Attends [employer] service: None     Active member of club or organization: None     Attends meetings of clubs or organizations: None     Relationship status: None    Intimate partner violence     Fear of current or ex partner: None     Emotionally abused: None     Physically abused: None     Forced sexual activity: None   Other Topics Concern    None   Social History Narrative    None     REVIEW OF SYSTEMS      No Known Allergies    Current Outpatient Medications on File Prior to Encounter   Medication Sig Dispense Refill    vitamin C (ASCORBIC ACID) 500 MG tablet Take 500 mg by mouth daily      Multiple Vitamins-Minerals (THERAPEUTIC MULTIVITAMIN-MINERALS) tablet Take 1 tablet by mouth daily      lisinopril-hydroCHLOROthiazide (PRINZIDE;ZESTORETIC) 20-12.5 MG per tablet Take 1 tablet by mouth daily      polyethylene glycol (MIRALAX) 17 GM/SCOOP powder Use as Directed per instructions provided by physician office. 238 g 0    bisacodyl (BISACODYL) 5 MG EC tablet TAKE 2 TABS THE DAY BEFORE COLONOSCOPY AS DIRECTED ON BOWEL PREP INSTRUCTIONS GIVEN BY PHYSICIAN OFFICE 2 tablet 0    sildenafil (VIAGRA) 100 MG tablet Take 1 tablet by mouth daily as needed for Erectile Dysfunction 30 tablet 2    sildenafil (VIAGRA) 100 MG tablet Take one tablet by mouth daily as needed for Erectile Dysfunction 30 tablet 0    sildenafil (VIAGRA) 50 MG tablet Take one tablet by mouth daily as needed for Erectile Dysfunction 30 tablet 3     No current facility-administered medications on file prior to encounter. Negative except for what is mentioned in the HPI. GENERAL PHYSICAL EXAM     Vitals: BP (!) 150/74   Pulse 76   Temp 97.4 °F (36.3 °C) (Temporal)   Resp 16   Ht 5' 8\" (1.727 m)   Wt 245 lb (111.1 kg)   SpO2 98%   BMI 37.25 kg/m²  Body mass index is 37.25 kg/m². GENERAL APPEARANCE:   Rakesh Saavedra is 64 y.o.,    male, mildly obese, nourished, conscious, alert. Does not appear to be distress or pain at this time. SKIN:  Warm, dry, no cyanosis or jaundice. HEAD:  Normocephalic, atraumatic, no swelling or tenderness. EYES:  Pupils equal, reactive to light. EARS:  No discharge, no marked hearing loss. NOSE:  No rhinorrhea, epistaxis or septal deformity. THROAT:  Not congested. No ulceration bleeding or discharge.                   NECK:  No stiffness, trachea central.  No palpable masses or L.N.                 CHEST:  Symmetrical and equal on expansion. HEART:  RRR S1 > S2. No audible murmurs or gallops. Radial pulses 2+ bilateral, dorsalis pedis pulses 2+ bilateral               LUNGS:  Equal on expansion, normal breath sounds. No adventitious sounds. ABDOMEN:  Obese. Soft on palpation. No localized tenderness. No guarding or rigidity. No palpable hepatosplenomegaly. Bowel sounds active in all 4 quadrants              LYMPHATICS:  No palpable cervical lymphadenopathy. LOCOMOTOR, BACK AND SPINE:  No tenderness or deformities. EXTREMITIES:  Symmetrical, no pretibial edema. no calf tenderness,  No discoloration or ulcerations. NEUROLOGIC:  The patient is conscious, alert, oriented, No apparent focal sensory or motor deficits.              PROVISIONAL DIAGNOSES / SURGERY:    SCREEN FOR COLON CANCER  COLORECTAL CANCER SCREENING, NOT HIGH RISK  Patient Active Problem List    Diagnosis Date Noted    Chest pain 08/10/2020    [facility]    Dysuria 04/05/2019    Plantar wart 02/02/2016           CARMELA Silverman - CNP on 1/20/2021 at 10:32 AM

## 2021-01-27 NOTE — TELEPHONE ENCOUNTER
Called and spoke to pt for confirmation for covid test on 1/28/21 and procedure 2/1/21. Pt confirmed both dates and times.

## 2021-01-28 ENCOUNTER — HOSPITAL ENCOUNTER (OUTPATIENT)
Dept: LAB | Age: 61
Setting detail: SPECIMEN
Discharge: HOME | End: 2021-01-28
Payer: MEDICAID

## 2021-01-28 DIAGNOSIS — Z01.818 PREOP TESTING: Primary | ICD-10-CM

## 2021-01-28 PROCEDURE — U0003 INFECTIOUS AGENT DETECTION BY NUCLEIC ACID (DNA OR RNA); SEVERE ACUTE RESPIRATORY SYNDROME CORONAVIRUS 2 (SARS-COV-2) (CORONAVIRUS DISEASE [COVID-19]), AMPLIFIED PROBE TECHNIQUE, MAKING USE OF HIGH THROUGHPUT TECHNOLOGIES AS DESCRIBED BY CMS-2020-01-R: HCPCS

## 2021-01-28 PROCEDURE — U0005 INFEC AGEN DETEC AMPLI PROBE: HCPCS

## 2021-01-29 ENCOUNTER — ANESTHESIA EVENT (OUTPATIENT)
Dept: ENDOSCOPY | Age: 61
End: 2021-01-29
Payer: MEDICAID

## 2021-01-29 LAB
SARS-COV-2, RAPID: NORMAL
SARS-COV-2: NORMAL
SARS-COV-2: NOT DETECTED
SOURCE: NORMAL

## 2021-01-30 ENCOUNTER — TELEPHONE (OUTPATIENT)
Dept: PRIMARY CARE CLINIC | Age: 61
End: 2021-01-30

## 2021-02-01 ENCOUNTER — ANESTHESIA (OUTPATIENT)
Dept: ENDOSCOPY | Age: 61
End: 2021-02-01
Payer: MEDICAID

## 2021-02-01 ENCOUNTER — HOSPITAL ENCOUNTER (OUTPATIENT)
Age: 61
Setting detail: OUTPATIENT SURGERY
Discharge: HOME | End: 2021-02-01
Attending: INTERNAL MEDICINE | Admitting: INTERNAL MEDICINE
Payer: MEDICAID

## 2021-02-01 VITALS
DIASTOLIC BLOOD PRESSURE: 84 MMHG | WEIGHT: 245 LBS | HEART RATE: 69 BPM | SYSTOLIC BLOOD PRESSURE: 155 MMHG | HEIGHT: 68 IN | BODY MASS INDEX: 37.13 KG/M2 | OXYGEN SATURATION: 98 % | TEMPERATURE: 98.2 F | RESPIRATION RATE: 18 BRPM

## 2021-02-01 VITALS
DIASTOLIC BLOOD PRESSURE: 74 MMHG | RESPIRATION RATE: 12 BRPM | OXYGEN SATURATION: 97 % | SYSTOLIC BLOOD PRESSURE: 120 MMHG

## 2021-02-01 LAB
GLUCOSE BLD-MCNC: 114 MG/DL (ref 75–110)
GLUCOSE BLD-MCNC: 115 MG/DL (ref 75–110)

## 2021-02-01 PROCEDURE — 6360000002 HC RX W HCPCS

## 2021-02-01 PROCEDURE — 7100000000 HC PACU RECOVERY - FIRST 15 MIN: Performed by: INTERNAL MEDICINE

## 2021-02-01 PROCEDURE — 45380 COLONOSCOPY AND BIOPSY: CPT | Performed by: INTERNAL MEDICINE

## 2021-02-01 PROCEDURE — 7100000001 HC PACU RECOVERY - ADDTL 15 MIN: Performed by: INTERNAL MEDICINE

## 2021-02-01 PROCEDURE — 82947 ASSAY GLUCOSE BLOOD QUANT: CPT

## 2021-02-01 PROCEDURE — 2580000003 HC RX 258: Performed by: ANESTHESIOLOGY

## 2021-02-01 PROCEDURE — 3700000000 HC ANESTHESIA ATTENDED CARE: Performed by: INTERNAL MEDICINE

## 2021-02-01 PROCEDURE — 88305 TISSUE EXAM BY PATHOLOGIST: CPT

## 2021-02-01 PROCEDURE — 2709999900 HC NON-CHARGEABLE SUPPLY: Performed by: INTERNAL MEDICINE

## 2021-02-01 PROCEDURE — 3609010400 HC COLONOSCOPY POLYPECTOMY HOT BIOPSY: Performed by: INTERNAL MEDICINE

## 2021-02-01 PROCEDURE — 45385 COLONOSCOPY W/LESION REMOVAL: CPT | Performed by: INTERNAL MEDICINE

## 2021-02-01 PROCEDURE — 2500000003 HC RX 250 WO HCPCS: Performed by: ANESTHESIOLOGY

## 2021-02-01 PROCEDURE — 3700000001 HC ADD 15 MINUTES (ANESTHESIA): Performed by: INTERNAL MEDICINE

## 2021-02-01 RX ORDER — DIPHENHYDRAMINE HCL 25 MG
25 CAPSULE ORAL EVERY 6 HOURS PRN
COMMUNITY
Start: 2020-09-22 | End: 2021-03-05 | Stop reason: ALTCHOICE

## 2021-02-01 RX ORDER — SODIUM CHLORIDE, SODIUM LACTATE, POTASSIUM CHLORIDE, CALCIUM CHLORIDE 600; 310; 30; 20 MG/100ML; MG/100ML; MG/100ML; MG/100ML
INJECTION, SOLUTION INTRAVENOUS CONTINUOUS
Status: DISCONTINUED | OUTPATIENT
Start: 2021-02-01 | End: 2021-02-01 | Stop reason: HOSPADM

## 2021-02-01 RX ORDER — SODIUM CHLORIDE 0.9 % (FLUSH) 0.9 %
10 SYRINGE (ML) INJECTION PRN
Status: DISCONTINUED | OUTPATIENT
Start: 2021-02-01 | End: 2021-02-01 | Stop reason: HOSPADM

## 2021-02-01 RX ORDER — LIDOCAINE HYDROCHLORIDE 10 MG/ML
1 INJECTION, SOLUTION EPIDURAL; INFILTRATION; INTRACAUDAL; PERINEURAL
Status: COMPLETED | OUTPATIENT
Start: 2021-02-01 | End: 2021-02-01

## 2021-02-01 RX ORDER — PROPOFOL 10 MG/ML
INJECTION, EMULSION INTRAVENOUS PRN
Status: DISCONTINUED | OUTPATIENT
Start: 2021-02-01 | End: 2021-02-01 | Stop reason: SDUPTHER

## 2021-02-01 RX ORDER — ERGOCALCIFEROL 1.25 MG/1
CAPSULE ORAL
COMMUNITY
Start: 2020-11-22

## 2021-02-01 RX ORDER — SODIUM CHLORIDE 0.9 % (FLUSH) 0.9 %
10 SYRINGE (ML) INJECTION EVERY 12 HOURS SCHEDULED
Status: DISCONTINUED | OUTPATIENT
Start: 2021-02-01 | End: 2021-02-01 | Stop reason: HOSPADM

## 2021-02-01 RX ADMIN — PROPOFOL 20 MG: 10 INJECTION, EMULSION INTRAVENOUS at 08:48

## 2021-02-01 RX ADMIN — PROPOFOL 30 MG: 10 INJECTION, EMULSION INTRAVENOUS at 08:46

## 2021-02-01 RX ADMIN — PROPOFOL 30 MG: 10 INJECTION, EMULSION INTRAVENOUS at 08:39

## 2021-02-01 RX ADMIN — PROPOFOL 20 MG: 10 INJECTION, EMULSION INTRAVENOUS at 08:49

## 2021-02-01 RX ADMIN — PROPOFOL 20 MG: 10 INJECTION, EMULSION INTRAVENOUS at 08:52

## 2021-02-01 RX ADMIN — PROPOFOL 20 MG: 10 INJECTION, EMULSION INTRAVENOUS at 08:53

## 2021-02-01 RX ADMIN — PROPOFOL 20 MG: 10 INJECTION, EMULSION INTRAVENOUS at 08:50

## 2021-02-01 RX ADMIN — PROPOFOL 30 MG: 10 INJECTION, EMULSION INTRAVENOUS at 08:40

## 2021-02-01 RX ADMIN — SODIUM CHLORIDE, POTASSIUM CHLORIDE, SODIUM LACTATE AND CALCIUM CHLORIDE: 600; 310; 30; 20 INJECTION, SOLUTION INTRAVENOUS at 07:53

## 2021-02-01 RX ADMIN — PROPOFOL 20 MG: 10 INJECTION, EMULSION INTRAVENOUS at 08:54

## 2021-02-01 RX ADMIN — PROPOFOL 20 MG: 10 INJECTION, EMULSION INTRAVENOUS at 09:02

## 2021-02-01 RX ADMIN — PROPOFOL 30 MG: 10 INJECTION, EMULSION INTRAVENOUS at 08:42

## 2021-02-01 RX ADMIN — PROPOFOL 20 MG: 10 INJECTION, EMULSION INTRAVENOUS at 09:04

## 2021-02-01 RX ADMIN — PROPOFOL 20 MG: 10 INJECTION, EMULSION INTRAVENOUS at 08:55

## 2021-02-01 RX ADMIN — PROPOFOL 30 MG: 10 INJECTION, EMULSION INTRAVENOUS at 08:59

## 2021-02-01 RX ADMIN — PROPOFOL 30 MG: 10 INJECTION, EMULSION INTRAVENOUS at 08:38

## 2021-02-01 RX ADMIN — PROPOFOL 10 MG: 10 INJECTION, EMULSION INTRAVENOUS at 08:44

## 2021-02-01 RX ADMIN — LIDOCAINE HYDROCHLORIDE 50 MG: 10 INJECTION, SOLUTION EPIDURAL; INFILTRATION; INTRACAUDAL; PERINEURAL at 08:37

## 2021-02-01 RX ADMIN — PROPOFOL 30 MG: 10 INJECTION, EMULSION INTRAVENOUS at 08:57

## 2021-02-01 RX ADMIN — PROPOFOL 70 MG: 10 INJECTION, EMULSION INTRAVENOUS at 08:37

## 2021-02-01 ASSESSMENT — PULMONARY FUNCTION TESTS
PIF_VALUE: 1
PIF_VALUE: 0
PIF_VALUE: 1
PIF_VALUE: 0
PIF_VALUE: 1
PIF_VALUE: 1
PIF_VALUE: 0
PIF_VALUE: 1
PIF_VALUE: 1
PIF_VALUE: 0
PIF_VALUE: 1
PIF_VALUE: 0
PIF_VALUE: 1
PIF_VALUE: 1
PIF_VALUE: 0
PIF_VALUE: 1
PIF_VALUE: 1
PIF_VALUE: 0
PIF_VALUE: 1
PIF_VALUE: 0
PIF_VALUE: 1
PIF_VALUE: 0
PIF_VALUE: 1

## 2021-02-01 ASSESSMENT — ENCOUNTER SYMPTOMS
NAUSEA: 0
COUGH: 0
SORE THROAT: 0
SHORTNESS OF BREATH: 0
STRIDOR: 0
CONSTIPATION: 0
ABDOMINAL PAIN: 1
BLOOD IN STOOL: 0
VOMITING: 0
DIARRHEA: 0
WHEEZING: 0

## 2021-02-01 ASSESSMENT — PAIN DESCRIPTION - DESCRIPTORS: DESCRIPTORS: ACHING;TINGLING

## 2021-02-01 NOTE — ANESTHESIA POSTPROCEDURE EVALUATION
POST- ANESTHESIA EVALUATION       Pt Name: Jan Taylor  MRN: [id number]  [place]: 1960  Date of evaluation: 2/1/2021  Time:  10:07 AM      BP (!) 155/84   Pulse 69   Temp 98.2 °F (36.8 °C)   Resp 18   Ht 5' 8\" (1.727 m)   Wt 245 lb (111.1 kg)   SpO2 98%   BMI 37.25 kg/m²      Consciousness Level  Awake  Cardiopulmonary Status  Stable  Pain Adequately Treated YES  Nausea / Vomiting  NO  Adequate Hydration  YES  Anesthesia Related Complications NONE      Electronically signed by Ap Wright MD on 2/1/2021 at 10:07 AM       Department of Anesthesiology  Postprocedure Note    Patient: Jan Taylor  MRN: [id number]  YOB: 1960  Date of evaluation: 2/1/2021  Time:  10:07 AM     Procedure Summary     Date: 02/01/21 Room / Location: [address] [place] [address] 01 / [facility] ENDO    Anesthesia Start: 0825 Anesthesia Stop: 4608    Procedure: COLONOSCOPY POLYPECTOMY HOT BIOPSY (N/A Anus) Diagnosis: (SCREEN FOR COLON CANCER)    Surgeons: Opal Joy MD Responsible Provider: Ap Wright MD    Anesthesia Type: MAC ASA Status: 2          Anesthesia Type: MAC    Aura Phase I: Aura Score: 10    Aura Phase II:      Last vitals: Reviewed and per EMR flowsheets.        Anesthesia Post Evaluation

## 2021-02-01 NOTE — H&P
HISTORY and [address]         NAME:  Brooke Burroughs  MRN: [id number]   YOB: 1960   Date: 2/1/2021   Age: 64 y.o. Gender: male      COMPLAINT AND PRESENT HISTORY:   Brooke Burroughs is 64 y.o.,  male, undergoing SCREENING COLONOSCOPY for COLON CA SCREENING, NOT HIGH RISK per Dr. Yahir Andrew.     See HPI note below per CARMELA Wilson-CNP on 1-20-21, reviewed with patient, I agree w/findings except ABD pain he states he has had longer than 2 weeks, but is intermittently:     This is patient's first Colonoscopy. Patient denies any  FH of Colon. Patient reports no changes in bowel habits, No diarrhea or constipation. No GI /Rectal bleeding, no experiencing red/ black/ BRBPR stools.    Patient has a history of  cramping/aching bilateral lower abdominal pain started two weeks ago , pt rates his pain 6/10.pain is pain is relieved by bowel movement. Pt has no hx of GERD, No heartburn or dysphagia. Pt denies fever/chills, chest pain , SOB, no hx of infection MRSA, no problem with anesthesia.   Pt is not on any kind of anti coagulant medication      Review of additional significant medical hx:  HTN:  Current medications r/t condition: Lisinopril-HCTZ 20-12.5 mg QD, states lately BP has been spiking at times.     DILAN: Does not use CPAP- states he was dx'd with severe DILAN, supposed to have a machine, but does not have one at this time.     Prediabetes noted per chart:        Lab Results   Component Value Date     LABA1C 6.1 (H) 10/08/2020            Lab Results   Component Value Date      10/08/2020         Prostate CA: S/p prostatectomy- c/o pain/numbness to LLE.     NPO status: Patient states they have been NPO since before midnight. Medications last taken: LAST NIGHT. Anticoagulation status: Patient denies taking any anti-coagulants, including aspirin currently. Prep fully completed: YES.      Pertinent family hx: Denies family hx of esophageal and colon CA.     Denies any hx of blood clots. Denies hx of MRSA infection. Denies any previous complications w/anesthesia. Nicotine status: Never a smoker.   PAST MEDICAL HISTORY      Past Medical History   Past Medical History:   Diagnosis Date    Cancer [facility]) 2019     prostate    Hematuria      Hypertension      Nerve injury       left leg , states  s/p prostatectomy    DILAN (obstructive sleep apnea)       DOES NOT HAVE MACHINE            SURGICAL HISTORY        Past Surgical History         Past Surgical History:   Procedure Laterality Date    APPENDECTOMY        CIRCUMCISION N/A 7/10/2020     CIRCUMCISION performed by Ellie Ho MD at [address] [facility] [address] 2/7/2019     CYSTOSCOPY performed by Ellie Ho MD at [address] [place] N/A 5/28/2020     CYSTOSCOPY performed by Ellie Ho MD at [address] [facility] Right 2004     NECK    PROSTATE BIOPSY N/A 3/21/2019     PROSTATE BIOPSY, ULTRASOUND  (OFFICE TO CONTACT [place] performed by Ellie Ho MD at [address] N/A 5/31/2019     XI LAPAROSCOPIC ROBOTIC RADICAL PROSTATECTOMY, PELVIC LYMPH NODE DISSECTION performed by Ellie Ho MD at [address] SEPTOPLASTY                SOCIAL HISTORY        Social History   Social History            Socioeconomic History    Marital status: Single       Spouse name: None    Number of children: None    Years of education: None    Highest education level: None   Occupational History    None   Social Needs    Financial resource strain: None    Food insecurity       Worry: None       Inability: None    Transportation needs       Medical: None       Non-medical: None   Tobacco Use    Smoking status: Never Smoker    Smokeless tobacco: Never Used   Substance and Sexual Activity    Alcohol use: No    Drug use: No    Sexual activity: Yes       Partners: Female       Birth control/protection: Condom   Lifestyle    Physical activity       Days per week: None       Minutes per session: None    congestion, ear pain, postnasal drip and sore throat. Respiratory: Negative for cough, shortness of breath and wheezing. Cardiovascular: Negative for chest pain, palpitations and leg swelling. Gastrointestinal: Positive for abdominal pain (Intermittent). Negative for blood in stool, constipation, diarrhea, nausea and vomiting. Genitourinary: Negative. Negative for hematuria (Hx of). Musculoskeletal: Positive for arthralgias (LLE). Neurological: Positive for numbness (LLE). Hematological: Does not bruise/bleed easily.         GENERAL PHYSICAL EXAM      Vitals: Review current vital signs per RN flow sheet.     GENERAL APPEARANCE:   Charissa Conrad is 64 y.o.,  male, moderately obese, nourished, conscious, alert. Does not appear to be in any distress or pain at this time. Physical Exam   Constitutional: He is oriented to person, place, and time. He appears well-developed and well-nourished. Non-toxic appearance. He does not have a sickly appearance. He does not appear ill. No distress. HENT:   Head: Normocephalic. Right Ear: External ear normal.   Left Ear: External ear normal.   Mouth/Throat: Oropharynx is clear and moist and mucous membranes are normal. No oropharyngeal exudate, posterior oropharyngeal edema, posterior oropharyngeal erythema or tonsillar abscesses. Eyes: Conjunctivae are normal. Right eye exhibits no discharge. Left eye exhibits no discharge. Cardiovascular: Normal rate, regular rhythm, normal heart sounds and intact distal pulses. Pulses:       Radial pulses are 2+ on the right side and 2+ on the left side. Dorsalis pedis pulses are 2+ on the right side and 2+ on the left side. Posterior tibial pulses are 2+ on the right side and 2+ on the left side. Pulmonary/Chest: Effort normal and breath sounds normal. No respiratory distress. He has no wheezes. He has no rales. Abdominal: Soft.  Normal appearance and bowel sounds are normal. He exhibits no distension and no mass. There is no abdominal tenderness. There is no rebound and no guarding. Well healed surgical scars to ABD. Musculoskeletal: Normal range of motion. Right lower leg: He exhibits tenderness (Chronic LLE- denies calf pain, negative Anabell's sign). He exhibits no swelling. Left lower leg: He exhibits no tenderness and no swelling. Neurological: He is alert and oriented to person, place, and time. Skin: Skin is warm and dry. He is not diaphoretic. Psychiatric: He has a normal mood and affect.  His behavior is normal.         RECENT LAB WORK            Lab Results   Component Value Date      (L) 01/20/2021     K 4.0 01/20/2021     CL 98 01/20/2021     CO2 27 01/20/2021     BUN 16 01/20/2021     CREATININE 0.79 01/20/2021     GLUCOSE 204 (H) 01/20/2021     CALCIUM 9.4 01/20/2021     PROT 7.4 10/08/2020     LABALBU 4.3 10/08/2020     BILITOT 1.32 (H) 10/08/2020     ALKPHOS 61 10/08/2020     AST 17 10/08/2020     ALT 30 10/08/2020     LABGLOM >60 01/20/2021     GFRAA >60 01/20/2021            Lab Results   Component Value Date     WBC 6.7 01/20/2021     HGB 14.7 01/20/2021     HCT 43.4 01/20/2021     MCV 86.1 01/20/2021      01/20/2021      PROVISIONAL DIAGNOSES / SURGERY:       SCREEN FOR COLON CANCER     COLORECTAL CANCER SCREENING, NOT HIGH RISK          Patient Active Problem List     Diagnosis Date Noted    Chest pain 08/10/2020    [facility] 05/31/2019    Dysuria 04/05/2019    Plantar wart 02/02/2016            Canelo Bowens, CARMELA - CNP on 2/1/2021 at 6:37 AM

## 2021-02-01 NOTE — ANESTHESIA PRE PROCEDURE
Department of Anesthesiology  Preprocedure Note       Name:  Rosalva Keith   Age:  64 y.o.  :  1960                                          MRN:  [id number]         Date:  2021      Surgeon: Jasmina Auguste):  Kanchan Alvarenga MD    Procedure: Procedure(s):  COLORECTAL CANCER SCREENING, NOT HIGH RISK    Medications prior to admission:   Prior to Admission medications    Medication Sig Start Date End Date Taking? Authorizing Provider   polyethylene glycol (MIRALAX) 17 GM/SCOOP powder Use as Directed per instructions provided by physician office.  21   Bette Vargas MD   bisacodyl (BISACODYL) 5 MG EC tablet TAKE 2 TABS THE DAY BEFORE COLONOSCOPY AS DIRECTED ON BOWEL PREP INSTRUCTIONS GIVEN BY PHYSICIAN OFFICE 21   Kanchan Alvarenga MD   sildenafil (VIAGRA) 100 MG tablet Take 1 tablet by mouth daily as needed for Erectile Dysfunction 10/16/20   Kyle Najera MD   sildenafil (VIAGRA) 100 MG tablet Take one tablet by mouth daily as needed for Erectile Dysfunction 20   Lynsey Toth MD   sildenafil (VIAGRA) 50 MG tablet Take one tablet by mouth daily as needed for Erectile Dysfunction 9/10/20   Lynsey Toth MD   vitamin C (ASCORBIC ACID) 500 MG tablet Take 500 mg by mouth daily    Historical Provider, MD   Multiple Vitamins-Minerals (THERAPEUTIC MULTIVITAMIN-MINERALS) tablet Take 1 tablet by mouth daily    Historical Provider, MD   lisinopril-hydroCHLOROthiazide (PRINZIDE;ZESTORETIC) 20-12.5 MG per tablet Take 1 tablet by mouth daily    Historical Provider, MD       Current medications:    Current Facility-Administered Medications   Medication Dose Route Frequency Provider Last Rate Last Admin    lactated ringers infusion   Intravenous Continuous Bassem Damon MD        sodium chloride flush 0.9 % injection 10 mL  10 mL Intravenous 2 times per day Bassem Damon MD        sodium chloride flush 0.9 % injection 10 mL  10 mL Intravenous PRN Bassem Damon MD  lidocaine PF 1 % injection 1 mL  1 mL Intradermal Once PRN Kelly Weston MD           Allergies:  No Known Allergies    Problem List:    Patient Active Problem List   Diagnosis Code    Plantar wart B07.0    Dysuria R30.0    Prostate cancer ([facility] [address] C61    Chest pain R07.9       Past Medical History:        Diagnosis Date    Cancer [facility]) 2019    prostate    Hematuria     Hypertension     Intermittent abdominal pain     Nerve injury     left leg , states  s/p prostatectomy    DILAN (obstructive sleep apnea)     DOES NOT HAVE MACHINE    Prediabetes        Past Surgical History:        Procedure Laterality Date    APPENDECTOMY      CIRCUMCISION N/A 7/10/2020    CIRCUMCISION performed by Bladimir Hughes MD at [address] [facility] N/A 2/7/2019    CYSTOSCOPY performed by Bladimir Hughes MD at [address] [facility] N/A 5/28/2020    CYSTOSCOPY performed by Bladimir Hughes MD at [address] [facility],[address] Right 2004    [address] [facility],[address] N/A 3/21/2019    PROSTATE BIOPSY, ULTRASOUND  (OFFICE TO CONTACT [place] performed by Bladimir Hughes MD at [facility] 5/31/2019    XI LAPAROSCOPIC [address] [facility], PELVIC LYMPH NODE DISSECTION performed by Bladimir Hughes MD at [address] [place] [address]         Social History:    Social History     Tobacco Use    Smoking status: Never Smoker    Smokeless tobacco: Never Used   Substance Use Topics    Alcohol use: No                                Counseling given: Not Answered      Vital Signs (Current): There were no vitals filed for this visit.                                            BP Readings from Last 3 Encounters:   01/20/21 (!) 150/74   10/16/20 131/77   10/05/20 (!) 157/96       NPO Status:                                                                                 BMI:   Wt Readings from Last 3 Encounters:   01/20/21 245 lb (111.1 kg)   10/16/20 239 lb (108.4 kg)   10/05/20 235 lb (106.6 kg) There is no height or weight on file to calculate BMI.    CBC:   Lab Results   Component Value Date    WBC 6.7 01/20/2021    RBC 5.04 01/20/2021    HGB 14.7 01/20/2021    HCT 43.4 01/20/2021    MCV 86.1 01/20/2021    RDW 13.1 01/20/2021     01/20/2021       CMP:   Lab Results   Component Value Date     01/20/2021    K 4.0 01/20/2021    CL 98 01/20/2021    CO2 27 01/20/2021    BUN 16 01/20/2021    CREATININE 0.79 01/20/2021    GFRAA >60 01/20/2021    LABGLOM >60 01/20/2021    GLUCOSE 204 01/20/2021    PROT 7.4 10/08/2020    CALCIUM 9.4 01/20/2021    BILITOT 1.32 10/08/2020    ALKPHOS 61 10/08/2020    AST 17 10/08/2020    ALT 30 10/08/2020       POC Tests: No results for input(s): POCGLU, POCNA, POCK, POCCL, POCBUN, POCHEMO, POCHCT in the last 72 hours.     Coags: No results found for: PROTIME, INR, APTT    HCG (If Applicable): No results found for: PREGTESTUR, PREGSERUM, HCG, HCGQUANT     ABGs: No results found for: PHART, PO2ART, XFQ7CHE, SCH4FEE, BEART, W5VUUKBX     Type & Screen (If Applicable):  No results found for: LABABO, LABRH    Drug/Infectious Status (If Applicable):  Lab Results   Component Value Date    HEPCAB NONREACTIVE 05/09/2020       COVID-19 Screening (If Applicable):   Lab Results   Component Value Date    COVID19 Not Detected 01/28/2021    COVID19 Not Detected 07/05/2020         Anesthesia Evaluation  Patient summary reviewed and Nursing notes reviewed  Airway: Mallampati: III  TM distance: >3 FB   Neck ROM: full  Mouth opening: > = 3 FB Dental:          Pulmonary: breath sounds clear to auscultation  (+) sleep apnea: on noncompliant,      (-) rhonchi, wheezes, rales and stridor                           Cardiovascular:    (+) hypertension: no interval change,     (-) murmur, weak pulses,  friction rub, systolic click, carotid bruit,  JVD and peripheral edema    ECG reviewed  Rhythm: regular  Rate: normal                    Neuro/Psych:               GI/Hepatic/Renal: Endo/Other:    (+) DiabetesType II DM, , .                 Abdominal:           Vascular:                                      Anesthesia Plan      MAC     ASA 2       Induction: intravenous. Anesthetic plan and risks discussed with patient. Plan discussed with CRNA.                   Mariana Young MD   2/1/2021

## 2021-02-01 NOTE — OP NOTE
COLONOSCOPY    DATE OF PROCEDURE: 2/1/2021    SURGEON: Zac Weaver MD    ASSISTANT: None    PREOPERATIVE DIAGNOSIS: Screening colonoscopy    POSTOPERATIVE DIAGNOSIS: Multiple colon polyps, diverticulosis    OPERATION: Total colonoscopy and snare polypectomy x3, excision of polyp with biopsy forceps from right colon    ANESTHESIA: MAC    ESTIMATED BLOOD LOSS: None    COMPLICATIONS: None     SPECIMENS:  Was Obtained: Polyp right colon, polyp hepatic flexure, polyp sigmoid colon, polyp rectosigmoid area    HISTORY: The patient is a 64y.o. year old male with history of above preop diagnosis. I recommended colonoscopy with possible biopsy or polypectomy and I explained the risk, benefits, expected outcome, and alternatives to the procedure. Risks included but are not limited to bleeding, infection, respiratory distress, hypotension, and perforation of the colon and possibility of missing a lesion. The patient understands and is in agreement. PROCEDURE:  The patient's SPO2 remained above 90% throughout the procedure. Digital rectal exam was normal.  The colonoscope was inserted through the anus into the rectum and advanced under direct vision to the cecum without difficulty. Terminal ileum was examined for approximately 2 inches. The prep was adequate. Findings:  Terminal ileum: normal    Cecum/Ascending colon: abnormal: In the mid right colon a polyp which is about 5 mm, flat. This polyp is completely excised using biopsy forceps. At the hepatic flexure a polyp which is about 5 to 7 mm sessile. This is removed with cold snare    Transverse colon: normal    Descending/Sigmoid colon: abnormal:    Diverticulosis.     In the upper sigmoid colon a polyp which is about 7 mm removed using snare and cautery technique    Rectum/Anus: examined in normal and retroflexed positions and was, in the rectosigmoid area a polyp about 7 to 10 mm, sessile, removed with snare and cautery technique    Withdrawal Time was (minutes): 15          The colon was decompressed and the scope was removed. The patient tolerated the procedure without unusual events. During the procedure, the patient's blood pressure, pulse and oxygen saturation remained stable and documented. No unusual events occurred during the procedure. Patient was transferred to recovery room and will be discharged when criteria is met. Recommendations/Plan:   1. F/U Biopsies  2. F/U In Office as instructed  3. Discussed with the family  4. High fiber diet   5. Precautions to avoid constipation     Next colonoscopy: 3 years.   If Colonoscopy is less than 10 years the recommended reason is due:polyps, Cologuard test positive    Electronically signed by Shreyas Estrada MD  on 2/1/2021 at 9:05 AM

## 2021-02-01 NOTE — H&P
HISTORY and [address]       NAME:  Rosalva Keith  MRN: [id number]   YOB: 1960   Date: 2/1/2021   Age: 64 y.o. Gender: male     COMPLAINT AND PRESENT HISTORY:   Rosalva Keith is 64 y.o.,  male, undergoing SCREENING COLONOSCOPY for COLON CA SCREENING, NOT HIGH RISK per Dr. Michael Ivy. See HPI note below per MAMIE Saucedo on 1-20-21, reviewed with patient, I agree w/findings except ABD pain he states he has had longer than 2 weeks, but is intermittently: This is patient's first Colonoscopy. Patient denies any  FH of Colon. Patient reports no changes in bowel habits, No diarrhea or constipation. No GI /Rectal bleeding, no experiencing red/ black/ BRBPR stools. Patient has a history of  cramping/aching bilateral lower abdominal pain started two weeks ago , pt rates his pain 6/10.pain is pain is relieved by bowel movement. Pt has no hx of GERD, No heartburn or dysphagia. Pt denies fever/chills, chest pain , SOB, no hx of infection MRSA, no problem with anesthesia. Pt is not on any kind of anti coagulant medication      Review of additional significant medical hx:  HTN:  Current medications r/t condition: Lisinopril-HCTZ 20-12.5 mg QD, states lately BP has been spiking at times. DILAN: Does not use CPAP- states he was dx'd with severe DILAN, supposed to have a machine, but does not have one at this time. Prediabetes noted per chart:  Lab Results   Component Value Date    LABA1C 6.1 (H) 10/08/2020     Lab Results   Component Value Date     10/08/2020       Prostate CA: S/p prostatectomy- c/o pain/numbness to LLE.    NPO status: Patient states they have been NPO since before midnight. Medications last taken: LAST NIGHT. Anticoagulation status: Patient denies taking any anti-coagulants, including aspirin currently. Prep fully completed: YES. Pertinent family hx: Denies family hx of esophageal and colon CA.     Denies any hx of blood clots.  Denies hx of MRSA infection. Denies any previous complications w/anesthesia. Nicotine status: Never a smoker.   PAST MEDICAL HISTORY     Past Medical History:   Diagnosis Date    Cancer [facility]) 2019    prostate    Hematuria     Hypertension     Nerve injury     left leg , states  s/p prostatectomy    DILAN (obstructive sleep apnea)     DOES NOT HAVE MACHINE       SURGICAL HISTORY       Past Surgical History:   Procedure Laterality Date    APPENDECTOMY      CIRCUMCISION N/A 7/10/2020    CIRCUMCISION performed by Terrence Wynn MD at [address] [facility] N/A 2/7/2019    CYSTOSCOPY performed by Terrence Wynn MD at [address] [facility] N/A 5/28/2020    CYSTOSCOPY performed by Terrence Wynn MD at [address] [facility] Right 2004    NECK    PROSTATE BIOPSY N/A 3/21/2019    PROSTATE BIOPSY, ULTRASOUND  (OFFICE TO CONTACT [place] performed by Terrence Wynn MD at [address] [facility] N/A 5/31/2019    XI LAPAROSCOPIC ROBOTIC RADICAL PROSTATECTOMY, PELVIC LYMPH NODE DISSECTION performed by Terrence Wynn MD at [address] [facility]       Social History     Socioeconomic History    Marital status: Single     Spouse name: None    Number of children: None    Years of education: None    Highest education level: None   Occupational History    None   Social Needs    Financial resource strain: None    Food insecurity     Worry: None     Inability: None    Transportation needs     Medical: None     Non-medical: None   Tobacco Use    Smoking status: Never Smoker    Smokeless tobacco: Never Used   Substance and Sexual Activity    Alcohol use: No    Drug use: No    Sexual activity: Yes     Partners: Female     Birth control/protection: Condom   Lifestyle    Physical activity     Days per week: None     Minutes per session: None    Stress: None   Relationships    Social connections     Talks on phone: None     Gets together: None     Attends Zoroastrian service: None     Active member of club or organization: None     Attends meetings of clubs or organizations: None     Relationship status: None    Intimate partner violence     Fear of current or ex partner: None     Emotionally abused: None     Physically abused: None     Forced sexual activity: None   Other Topics Concern    None   Social History Narrative    None       REVIEW OF SYSTEMS    No Known Allergies    Current Outpatient Medications on File Prior to Encounter   Medication Sig Dispense Refill    vitamin C (ASCORBIC ACID) 500 MG tablet Take 500 mg by mouth daily      Multiple Vitamins-Minerals (THERAPEUTIC MULTIVITAMIN-MINERALS) tablet Take 1 tablet by mouth daily      lisinopril-hydroCHLOROthiazide (PRINZIDE;ZESTORETIC) 20-12.5 MG per tablet Take 1 tablet by mouth daily      polyethylene glycol (MIRALAX) 17 GM/SCOOP powder Use as Directed per instructions provided by physician office. 238 g 0    bisacodyl (BISACODYL) 5 MG EC tablet TAKE 2 TABS THE DAY BEFORE COLONOSCOPY AS DIRECTED ON BOWEL PREP INSTRUCTIONS GIVEN BY PHYSICIAN OFFICE 2 tablet 0    sildenafil (VIAGRA) 100 MG tablet Take 1 tablet by mouth daily as needed for Erectile Dysfunction 30 tablet 2    sildenafil (VIAGRA) 100 MG tablet Take one tablet by mouth daily as needed for Erectile Dysfunction 30 tablet 0    sildenafil (VIAGRA) 50 MG tablet Take one tablet by mouth daily as needed for Erectile Dysfunction 30 tablet 3     No current facility-administered medications on file prior to encounter. (Notation: Medications listed above are not currently reconciled at the signing of this H&P note, to be reconciled in pre-op per RN)    Review of Systems   Constitutional: Negative for appetite change, chills, fever and unexpected weight change. HENT: Negative for congestion, ear pain, postnasal drip and sore throat. Respiratory: Negative for cough, shortness of breath and wheezing. Cardiovascular: Negative for chest pain, palpitations and leg swelling. Gastrointestinal: Positive for abdominal pain (Intermittent). Negative for blood in stool, constipation, diarrhea, nausea and vomiting. Genitourinary: Negative. Negative for hematuria (Hx of). Musculoskeletal: Positive for arthralgias (LLE). Neurological: Positive for numbness (LLE). Hematological: Does not bruise/bleed easily. GENERAL PHYSICAL EXAM     Vitals: Review current vital signs per RN flow sheet. GENERAL APPEARANCE:   Franc Morales is 64 y.o.,  male, moderately obese, nourished, conscious, alert. Does not appear to be in any distress or pain at this time. Physical Exam   Constitutional: He is oriented to person, place, and time. He appears well-developed and well-nourished. Non-toxic appearance. He does not have a sickly appearance. He does not appear ill. No distress. HENT:   Head: Normocephalic. Right Ear: External ear normal.   Left Ear: External ear normal.   Mouth/Throat: Oropharynx is clear and moist and mucous membranes are normal. No oropharyngeal exudate, posterior oropharyngeal edema, posterior oropharyngeal erythema or tonsillar abscesses. Eyes: Conjunctivae are normal. Right eye exhibits no discharge. Left eye exhibits no discharge. Cardiovascular: Normal rate, regular rhythm, normal heart sounds and intact distal pulses. Pulses:       Radial pulses are 2+ on the right side and 2+ on the left side. Dorsalis pedis pulses are 2+ on the right side and 2+ on the left side. Posterior tibial pulses are 2+ on the right side and 2+ on the left side. Pulmonary/Chest: Effort normal and breath sounds normal. No respiratory distress. He has no wheezes. He has no rales. Abdominal: Soft. Normal appearance and bowel sounds are normal. He exhibits no distension and no mass. There is no abdominal tenderness. There is no rebound and no guarding. Well healed surgical scars to ABD. Musculoskeletal: Normal range of motion.       Right lower leg: He exhibits tenderness (Chronic LLE- denies calf pain, negative Anabell's sign). He exhibits no swelling. Left lower leg: He exhibits no tenderness and no swelling. Neurological: He is alert and oriented to person, place, and time. Skin: Skin is warm and dry. He is not diaphoretic. Psychiatric: He has a normal mood and affect.  His behavior is normal.       RECENT LAB WORK     Lab Results   Component Value Date     (L) 01/20/2021    K 4.0 01/20/2021    CL 98 01/20/2021    CO2 27 01/20/2021    BUN 16 01/20/2021    CREATININE 0.79 01/20/2021    GLUCOSE 204 (H) 01/20/2021    CALCIUM 9.4 01/20/2021    PROT 7.4 10/08/2020    LABALBU 4.3 10/08/2020    BILITOT 1.32 (H) 10/08/2020    ALKPHOS 61 10/08/2020    AST 17 10/08/2020    ALT 30 10/08/2020    LABGLOM >60 01/20/2021    GFRAA >60 01/20/2021     Lab Results   Component Value Date    WBC 6.7 01/20/2021    HGB 14.7 01/20/2021    HCT 43.4 01/20/2021    MCV 86.1 01/20/2021     01/20/2021     PROVISIONAL DIAGNOSES / SURGERY:      SCREEN FOR COLON CANCER    COLORECTAL CANCER SCREENING, NOT HIGH RISK    Patient Active Problem List    Diagnosis Date Noted    Chest pain 08/10/2020    [facility]    Dysuria 04/05/2019    Plantar wart 02/02/2016           CARMELA Henderson - CNP on 2/1/2021 at 6:37 AM

## 2021-02-02 LAB — SURGICAL PATHOLOGY REPORT: NORMAL

## 2021-02-17 ENCOUNTER — TELEPHONE (OUTPATIENT)
Dept: GASTROENTEROLOGY | Age: 61
End: 2021-02-17

## 2021-02-20 ENCOUNTER — HOSPITAL ENCOUNTER (OUTPATIENT)
Dept: VASCULAR LAB | Age: 61
Discharge: HOME | End: 2021-02-20
Payer: MEDICAID

## 2021-02-20 DIAGNOSIS — I99.9 DECREASED CIRCULATION: ICD-10-CM

## 2021-02-20 PROCEDURE — 93971 EXTREMITY STUDY: CPT

## 2021-02-20 PROCEDURE — 93926 LOWER EXTREMITY STUDY: CPT

## 2021-03-05 ENCOUNTER — OFFICE VISIT (OUTPATIENT)
Dept: GASTROENTEROLOGY | Age: 61
End: 2021-03-05
Payer: MEDICAID

## 2021-03-05 VITALS
DIASTOLIC BLOOD PRESSURE: 84 MMHG | SYSTOLIC BLOOD PRESSURE: 131 MMHG | WEIGHT: 238.1 LBS | OXYGEN SATURATION: 97 % | BODY MASS INDEX: 36.09 KG/M2 | HEIGHT: 68 IN | TEMPERATURE: 97.3 F | HEART RATE: 80 BPM

## 2021-03-05 DIAGNOSIS — K57.90 DIVERTICULOSIS: ICD-10-CM

## 2021-03-05 DIAGNOSIS — D12.6 TUBULAR ADENOMA OF COLON: Primary | ICD-10-CM

## 2021-03-05 PROCEDURE — 99213 OFFICE O/P EST LOW 20 MIN: CPT | Performed by: NURSE PRACTITIONER

## 2021-03-05 PROCEDURE — 3017F COLORECTAL CA SCREEN DOC REV: CPT | Performed by: NURSE PRACTITIONER

## 2021-03-05 PROCEDURE — G8484 FLU IMMUNIZE NO ADMIN: HCPCS | Performed by: NURSE PRACTITIONER

## 2021-03-05 PROCEDURE — G8427 DOCREV CUR MEDS BY ELIG CLIN: HCPCS | Performed by: NURSE PRACTITIONER

## 2021-03-05 PROCEDURE — G8417 CALC BMI ABV UP PARAM F/U: HCPCS | Performed by: NURSE PRACTITIONER

## 2021-03-05 PROCEDURE — 1036F TOBACCO NON-USER: CPT | Performed by: NURSE PRACTITIONER

## 2021-03-05 ASSESSMENT — ENCOUNTER SYMPTOMS
CHOKING: 0
BACK PAIN: 0
RECTAL PAIN: 0
DIARRHEA: 1
VOICE CHANGE: 0
ABDOMINAL DISTENTION: 1
CONSTIPATION: 1
SHORTNESS OF BREATH: 0
BLOOD IN STOOL: 0
SORE THROAT: 0
VOMITING: 0
ABDOMINAL PAIN: 1
COUGH: 0
RESPIRATORY NEGATIVE: 1
EYES NEGATIVE: 1
ANAL BLEEDING: 0
TROUBLE SWALLOWING: 0
NAUSEA: 0

## 2021-03-05 NOTE — PROGRESS NOTES
GI CLINIC FOLLOW UP    INTERVAL HISTORY:   No referring provider defined for this encounter. Chief Complaint   Patient presents with    Follow-up     Patient is here today to f/u on his colon     HISTORY OF PRESENT ILLNESS:    Patient being seen for follow-up screening colonoscopy. Colonoscopy prep was adequate. Noted to have 5 mm flat polyp in the mid right colon excised with biopsy forceps. This was a tubular adenoma. At the hepatic flexure a 5 to 7 mm sessile polyp removed with cold snare. This too was a tubular adenoma. Noted to have diverticulosis. In the upper sigmoid colon a 7 mm polyp removed using snare and cautery technique. This was a tubular adenoma. In the rectosigmoid area a 7 to 10 mm sessile polyp removed with snare and cautery technique. This was a tubular adenoma. At present patient denies any GI symptoms. No significant constipation or diarrhea. No melena, hematochezia. No straining with bowel movements. No dysphagia, odynophagia, dyspeptic symptoms. Patient has good appetite. There is no weight loss. No nausea, vomiting. Patient denies history of liver disease. Denies alcohol use. Non-smoker. Past Medical,Family, and Social History reviewed and does contribute to the patient presentingcondition. Patient's PMH/PSH,SH,PSYCH Hx, MEDs, ALLERGIES, and ROS were all reviewed and updated in the appropriate sections.     PAST MEDICAL HISTORY:  Past Medical History:   Diagnosis Date    Cancer [facility]) 2019    prostate    Hematuria     Hypertension     Intermittent abdominal pain     Nerve injury     left leg , states  s/p prostatectomy    DILAN (obstructive sleep apnea)     DOES NOT HAVE MACHINE    Prediabetes        Past Surgical History:   Procedure Laterality Date    APPENDECTOMY      CIRCUMCISION N/A 7/10/2020    CIRCUMCISION performed by Kaylee Blair MD at [facility] 2/1/2021 COLONOSCOPY POLYPECTOMY HOT BIOPSY performed by Angelo Go MD at [facility] 8 N/A 2/7/2019    CYSTOSCOPY performed by Srinivas Holt MD at [facility] [address] N/A 5/28/2020    CYSTOSCOPY performed by Srinivas Holt MD at [address] [facility] Right 2004    NECK    PROSTATE BIOPSY N/A 3/21/2019    PROSTATE BIOPSY, ULTRASOUND  (OFFICE TO CONTACT [place] performed by Srinivas Holt MD at [facility] 5/31/2019    XI LAPAROSCOPIC [address], PELVIC LYMPH NODE DISSECTION performed by Srinivas Holt MD at [address]:    Current Outpatient Medications:     vitamin D (ERGOCALCIFEROL) 1.25 MG (64575 UT) CAPS capsule, TK ONE CAPSULE PO ONCE A WEEK, Disp: , Rfl:     vitamin C (ASCORBIC ACID) 500 MG tablet, Take 500 mg by mouth daily, Disp: , Rfl:     Multiple Vitamins-Minerals (THERAPEUTIC MULTIVITAMIN-MINERALS) tablet, Take 1 tablet by mouth daily, Disp: , Rfl:     lisinopril-hydroCHLOROthiazide (PRINZIDE;ZESTORETIC) 20-12.5 MG per tablet, Take 1 tablet by mouth daily, Disp: , Rfl:     ALLERGIES:   No Known Allergies    FAMILY HISTORY:       Problem Relation Age of Onset    High Blood Pressure Mother     No Known Problems Father     Lupus Sister     High Blood Pressure Sister     No Known Problems Brother     No Known Problems Maternal Grandmother     No Known Problems Maternal Grandfather     No Known Problems Paternal Grandmother     No Known Problems Paternal Grandfather     No Known Problems Sister     No Known Problems Sister     No Known Problems Sister     No Known Problems Brother     No Known Problems Brother     No Known Problems Brother          SOCIAL HISTORY:   Social History     Socioeconomic History    Marital status: Single     Spouse name: Not on file    Number of children: Not on file    Years of education: Not on file    Highest education level: Not on file   Occupational History  Not on file   Social Needs    Financial resource strain: Not on file    Food insecurity     Worry: Not on file     Inability: Not on file    Transportation needs     Medical: Not on file     Non-medical: Not on file   Tobacco Use    Smoking status: Never Smoker    Smokeless tobacco: Never Used   Substance and Sexual Activity    Alcohol use: No    Drug use: No    Sexual activity: Yes     Partners: Female     Birth control/protection: Condom   Lifestyle    Physical activity     Days per week: Not on file     Minutes per session: Not on file    Stress: Not on file   Relationships    Social connections     Talks on phone: Not on file     Gets together: Not on file     Attends Yazdanism service: Not on file     Active member of club or organization: Not on file     Attends meetings of clubs or organizations: Not on file     Relationship status: Not on file    Intimate partner violence     Fear of current or ex partner: Not on file     Emotionally abused: Not on file     Physically abused: Not on file     Forced sexual activity: Not on file   Other Topics Concern    Not on file   Social History Narrative    Not on file       REVIEW OF SYSTEMS: A 12-point review of systemswas obtained and pertinent positives and negatives were enumerated above in the history of present illness. All other reviewed systems / symptoms were negative. Review of Systems   Constitutional: Negative. Negative for appetite change, fatigue and unexpected weight change. HENT: Negative. Negative for dental problem, sore throat, trouble swallowing and voice change. Eyes: Negative. Negative for visual disturbance. Respiratory: Negative. Negative for cough, choking and shortness of breath. Cardiovascular: Positive for leg swelling (left leg swallowing and pain). Negative for chest pain. Gastrointestinal: Positive for abdominal distention, abdominal pain, constipation and diarrhea. Negative for anal bleeding, blood in stool, nausea, rectal pain and vomiting. Genitourinary: Positive for frequency. Negative for difficulty urinating. Musculoskeletal: Positive for myalgias. Negative for back pain and joint swelling. Neurological: Negative. Negative for dizziness, tremors, weakness, light-headedness, numbness and headaches. Hematological: Negative. Does not bruise/bleed easily. Psychiatric/Behavioral: Positive for sleep disturbance. The patient is not nervous/anxious. PHYSICAL EXAMINATION: Vital signs reviewed per the nursing documentation. /84   Pulse 80   Temp 97.3 °F (36.3 °C)   Ht 5' 8\" (1.727 m)   Wt 238 lb 1.6 oz (108 kg)   SpO2 97%   BMI 36.20 kg/m²   Body mass index is 36.2 kg/m². Physical Exam  Constitutional:       Appearance: Normal appearance. Eyes:      General: No scleral icterus. Pupils: Pupils are equal, round, and reactive to light. Cardiovascular:      Rate and Rhythm: Normal rate and regular rhythm. Heart sounds: Normal heart sounds. Pulmonary:      Effort: Pulmonary effort is normal.      Breath sounds: Normal breath sounds. Abdominal:      General: Bowel sounds are normal. There is no distension. Palpations: Abdomen is soft. There is no mass. Tenderness: There is no abdominal tenderness. There is no guarding. Genitourinary:     Rectum: Guaiac result negative. Skin:     General: Skin is warm and dry. Coloration: Skin is not jaundiced. Neurological:      Mental Status: He is alert and oriented to person, place, and time. Mental status is at baseline.            LABORATORY DATA: Reviewed  Lab Results   Component Value Date    WBC 6.7 01/20/2021    HGB 14.7 01/20/2021    HCT 43.4 01/20/2021    MCV 86.1 01/20/2021     01/20/2021     (L) 01/20/2021    K 4.0 01/20/2021    CL 98 01/20/2021 [facility]  Vascular Lower Extremities Arterial Duplex Procedure   Patient Name   Austen Chisholm       Date of Study           02/20/2021                 Shawn Smith   Date of Birth  1960   Gender                  Male   Age            64 year(s)   Race                    Other   Room Number    OP   Corporate ID # [id number]   Patient Acct # [de-identified]   MR #           [id number]       Sonographer             Tin Fish   Accession #    [id number]   Interpreting Physician  Vinnie Real   Referring      Kenroy Kapadia        Referring Physician  Nurse          Myrtel Edmonds,  Practitioner   CNP  Procedure Type of Study:   Extremities Arteries: Lower Extremities Arterial Duplex, Arterial Scan  Lower Left. Patient Status:Out Patient. Technical Quality:Adequate visualization. Comments: Indications: Decreased circulation. Conclusions   Summary   Normal right lower extremity arterial doppler ultrasound. Signature   ----------------------------------------------------------------  Electronically signed by Tin Fish(Sonographer) on  02/20/2021 12:15 PM  ----------------------------------------------------------------   ----------------------------------------------------------------  Electronically signed by Cathaleen Ponds Reyes,Arthur(Interpreting  physician) on 02/21/2021 05:30 PM  ----------------------------------------------------------------    Left Impression:    The arterial system in the left lower extremity was evaluated from the   groin to the feet utilizing real time gray scale imaging and duplex   doppler technique. There is no stenosis noted along the course of the main arteries in the   left leg. Velocities are measured in cm/s ; Diameters are measured in cm LE Duplex Measurements +---------++-----+-----+-----+----+-----++----+-----+---------+----+-------+ ! ! !Right! ! Left !    !     !!    !     !         !    !       ! +---------++-----+-----+-----+----+-----++----+-----+---------+----+-------+ ! Location ! !PSV  ! Ratio! Wave !AP  ! Trans! !PSV ! Ratio! Wave     !AP  ! Trans  ! !         !!     ! !Desc. ! Diam!Diam !!    ! !Desc. ! Diam!Diam   ! +---------++-----+-----+-----+----+-----++----+-----+---------+----+-------+ ! Dist EIA !!     !     !     !    !     !!191 ! ! Triphasic!1.02!1.1    ! +---------++-----+-----+-----+----+-----++----+-----+---------+----+-------+ ! Common   !!     !     !     !    !     !!187 !0.98 ! Triphasic!0.99!1.01   ! ! Femoral  !!     !     !     !    !     !!    !     !         !    !       ! +---------++-----+-----+-----+----+-----++----+-----+---------+----+-------+ ! PFA      !!     !     !     !    !     !!105 !0.56 ! Triphasic!0.67!0.8    ! +---------++-----+-----+-----+----+-----++----+-----+---------+----+-------+ ! Prox SFA !!     !     !     !    !     !!108 ! ! Triphasic!0.7 !0.78   ! +---------++-----+-----+-----+----+-----++----+-----+---------+----+-------+ ! Mid SFA  !!     !     !     !    !     !!119 !1.1  ! Triphasic!0.63!0.63   ! +---------++-----+-----+-----+----+-----++----+-----+---------+----+-------+ ! Dist SFA !!     !     !     !    !     !!85.7!0.72 ! Triphasic!0.74!0.71   ! +---------++-----+-----+-----+----+-----++----+-----+---------+----+-------+ ! Prox     !!     !     !     !    !     !!62.4!0.73 ! Triphasic!0.63!0.67   ! ! Popliteal!!     !     !     !    !     !!    !     !         !    !       ! +---------++-----+-----+-----+----+-----++----+-----+---------+----+-------+ ! Dist     !!     !     !     !    !     !!68.4!1.1  ! Triphasic!0.56!0.55   ! ! Popliteal!!     !     !     !    !     !!    !     !         !    !       ! +---------++-----+-----+-----+----+-----++----+-----+---------+----+-------+ ! Prox PTA !!     !     !     !    !     !!51.6!0.75 ! Triphasic!0.34!0.43   ! +---------++-----+-----+-----+----+-----++----+-----+---------+----+-------+ ! Dist PTA !!     !     ! !    !     !!54.8!1.06 ! Triphasic!0.24!0.3    ! +---------++-----+-----+-----+----+-----++----+-----+---------+----+-------+ ! Prox     !!     !     !     !    !     !!48.4!0.94 ! Triphasic!0.26!0.29   ! ! Peroneal !!     !     !     !    !     !!    !     !         !    !       ! +---------++-----+-----+-----+----+-----++----+-----+---------+----+-------+ ! Dist     !!     !     !     !    !     !!48.4!1    ! Triphasic!0.21!0.24   ! ! Peroneal !!     !     !     !    !     !!    !     !         !    !       ! +---------++-----+-----+-----+----+-----++----+-----+---------+----+-------+    Vl Lower Extremity Venous Left    Result Date: 2/21/2021 [facility]  Vascular Lower Extremities DVT Study Procedure   Patient Name   Amos Clements       Date of Study           02/20/2021                 Shawn Smith   Date of Birth  1960   Gender                  Male   Age            64 year(s)   Race                    Other   Room Number    OP   Corporate ID # [id number]   Patient Acct # [de-identified]   MR #           [id number]       Sonographer             Tin Fish   Accession #    [id number]   Interpreting Physician  Guzman Jonas   Referring      Inell Amari        Referring Physician  Nurse Wesley Quevedo,  Practitioner   CNP  Procedure Type of Study:   Veins: Lower Extremities DVT Study, Venous Scan Lower Left. Patient Status:Out Patient. Technical Quality:Adequate visualization. Comments: Indications: Decreased circulation. Conclusions   Summary   No evidence of superficial or deep venous thrombosis in the left lower  extremity. Signature   ----------------------------------------------------------------  Electronically signed by Tin Fish(Sonographer) on  02/20/2021 12:02 PM  ----------------------------------------------------------------   ----------------------------------------------------------------  Electronically signed by Kathlynn Pen Reyes,Arthur(Interpreting  physician) on 02/21/2021 05:29 PM  ----------------------------------------------------------------  Findings:   Right Impression:                Left Impression:   The common femoral vein          The common femoral, femoral, popliteal  demonstrates normal              and tibial veins demonstrate normal  compressibility and              compressibility and augmentation. augmentation. Normal compressibility of the great                                   saphenous vein. Normal compressibility of the small                                   saphenous vein.   Velocities are measured in cm/s ; Diameters are measured in cm Right Lower Extremities DVT Study Measurements Right 2D Measurements +------------------------------------+----------+---------------+----------+ ! Location                            ! Visualized! Compressibility! Thrombosis! +------------------------------------+----------+---------------+----------+ ! Common Femoral                      !Yes       ! Yes            ! None      ! +------------------------------------+----------+---------------+----------+ Right Doppler Measurements +----------------------------+------+------+-------------------------------+ ! Location                    ! Signal!Reflux! Reflux (msec)                  ! +----------------------------+------+------+-------------------------------+ ! Common Femoral              !Phasic!      !                               ! +----------------------------+------+------+-------------------------------+ Left Lower Extremities DVT Study Measurements Left 2D Measurements +------------------------------------+----------+---------------+----------+ ! Location                            ! Visualized! Compressibility! Thrombosis! +------------------------------------+----------+---------------+----------+ ! Common Femoral                      !Yes       ! Yes            ! None      ! +------------------------------------+----------+---------------+----------+ ! Prox Femoral                        !Yes       ! Yes            ! None      ! +------------------------------------+----------+---------------+----------+ ! Mid Femoral                         !Yes       ! Yes            ! None      ! +------------------------------------+----------+---------------+----------+ ! Dist Femoral                        !Yes       ! Yes            ! None      ! +------------------------------------+----------+---------------+----------+ ! Deep Femoral                        !No        !               !          ! +------------------------------------+----------+---------------+----------+ ! Popliteal !Yes       !Yes            ! None      ! +------------------------------------+----------+---------------+----------+ ! Sapheno Femoral Junction            ! Yes       ! Yes            ! None      ! +------------------------------------+----------+---------------+----------+ ! PTV                                 ! Yes       ! Yes            ! None      ! +------------------------------------+----------+---------------+----------+ ! Peroneal                            !Yes       ! Yes            ! None      ! +------------------------------------+----------+---------------+----------+ ! Gastroc                             ! Yes       ! Yes            ! None      ! +------------------------------------+----------+---------------+----------+ ! GSV Thigh                           ! Yes       ! Yes            ! None      ! +------------------------------------+----------+---------------+----------+ ! GSV Knee                            ! Yes       ! Yes            ! None      ! +------------------------------------+----------+---------------+----------+ ! GSV Ankle                           ! Yes       ! Yes            ! None      ! +------------------------------------+----------+---------------+----------+ ! SSV                                 ! Yes       ! Yes            ! None      ! +------------------------------------+----------+---------------+----------+ Left Doppler Measurements +---------------------------+------+------+--------------------------------+ ! Location                   ! Signal!Reflux! Reflux (msec)                   ! +---------------------------+------+------+--------------------------------+ ! Common Femoral             !Phasic!      !                                ! +---------------------------+------+------+--------------------------------+ ! Prox Femoral               !Phasic!      !                                ! +---------------------------+------+------+--------------------------------+ ! Popliteal

## 2021-04-16 ENCOUNTER — OFFICE VISIT (OUTPATIENT)
Dept: UROLOGY | Age: 61
End: 2021-04-16
Payer: MEDICAID

## 2021-04-16 ENCOUNTER — HOSPITAL ENCOUNTER (OUTPATIENT)
Age: 61
Discharge: HOME | End: 2021-04-16
Payer: MEDICAID

## 2021-04-16 VITALS
HEART RATE: 68 BPM | HEIGHT: 68 IN | WEIGHT: 236 LBS | BODY MASS INDEX: 35.77 KG/M2 | SYSTOLIC BLOOD PRESSURE: 130 MMHG | DIASTOLIC BLOOD PRESSURE: 78 MMHG

## 2021-04-16 DIAGNOSIS — N47.1 PHIMOSIS: ICD-10-CM

## 2021-04-16 DIAGNOSIS — N52.9 ERECTILE DYSFUNCTION, UNSPECIFIED ERECTILE DYSFUNCTION TYPE: ICD-10-CM

## 2021-04-16 DIAGNOSIS — N39.41 URGE INCONTINENCE OF URINE: ICD-10-CM

## 2021-04-16 DIAGNOSIS — K43.2 INCISIONAL HERNIA, WITHOUT OBSTRUCTION OR GANGRENE: ICD-10-CM

## 2021-04-16 DIAGNOSIS — C61 PROSTATE CANCER (HCC): ICD-10-CM

## 2021-04-16 DIAGNOSIS — C61 PROSTATE CANCER (HCC): Primary | ICD-10-CM

## 2021-04-16 LAB
BILIRUBIN, POC: ABNORMAL
BLOOD URINE, POC: ABNORMAL
CLARITY, POC: CLEAR
COLOR, POC: YELLOW
GLUCOSE URINE, POC: ABNORMAL
KETONES, POC: ABNORMAL
LEUKOCYTE EST, POC: ABNORMAL
NITRITE, POC: ABNORMAL
PH, POC: 7
PROSTATE SPECIFIC ANTIGEN: <0.01 UG/L
PROTEIN, POC: ABNORMAL
SPECIFIC GRAVITY, POC: 1.02
UROBILINOGEN, POC: 0.2

## 2021-04-16 PROCEDURE — 81003 URINALYSIS AUTO W/O SCOPE: CPT | Performed by: UROLOGY

## 2021-04-16 PROCEDURE — 99214 OFFICE O/P EST MOD 30 MIN: CPT | Performed by: UROLOGY

## 2021-04-16 PROCEDURE — G8417 CALC BMI ABV UP PARAM F/U: HCPCS | Performed by: UROLOGY

## 2021-04-16 PROCEDURE — G8427 DOCREV CUR MEDS BY ELIG CLIN: HCPCS | Performed by: UROLOGY

## 2021-04-16 PROCEDURE — 36415 COLL VENOUS BLD VENIPUNCTURE: CPT

## 2021-04-16 PROCEDURE — 84153 ASSAY OF PSA TOTAL: CPT

## 2021-04-16 PROCEDURE — 3017F COLORECTAL CA SCREEN DOC REV: CPT | Performed by: UROLOGY

## 2021-04-16 PROCEDURE — 1036F TOBACCO NON-USER: CPT | Performed by: UROLOGY

## 2021-04-16 NOTE — PROGRESS NOTES
Alvaro Mccartney MD        [facility] PHYSICIANS  [facility] [address]  [address] [facility] [address]  [place] [address] [address] [facility] [address]  Dept: [phone]  Dept Fax: [phone] [facility] Urology Office Note -     Patient:  Marleen Vasquez  YOB: 1960  Date: 4/16/2021    The patient is a 64 y.o. male who presents today for evaluation of the following problems:   Chief Complaint   Patient presents with    6 Month Follow-Up    referred/consultation requested by CARMELA Stiles.     HISTORY OF PRESENT ILLNESS:     Prostate cancer  S/p ralp  Recent PSA undetectable      Secondary Diagnosis:      JUSTIN  improving      ED  Worsening  Sildenafil not helping  Discussed prosthesis        Phimosis  S/p circ            Requested/reviewed records from CARMELA Stiles office and/or outside [de-identified]    (Patient's old records have been requested, reviewed and pertinent findings summarized in today's note.)    Last several PSA's:  Lab Results   Component Value Date    PSA <0.01 04/16/2021    PSA <0.01 10/08/2020    PSA <0.01 09/30/2020       Last total testosterone:  No results found for: TESTOSTERONE    Urinalysis today:  Results for POC orders placed in visit on 04/16/21   POCT Urinalysis No Micro (Auto)   Result Value Ref Range    Color, UA yellow     Clarity, UA clear     Glucose, UA POC neg     Bilirubin, UA small (A)     Ketones, UA neg     Spec Grav, UA 1.025     Blood, UA POC neg     pH, UA 7.0     Protein, UA POC neg     Urobilinogen, UA 0.2     Leukocytes, UA neg     Nitrite, UA neg        Last BUN and creatinine:  Lab Results   Component Value Date    BUN 16 01/20/2021     Lab Results   Component Value Date    CREATININE 0.79 01/20/2021         Imaging Reviewed during this Office Visit:   Alvaro Mccartney MD independently reviewed the images and verified the radiology reports from:    Ct Sinus Wo Contrast    Result Date: 10/9/2019  EXAMINATION: CT OF THE SINUS WITHOUT CONTRAST 10/9/2019 4:02 pm TECHNIQUE: CT of the sinuses was performed without the administration of intravenous contrast. Multiplanar reformatted images are provided for review. Dose modulation, iterative reconstruction, and/or weight based adjustment of the mA/kV was utilized to reduce the radiation dose to as low as reasonably achievable. COMPARISON: None HISTORY: ORDERING SYSTEM PROVIDED HISTORY: Chronic maxillary sinusitis FINDINGS: SINUSES/MASTOIDS:  Limited coronal imaging of the paranasal sinuses demonstrates multifocal mucosal thickening. No definitive air-fluid levels are noted. Leftward deviation of the nasal septum is noted. SOFT TISSUES:  Soft tissues are markedly limited. Exam is presented in the coronal plane only and in the bone algorithm only.      Minimal mucosal thickening is noted No definitive air-fluid level is noted       PAST MEDICAL, FAMILY AND SOCIAL HISTORY:  Past Medical History:   Diagnosis Date    Cancer [facility]) 2019    prostate    Hematuria     Hypertension     Intermittent abdominal pain     Nerve injury     left leg , states  s/p prostatectomy    DILAN (obstructive sleep apnea)     DOES NOT HAVE MACHINE    Prediabetes      Past Surgical History:   Procedure Laterality Date    APPENDECTOMY      CIRCUMCISION N/A 7/10/2020    CIRCUMCISION performed by Nell Marie MD at [address] N/A 2/1/2021    COLONOSCOPY POLYPECTOMY HOT BIOPSY performed by Kelsey Neves MD at [place] [address] N/A 2/7/2019    CYSTOSCOPY performed by Nell Marie MD at [address] [facility] N/A 5/28/2020    CYSTOSCOPY performed by Nell Marie MD at [address] [facility] 2004    [address] [facility] N/A 3/21/2019    PROSTATE BIOPSY, ULTRASOUND  (OFFICE TO CONTACT [place] performed by Nell Marie MD at [facility] 5/31/2019    [address], PELVIC LYMPH NODE DISSECTION performed by Nell Marie MD at [address] [place] [address] Family History   Problem Relation Age of Onset    High Blood Pressure Mother     No Known Problems Father     Lupus Sister     High Blood Pressure Sister     No Known Problems Brother     No Known Problems Maternal Grandmother     No Known Problems Maternal Grandfather     No Known Problems Paternal Grandmother     No Known Problems Paternal Grandfather     No Known Problems Sister     No Known Problems Sister     No Known Problems Sister     No Known Problems Brother     No Known Problems Brother     No Known Problems Brother      Outpatient Medications Marked as Taking for the 4/16/21 encounter (Office Visit) with Dhaval Phelps MD   Medication Sig Dispense Refill    vitamin D (ERGOCALCIFEROL) 1.25 MG (85768 UT) CAPS capsule TK ONE CAPSULE PO ONCE A WEEK      vitamin C (ASCORBIC ACID) 500 MG tablet Take 500 mg by mouth daily      Multiple Vitamins-Minerals (THERAPEUTIC MULTIVITAMIN-MINERALS) tablet Take 1 tablet by mouth daily      lisinopril-hydroCHLOROthiazide (PRINZIDE;ZESTORETIC) 20-12.5 MG per tablet Take 1 tablet by mouth daily         Patient has no known allergies. Social History     Tobacco Use   Smoking Status Never Smoker   Smokeless Tobacco Never Used      (If patient a smoker, smoking cessation counseling offered)   Social History     Substance and Sexual Activity   Alcohol Use No       REVIEW OF SYSTEMS:  Constitutional: negative  Eyes: negative  Respiratory: negative  Cardiovascular: negative  Gastrointestinal: negative  Genitourinary: see HPI  Musculoskeletal: negative  Skin: negative   Neurological: negative  Hematological/Lymphatic: negative  Psychological: negative      Physical Exam:    This a 64 y.o. male  Vitals:    04/16/21 1029   BP: 130/78   Pulse: 68     Body mass index is 35.88 kg/m². Constitutional: Patient in no acute distress;       Assessment and Plan        1. Prostate cancer ([address]    2. Urge incontinence of urine    3.  Incisional hernia, without obstruction or gangrene    4. Phimosis    5. Erectile dysfunction, unspecified erectile dysfunction type               Plan:       neuropathic pain after surgery has improved (leg pain/numbness)  Doing well after circumcision- is complaining of weight gain and buried penis. Recommend weight loss  Voiding w minimal incontinence  Needs new PSA today  ED- worsening. trimix ordered for patient. Pt may elect for prosthesis. I discussed that length IS NOT improved with this surgery  Incisional hernia- new problem. At location of prostate extraction site. Not incarcerated. Uncomforable. needs general surgery consult      Follow up in one month for trimix injection      Prescriptions Ordered:  No orders of the defined types were placed in this encounter.      Orders Placed:  Orders Placed This Encounter   Procedures    PSA, Diagnostic     Standing Status:   Future     Number of Occurrences:   1     Standing Expiration Date:   4/16/2022    External Referral To General Surgery     Referral Priority:   Routine     Referral Type:   Eval and Treat     Referral Reason:   Specialty Services Required     Requested Specialty:   General Surgery     Number of Visits Requested:   1    POCT Urinalysis No Micro (Auto)

## 2021-05-21 ENCOUNTER — OFFICE VISIT (OUTPATIENT)
Dept: UROLOGY | Age: 61
End: 2021-05-21
Payer: MEDICAID

## 2021-05-21 VITALS
HEIGHT: 68 IN | HEART RATE: 70 BPM | SYSTOLIC BLOOD PRESSURE: 123 MMHG | WEIGHT: 236 LBS | BODY MASS INDEX: 35.77 KG/M2 | DIASTOLIC BLOOD PRESSURE: 70 MMHG

## 2021-05-21 DIAGNOSIS — K43.2 INCISIONAL HERNIA, WITHOUT OBSTRUCTION OR GANGRENE: ICD-10-CM

## 2021-05-21 DIAGNOSIS — C61 PROSTATE CANCER (HCC): Primary | ICD-10-CM

## 2021-05-21 DIAGNOSIS — N39.3 STRESS INCONTINENCE: ICD-10-CM

## 2021-05-21 DIAGNOSIS — N52.9 ERECTILE DYSFUNCTION, UNSPECIFIED ERECTILE DYSFUNCTION TYPE: ICD-10-CM

## 2021-05-21 PROCEDURE — 3017F COLORECTAL CA SCREEN DOC REV: CPT | Performed by: UROLOGY

## 2021-05-21 PROCEDURE — 1036F TOBACCO NON-USER: CPT | Performed by: UROLOGY

## 2021-05-21 PROCEDURE — 99212 OFFICE O/P EST SF 10 MIN: CPT

## 2021-05-21 PROCEDURE — G8427 DOCREV CUR MEDS BY ELIG CLIN: HCPCS | Performed by: UROLOGY

## 2021-05-21 PROCEDURE — G8417 CALC BMI ABV UP PARAM F/U: HCPCS | Performed by: UROLOGY

## 2021-05-21 PROCEDURE — 99213 OFFICE O/P EST LOW 20 MIN: CPT | Performed by: UROLOGY

## 2021-05-21 NOTE — PROGRESS NOTES
Dashawn Gardiner MD        [facility] PHYSICIANS  [facility] [address]  [address] [address]  Dept: [phone]  Dept Fax: [phone] [facility] Urology Office Note -     Patient:  Angela Bell  YOB: 1960  Date: 5/21/2021    The patient is a 64 y.o. male who presents today for evaluation of the following problems:   Chief Complaint   Patient presents with    Follow-up    referred/consultation requested by CARMELA Stack. HISTORY OF PRESENT ILLNESS:     Prostate cancer  S/p ralp  Recent PSA undetectable      Secondary Diagnosis:      JUSTIN  improving      ED  Worsening  Sildenafil not helping  Discussed prosthesis        Phimosis  S/p circ            Requested/reviewed records from CARMELA Stack office and/or outside physician/EMR    (Patient's old records have been requested, reviewed and pertinent findings summarized in today's note.)    Last several PSA's:  Lab Results   Component Value Date    PSA <0.01 04/16/2021    PSA <0.01 10/08/2020    PSA <0.01 09/30/2020       Last total testosterone:  No results found for: TESTOSTERONE    Urinalysis today:  No results found for this visit on 05/21/21. Last BUN and creatinine:  Lab Results   Component Value Date    BUN 16 01/20/2021     Lab Results   Component Value Date    CREATININE 0.79 01/20/2021         Imaging Reviewed during this Office Visit:   Dashawn Gardiner MD independently reviewed the images and verified the radiology reports from:    Ct Sinus Wo Contrast    Result Date: 10/9/2019  EXAMINATION: CT OF THE SINUS WITHOUT CONTRAST  10/9/2019 4:02 pm TECHNIQUE: CT of the sinuses was performed without the administration of intravenous contrast. Multiplanar reformatted images are provided for review. Dose modulation, iterative reconstruction, and/or weight based adjustment of the mA/kV was utilized to reduce the radiation dose to as low as reasonably achievable.  COMPARISON: None HISTORY: ORDERING SYSTEM PROVIDED HISTORY: Chronic maxillary sinusitis FINDINGS: SINUSES/MASTOIDS:  Limited coronal imaging of the paranasal sinuses demonstrates multifocal mucosal thickening. No definitive air-fluid levels are noted. Leftward deviation of the nasal septum is noted. SOFT TISSUES:  Soft tissues are markedly limited. Exam is presented in the coronal plane only and in the bone algorithm only.      Minimal mucosal thickening is noted No definitive air-fluid level is noted       PAST MEDICAL, FAMILY AND SOCIAL HISTORY:  Past Medical History:   Diagnosis Date    Cancer [facility]) 2019    prostate    Hematuria     Hypertension     Intermittent abdominal pain     Nerve injury     left leg , states  s/p prostatectomy    DILAN (obstructive sleep apnea)     DOES NOT HAVE MACHINE    Prediabetes      Past Surgical History:   Procedure Laterality Date    APPENDECTOMY      CIRCUMCISION N/A 7/10/2020    CIRCUMCISION performed by Boni Fleming MD at [address] [facility] COLONOSCOPY N/A 2/1/2021    COLONOSCOPY POLYPECTOMY HOT BIOPSY performed by Archana Gallagher MD at [place] [address] N/A 2/7/2019    CYSTOSCOPY performed by Boni Fleming MD at [address] [facility] N/A 5/28/2020    CYSTOSCOPY performed by Boni Fleming MD at [address] [facility] 2004    NECK    PROSTATE BIOPSY N/A 3/21/2019    PROSTATE BIOPSY, ULTRASOUND  (OFFICE TO CONTACT [place] performed by Boni Fleming MD at [facility] 5/31/2019    [address], PELVIC LYMPH NODE DISSECTION performed by Boni Fleming MD at [address] [place] [address]       Family History   Problem Relation Age of Onset    High Blood Pressure Mother     No Known Problems Father     Lupus Sister     High Blood Pressure Sister     No Known Problems Brother     No Known Problems Maternal Grandmother     No Known Problems Maternal Grandfather     No Known Problems Paternal Grandmother     No Known Problems Paternal Grandfather     No Known Problems Sister     No Known Problems Sister     No Known Problems Sister     No Known Problems Brother     No Known Problems Brother     No Known Problems Brother      Outpatient Medications Marked as Taking for the 5/21/21 encounter (Office Visit) with Demarcus Alcocer MD   Medication Sig Dispense Refill    vitamin C (ASCORBIC ACID) 500 MG tablet Take 500 mg by mouth daily      Multiple Vitamins-Minerals (THERAPEUTIC MULTIVITAMIN-MINERALS) tablet Take 1 tablet by mouth daily      lisinopril-hydroCHLOROthiazide (PRINZIDE;ZESTORETIC) 20-12.5 MG per tablet Take 1 tablet by mouth daily         Patient has no known allergies. Social History     Tobacco Use   Smoking Status Never Smoker   Smokeless Tobacco Never Used      (If patient a smoker, smoking cessation counseling offered)   Social History     Substance and Sexual Activity   Alcohol Use No       REVIEW OF SYSTEMS:  Constitutional: negative  Eyes: negative  Respiratory: negative  Cardiovascular: negative  Gastrointestinal: negative  Genitourinary: see HPI  Musculoskeletal: negative  Skin: negative   Neurological: negative  Hematological/Lymphatic: negative  Psychological: negative      Physical Exam:    This a 64 y.o. male  Vitals:    05/21/21 1042   BP: 123/70   Pulse: 70     Body mass index is 35.88 kg/m². Constitutional: Patient in no acute distress;       Assessment and Plan        1. Prostate cancer ([address]    2. Erectile dysfunction, unspecified erectile dysfunction type    3. Stress incontinence    4. Incisional hernia, without obstruction or gangrene               Plan:       neuropathic pain after surgery has improved (leg pain/numbness)  Doing well after circumcision- is complaining of weight gain and buried penis. Recommend weight loss  Voiding w minimal incontinence  psa normal  ED- worsening. trimix ordered for patient. Pt may elect for prosthesis. I discussed that length IS NOT improved with this surgery.  Will follow up for trimix injections. I resent this. Incisional hernia- new problem. At location of prostate extraction site. Not incarcerated. Uncomforable. needs general surgery consult. Sent this again. Follow up in one month for trimix injection      Prescriptions Ordered:  No orders of the defined types were placed in this encounter. Orders Placed:  No orders of the defined types were placed in this encounter.

## 2021-05-24 ENCOUNTER — TELEPHONE (OUTPATIENT)
Dept: UROLOGY | Age: 61
End: 2021-05-24

## 2021-05-24 NOTE — TELEPHONE ENCOUNTER
----- Message from Dacia Cagle MD sent at 5/22/2021 10:40 PM EDT -----  Pt needs trimix injection for ED. This is a medication that can only be given at [address] [place] [address] in [place]. We have a paper that needs to be filled. Maybe the next doctor can fill it in clinic for me?  The patient will then need to come in the office with any of the doctors to be taught how to do the injection    Barbara Goff MD

## 2021-06-07 ENCOUNTER — HOSPITAL ENCOUNTER (OUTPATIENT)
Age: 61
Discharge: HOME | End: 2021-06-07
Payer: MEDICAID

## 2021-06-07 LAB — VITAMIN D 25-HYDROXY: 22.6 NG/ML (ref 30–100)

## 2021-06-07 PROCEDURE — 36415 COLL VENOUS BLD VENIPUNCTURE: CPT

## 2021-06-07 PROCEDURE — 82306 VITAMIN D 25 HYDROXY: CPT

## 2021-06-23 ENCOUNTER — OFFICE VISIT (OUTPATIENT)
Dept: SURGERY | Age: 61
End: 2021-06-23
Payer: MEDICAID

## 2021-06-23 VITALS
SYSTOLIC BLOOD PRESSURE: 130 MMHG | TEMPERATURE: 98 F | WEIGHT: 232 LBS | DIASTOLIC BLOOD PRESSURE: 70 MMHG | BODY MASS INDEX: 35.28 KG/M2 | HEART RATE: 66 BPM

## 2021-06-23 DIAGNOSIS — K43.2 INCISIONAL HERNIA, WITHOUT OBSTRUCTION OR GANGRENE: Primary | ICD-10-CM

## 2021-06-23 PROCEDURE — 3017F COLORECTAL CA SCREEN DOC REV: CPT | Performed by: SURGERY

## 2021-06-23 PROCEDURE — G8417 CALC BMI ABV UP PARAM F/U: HCPCS | Performed by: SURGERY

## 2021-06-23 PROCEDURE — 1036F TOBACCO NON-USER: CPT | Performed by: SURGERY

## 2021-06-23 PROCEDURE — G8427 DOCREV CUR MEDS BY ELIG CLIN: HCPCS | Performed by: SURGERY

## 2021-06-23 PROCEDURE — 99202 OFFICE O/P NEW SF 15 MIN: CPT | Performed by: SURGERY

## 2021-06-23 NOTE — PROGRESS NOTES
Meetings:     Marital Status:    Intimate Partner Violence:     Fear of Current or Ex-Partner:     Emotionally Abused:     Physically Abused:     Sexually Abused:        ROS:   GEN: Denies recent weight loss, fatigue, fevers, chills. HEENT: No rhinorrhea, dysphagia, odynphagia. CV: No history of MI, recent chest pain. RESP: Denies shortness of breath, COPD, asthma. GI: per hpi  : Denies increased frequency or dysuria. HEM[de-identified] Denies history of anemia or DVTs. ENDO: Denies history of thyroid problems or diabetes. NEURO: Denies history of CVA, TIA. Physical Exam:  There were no vitals filed for this visit. General:A & O x3  HEENT:  NCAT, PERRL, EMOI, oral mucus membrane pink and moist, no mass palpated on neck exam  BREAST:Deferred  Heart: S1S2, no mumurs, RRR  Lungs: equal chest rise and fall  Abdomen: soft, nontender, no guarding, no rebound, midline supraumbilical incisional hernia with easily reducible contents  RECTAL: deferred   Extremity: Normal, without deformities, edema, or skin discoloration  SKIN: Skin color, texture, turgor normal. No rashes or lesions. Neuro: No motor or sensory deficits appreciated. MK:normal throughout upper and lower extremities    Assessment      Diagnosis Orders   1. Incisional hernia, without obstruction or gangrene         Plan   1. Consent obtained for robotic ventral hernia repair, witnessed    2. Will see post op in clinic     Pt discussed with Dr. Qian Espitia     Thank you,  Chetna Maxwell DO  6/23/2021    Attending Physician Statement  I have discussed the case with Dr Katelyn Lloyd, including pertinent history and exam findings with the resident. I have seen and examined the patient and the key elements of the encounter have been performed by me. I agree with the assessment, plan and orders as documented by the resident.       Electronically signed by Latisha Diaz IV, DO  on 6/23/2021 at 11:38 AM

## 2021-06-24 ENCOUNTER — ANESTHESIA EVENT (OUTPATIENT)
Dept: OPERATING ROOM | Age: 61
End: 2021-06-24
Payer: MEDICAID

## 2021-06-25 ENCOUNTER — ANESTHESIA (OUTPATIENT)
Dept: OPERATING ROOM | Age: 61
End: 2021-06-25
Payer: MEDICAID

## 2021-06-25 ENCOUNTER — HOSPITAL ENCOUNTER (OUTPATIENT)
Age: 61
Setting detail: OUTPATIENT SURGERY
Discharge: HOME | End: 2021-06-25
Attending: SURGERY | Admitting: SURGERY
Payer: MEDICAID

## 2021-06-25 VITALS — OXYGEN SATURATION: 100 % | TEMPERATURE: 97 F | SYSTOLIC BLOOD PRESSURE: 112 MMHG | DIASTOLIC BLOOD PRESSURE: 59 MMHG

## 2021-06-25 VITALS
SYSTOLIC BLOOD PRESSURE: 119 MMHG | RESPIRATION RATE: 12 BRPM | HEART RATE: 68 BPM | TEMPERATURE: 96.8 F | DIASTOLIC BLOOD PRESSURE: 68 MMHG | OXYGEN SATURATION: 95 %

## 2021-06-25 DIAGNOSIS — G89.18 ACUTE POST-OPERATIVE PAIN: ICD-10-CM

## 2021-06-25 DIAGNOSIS — Z98.890 S/P REPAIR OF VENTRAL HERNIA: Primary | ICD-10-CM

## 2021-06-25 DIAGNOSIS — Z87.19 S/P REPAIR OF VENTRAL HERNIA: Primary | ICD-10-CM

## 2021-06-25 LAB — GLUCOSE BLD-MCNC: 120 MG/DL (ref 75–110)

## 2021-06-25 PROCEDURE — 3700000001 HC ADD 15 MINUTES (ANESTHESIA): Performed by: SURGERY

## 2021-06-25 PROCEDURE — 2580000003 HC RX 258: Performed by: SURGERY

## 2021-06-25 PROCEDURE — 82947 ASSAY GLUCOSE BLOOD QUANT: CPT

## 2021-06-25 PROCEDURE — 2500000003 HC RX 250 WO HCPCS: Performed by: NURSE ANESTHETIST, CERTIFIED REGISTERED

## 2021-06-25 PROCEDURE — 3600000009 HC SURGERY ROBOT BASE: Performed by: SURGERY

## 2021-06-25 PROCEDURE — 6370000000 HC RX 637 (ALT 250 FOR IP): Performed by: ANESTHESIOLOGY

## 2021-06-25 PROCEDURE — 6360000002 HC RX W HCPCS

## 2021-06-25 PROCEDURE — S2900 ROBOTIC SURGICAL SYSTEM: HCPCS | Performed by: SURGERY

## 2021-06-25 PROCEDURE — 6360000002 HC RX W HCPCS: Performed by: SURGERY

## 2021-06-25 PROCEDURE — 3600000019 HC SURGERY ROBOT ADDTL 15MIN: Performed by: SURGERY

## 2021-06-25 PROCEDURE — 3700000000 HC ANESTHESIA ATTENDED CARE: Performed by: SURGERY

## 2021-06-25 PROCEDURE — C1781 MESH (IMPLANTABLE): HCPCS | Performed by: SURGERY

## 2021-06-25 PROCEDURE — 6360000002 HC RX W HCPCS: Performed by: ANESTHESIOLOGY

## 2021-06-25 PROCEDURE — C1760 CLOSURE DEV, VASC: HCPCS | Performed by: SURGERY

## 2021-06-25 PROCEDURE — 7100000000 HC PACU RECOVERY - FIRST 15 MIN: Performed by: SURGERY

## 2021-06-25 PROCEDURE — 2500000003 HC RX 250 WO HCPCS: Performed by: ANESTHESIOLOGY

## 2021-06-25 PROCEDURE — 7100000001 HC PACU RECOVERY - ADDTL 15 MIN: Performed by: SURGERY

## 2021-06-25 PROCEDURE — 2709999900 HC NON-CHARGEABLE SUPPLY: Performed by: SURGERY

## 2021-06-25 PROCEDURE — 7100000040 HC SPAR PHASE II RECOVERY - FIRST 15 MIN: Performed by: SURGERY

## 2021-06-25 PROCEDURE — 64488 TAP BLOCK BI INJECTION: CPT | Performed by: ANESTHESIOLOGY

## 2021-06-25 PROCEDURE — 6360000002 HC RX W HCPCS: Performed by: NURSE ANESTHETIST, CERTIFIED REGISTERED

## 2021-06-25 DEVICE — MESH SURG DIA4.5IN CIR W/ ECHO 2 POS SYS VENTRALIGHT: Type: IMPLANTABLE DEVICE | Site: ABDOMEN | Status: FUNCTIONAL

## 2021-06-25 RX ORDER — SODIUM CHLORIDE, SODIUM LACTATE, POTASSIUM CHLORIDE, CALCIUM CHLORIDE 600; 310; 30; 20 MG/100ML; MG/100ML; MG/100ML; MG/100ML
INJECTION, SOLUTION INTRAVENOUS CONTINUOUS
Status: DISCONTINUED | OUTPATIENT
Start: 2021-06-25 | End: 2021-06-25 | Stop reason: HOSPADM

## 2021-06-25 RX ORDER — OXYCODONE HYDROCHLORIDE AND ACETAMINOPHEN 5; 325 MG/1; MG/1
1 TABLET ORAL EVERY 8 HOURS PRN
Qty: 15 TABLET | Refills: 0 | Status: SHIPPED | OUTPATIENT
Start: 2021-06-25 | End: 2021-06-30

## 2021-06-25 RX ORDER — SODIUM CHLORIDE, SODIUM LACTATE, POTASSIUM CHLORIDE, CALCIUM CHLORIDE 600; 310; 30; 20 MG/100ML; MG/100ML; MG/100ML; MG/100ML
INJECTION, SOLUTION INTRAVENOUS CONTINUOUS
Status: CANCELLED | OUTPATIENT
Start: 2021-06-25

## 2021-06-25 RX ORDER — FENTANYL CITRATE 50 UG/ML
INJECTION, SOLUTION INTRAMUSCULAR; INTRAVENOUS
Status: COMPLETED
Start: 2021-06-25 | End: 2021-06-25

## 2021-06-25 RX ORDER — FENTANYL CITRATE 50 UG/ML
25 INJECTION, SOLUTION INTRAMUSCULAR; INTRAVENOUS EVERY 5 MIN PRN
Status: DISCONTINUED | OUTPATIENT
Start: 2021-06-25 | End: 2021-06-25 | Stop reason: HOSPADM

## 2021-06-25 RX ORDER — MEPERIDINE HYDROCHLORIDE 50 MG/ML
12.5 INJECTION INTRAMUSCULAR; INTRAVENOUS; SUBCUTANEOUS EVERY 5 MIN PRN
Status: DISCONTINUED | OUTPATIENT
Start: 2021-06-25 | End: 2021-06-25 | Stop reason: HOSPADM

## 2021-06-25 RX ORDER — LIDOCAINE HYDROCHLORIDE 10 MG/ML
INJECTION, SOLUTION EPIDURAL; INFILTRATION; INTRACAUDAL; PERINEURAL PRN
Status: DISCONTINUED | OUTPATIENT
Start: 2021-06-25 | End: 2021-06-25 | Stop reason: SDUPTHER

## 2021-06-25 RX ORDER — OXYCODONE HYDROCHLORIDE AND ACETAMINOPHEN 5; 325 MG/1; MG/1
1 TABLET ORAL
Status: COMPLETED | OUTPATIENT
Start: 2021-06-25 | End: 2021-06-25

## 2021-06-25 RX ORDER — FENTANYL CITRATE 50 UG/ML
INJECTION, SOLUTION INTRAMUSCULAR; INTRAVENOUS PRN
Status: DISCONTINUED | OUTPATIENT
Start: 2021-06-25 | End: 2021-06-25 | Stop reason: SDUPTHER

## 2021-06-25 RX ORDER — FENTANYL CITRATE 50 UG/ML
25 INJECTION, SOLUTION INTRAMUSCULAR; INTRAVENOUS EVERY 5 MIN PRN
Status: CANCELLED | OUTPATIENT
Start: 2021-06-25

## 2021-06-25 RX ORDER — CYCLOBENZAPRINE HCL 10 MG
10 TABLET ORAL 3 TIMES DAILY PRN
Qty: 30 TABLET | Refills: 3 | Status: SHIPPED | OUTPATIENT
Start: 2021-06-25 | End: 2021-07-05

## 2021-06-25 RX ORDER — PROPOFOL 10 MG/ML
INJECTION, EMULSION INTRAVENOUS PRN
Status: DISCONTINUED | OUTPATIENT
Start: 2021-06-25 | End: 2021-06-25 | Stop reason: SDUPTHER

## 2021-06-25 RX ORDER — BUPIVACAINE HYDROCHLORIDE 2.5 MG/ML
INJECTION, SOLUTION EPIDURAL; INFILTRATION; INTRACAUDAL
Status: COMPLETED | OUTPATIENT
Start: 2021-06-25 | End: 2021-06-25

## 2021-06-25 RX ORDER — MIDAZOLAM HYDROCHLORIDE 1 MG/ML
INJECTION INTRAMUSCULAR; INTRAVENOUS
Status: COMPLETED
Start: 2021-06-25 | End: 2021-06-25

## 2021-06-25 RX ORDER — DOCUSATE SODIUM 100 MG/1
100 CAPSULE, LIQUID FILLED ORAL DAILY PRN
Qty: 30 CAPSULE | Refills: 1 | Status: SHIPPED | OUTPATIENT
Start: 2021-06-25

## 2021-06-25 RX ORDER — FENTANYL CITRATE 50 UG/ML
50 INJECTION, SOLUTION INTRAMUSCULAR; INTRAVENOUS EVERY 5 MIN PRN
Status: DISCONTINUED | OUTPATIENT
Start: 2021-06-25 | End: 2021-06-25 | Stop reason: HOSPADM

## 2021-06-25 RX ORDER — DEXAMETHASONE SODIUM PHOSPHATE 10 MG/ML
INJECTION INTRAMUSCULAR; INTRAVENOUS PRN
Status: DISCONTINUED | OUTPATIENT
Start: 2021-06-25 | End: 2021-06-25 | Stop reason: SDUPTHER

## 2021-06-25 RX ORDER — ROCURONIUM BROMIDE 10 MG/ML
INJECTION, SOLUTION INTRAVENOUS PRN
Status: DISCONTINUED | OUTPATIENT
Start: 2021-06-25 | End: 2021-06-25 | Stop reason: SDUPTHER

## 2021-06-25 RX ORDER — ONDANSETRON 2 MG/ML
4 INJECTION INTRAMUSCULAR; INTRAVENOUS
Status: DISCONTINUED | OUTPATIENT
Start: 2021-06-25 | End: 2021-06-25 | Stop reason: HOSPADM

## 2021-06-25 RX ORDER — NEOSTIGMINE METHYLSULFATE 5 MG/5 ML
SYRINGE (ML) INTRAVENOUS PRN
Status: DISCONTINUED | OUTPATIENT
Start: 2021-06-25 | End: 2021-06-25 | Stop reason: SDUPTHER

## 2021-06-25 RX ORDER — MIDAZOLAM HYDROCHLORIDE 1 MG/ML
INJECTION INTRAMUSCULAR; INTRAVENOUS PRN
Status: DISCONTINUED | OUTPATIENT
Start: 2021-06-25 | End: 2021-06-25 | Stop reason: SDUPTHER

## 2021-06-25 RX ORDER — KETOROLAC TROMETHAMINE 30 MG/ML
INJECTION, SOLUTION INTRAMUSCULAR; INTRAVENOUS PRN
Status: DISCONTINUED | OUTPATIENT
Start: 2021-06-25 | End: 2021-06-25 | Stop reason: SDUPTHER

## 2021-06-25 RX ORDER — MIDAZOLAM HYDROCHLORIDE 2 MG/2ML
1 INJECTION, SOLUTION INTRAMUSCULAR; INTRAVENOUS EVERY 10 MIN PRN
Status: CANCELLED | OUTPATIENT
Start: 2021-06-25

## 2021-06-25 RX ORDER — MAGNESIUM HYDROXIDE 1200 MG/15ML
LIQUID ORAL CONTINUOUS PRN
Status: COMPLETED | OUTPATIENT
Start: 2021-06-25 | End: 2021-06-25

## 2021-06-25 RX ORDER — EPHEDRINE SULFATE/0.9% NACL/PF 50 MG/5 ML
SYRINGE (ML) INTRAVENOUS PRN
Status: DISCONTINUED | OUTPATIENT
Start: 2021-06-25 | End: 2021-06-25 | Stop reason: SDUPTHER

## 2021-06-25 RX ORDER — GLYCOPYRROLATE 1 MG/5 ML
SYRINGE (ML) INTRAVENOUS PRN
Status: DISCONTINUED | OUTPATIENT
Start: 2021-06-25 | End: 2021-06-25 | Stop reason: SDUPTHER

## 2021-06-25 RX ORDER — ONDANSETRON 2 MG/ML
INJECTION INTRAMUSCULAR; INTRAVENOUS PRN
Status: DISCONTINUED | OUTPATIENT
Start: 2021-06-25 | End: 2021-06-25 | Stop reason: SDUPTHER

## 2021-06-25 RX ADMIN — ROCURONIUM BROMIDE 50 MG: 10 INJECTION INTRAVENOUS at 11:35

## 2021-06-25 RX ADMIN — DEXAMETHASONE SODIUM PHOSPHATE 10 MG: 10 INJECTION INTRAMUSCULAR; INTRAVENOUS at 11:59

## 2021-06-25 RX ADMIN — FENTANYL CITRATE 100 MCG: 50 INJECTION, SOLUTION INTRAMUSCULAR; INTRAVENOUS at 10:51

## 2021-06-25 RX ADMIN — SODIUM CHLORIDE, POTASSIUM CHLORIDE, SODIUM LACTATE AND CALCIUM CHLORIDE: 600; 310; 30; 20 INJECTION, SOLUTION INTRAVENOUS at 12:58

## 2021-06-25 RX ADMIN — Medication 10 MG: at 12:29

## 2021-06-25 RX ADMIN — LIDOCAINE HYDROCHLORIDE 50 MG: 10 INJECTION, SOLUTION EPIDURAL; INFILTRATION; INTRACAUDAL; PERINEURAL at 11:35

## 2021-06-25 RX ADMIN — FENTANYL CITRATE 50 MCG: 50 INJECTION, SOLUTION INTRAMUSCULAR; INTRAVENOUS at 14:34

## 2021-06-25 RX ADMIN — MIDAZOLAM HYDROCHLORIDE 2 MG: 1 INJECTION, SOLUTION INTRAMUSCULAR; INTRAVENOUS at 10:51

## 2021-06-25 RX ADMIN — Medication 0.4 MG: at 13:35

## 2021-06-25 RX ADMIN — Medication 10 MG: at 13:19

## 2021-06-25 RX ADMIN — PROPOFOL INJECTABLE EMULSION 200 MG: 10 INJECTION, EMULSION INTRAVENOUS at 11:35

## 2021-06-25 RX ADMIN — MIDAZOLAM HYDROCHLORIDE 2 MG: 1 INJECTION, SOLUTION INTRAMUSCULAR; INTRAVENOUS at 11:29

## 2021-06-25 RX ADMIN — OXYCODONE HYDROCHLORIDE AND ACETAMINOPHEN 1 TABLET: 5; 325 TABLET ORAL at 14:52

## 2021-06-25 RX ADMIN — FENTANYL CITRATE 50 MCG: 50 INJECTION, SOLUTION INTRAMUSCULAR; INTRAVENOUS at 13:39

## 2021-06-25 RX ADMIN — FENTANYL CITRATE 50 MCG: 50 INJECTION, SOLUTION INTRAMUSCULAR; INTRAVENOUS at 14:26

## 2021-06-25 RX ADMIN — KETOROLAC TROMETHAMINE 30 MG: 30 INJECTION, SOLUTION INTRAMUSCULAR at 13:45

## 2021-06-25 RX ADMIN — Medication 3 MG: at 13:35

## 2021-06-25 RX ADMIN — FENTANYL CITRATE 50 MCG: 50 INJECTION, SOLUTION INTRAMUSCULAR; INTRAVENOUS at 12:09

## 2021-06-25 RX ADMIN — FENTANYL CITRATE 100 MCG: 50 INJECTION, SOLUTION INTRAMUSCULAR; INTRAVENOUS at 11:35

## 2021-06-25 RX ADMIN — Medication 10 MG: at 11:58

## 2021-06-25 RX ADMIN — CEFAZOLIN 2000 MG: 10 INJECTION, POWDER, FOR SOLUTION INTRAVENOUS at 11:56

## 2021-06-25 RX ADMIN — FENTANYL CITRATE 50 MCG: 50 INJECTION, SOLUTION INTRAMUSCULAR; INTRAVENOUS at 13:58

## 2021-06-25 RX ADMIN — ONDANSETRON 4 MG: 2 INJECTION, SOLUTION INTRAMUSCULAR; INTRAVENOUS at 13:35

## 2021-06-25 RX ADMIN — BUPIVACAINE HYDROCHLORIDE 20 ML: 2.5 INJECTION, SOLUTION EPIDURAL; INFILTRATION; INTRACAUDAL; PERINEURAL at 11:12

## 2021-06-25 RX ADMIN — SODIUM CHLORIDE, POTASSIUM CHLORIDE, SODIUM LACTATE AND CALCIUM CHLORIDE: 600; 310; 30; 20 INJECTION, SOLUTION INTRAVENOUS at 10:43

## 2021-06-25 RX ADMIN — FENTANYL CITRATE 50 MCG: 50 INJECTION, SOLUTION INTRAMUSCULAR; INTRAVENOUS at 14:16

## 2021-06-25 ASSESSMENT — PULMONARY FUNCTION TESTS
PIF_VALUE: 25
PIF_VALUE: 24
PIF_VALUE: 25
PIF_VALUE: 25
PIF_VALUE: 29
PIF_VALUE: 25
PIF_VALUE: 2
PIF_VALUE: 24
PIF_VALUE: 2
PIF_VALUE: 28
PIF_VALUE: 18
PIF_VALUE: 2
PIF_VALUE: 25
PIF_VALUE: 5
PIF_VALUE: 18
PIF_VALUE: 25
PIF_VALUE: 26
PIF_VALUE: 19
PIF_VALUE: 2
PIF_VALUE: 25
PIF_VALUE: 25
PIF_VALUE: 22
PIF_VALUE: 29
PIF_VALUE: 26
PIF_VALUE: 26
PIF_VALUE: 2
PIF_VALUE: 1
PIF_VALUE: 1
PIF_VALUE: 10
PIF_VALUE: 23
PIF_VALUE: 25
PIF_VALUE: 25
PIF_VALUE: 2
PIF_VALUE: 23
PIF_VALUE: 25
PIF_VALUE: 23
PIF_VALUE: 21
PIF_VALUE: 29
PIF_VALUE: 5
PIF_VALUE: 25
PIF_VALUE: 18
PIF_VALUE: 3
PIF_VALUE: 25
PIF_VALUE: 2
PIF_VALUE: 25
PIF_VALUE: 25
PIF_VALUE: 5
PIF_VALUE: 19
PIF_VALUE: 28
PIF_VALUE: 20
PIF_VALUE: 26
PIF_VALUE: 19
PIF_VALUE: 19
PIF_VALUE: 2
PIF_VALUE: 19
PIF_VALUE: 25
PIF_VALUE: 25
PIF_VALUE: 30
PIF_VALUE: 25
PIF_VALUE: 17
PIF_VALUE: 25
PIF_VALUE: 19
PIF_VALUE: 19
PIF_VALUE: 2
PIF_VALUE: 24
PIF_VALUE: 0
PIF_VALUE: 25
PIF_VALUE: 21
PIF_VALUE: 21
PIF_VALUE: 30
PIF_VALUE: 29
PIF_VALUE: 25
PIF_VALUE: 18
PIF_VALUE: 29
PIF_VALUE: 18
PIF_VALUE: 19
PIF_VALUE: 25
PIF_VALUE: 28
PIF_VALUE: 26
PIF_VALUE: 25
PIF_VALUE: 19
PIF_VALUE: 1
PIF_VALUE: 19
PIF_VALUE: 25
PIF_VALUE: 25
PIF_VALUE: 19
PIF_VALUE: 3
PIF_VALUE: 19
PIF_VALUE: 21
PIF_VALUE: 18
PIF_VALUE: 25
PIF_VALUE: 14
PIF_VALUE: 30
PIF_VALUE: 30
PIF_VALUE: 25
PIF_VALUE: 19
PIF_VALUE: 25
PIF_VALUE: 25
PIF_VALUE: 28
PIF_VALUE: 19
PIF_VALUE: 25
PIF_VALUE: 27
PIF_VALUE: 29
PIF_VALUE: 25
PIF_VALUE: 30
PIF_VALUE: 25
PIF_VALUE: 26
PIF_VALUE: 12
PIF_VALUE: 29
PIF_VALUE: 20
PIF_VALUE: 2
PIF_VALUE: 30
PIF_VALUE: 29
PIF_VALUE: 19
PIF_VALUE: 24
PIF_VALUE: 24
PIF_VALUE: 25
PIF_VALUE: 26
PIF_VALUE: 18
PIF_VALUE: 19
PIF_VALUE: 29
PIF_VALUE: 19
PIF_VALUE: 19
PIF_VALUE: 30
PIF_VALUE: 20
PIF_VALUE: 0
PIF_VALUE: 18
PIF_VALUE: 19
PIF_VALUE: 28
PIF_VALUE: 29
PIF_VALUE: 25
PIF_VALUE: 21
PIF_VALUE: 25

## 2021-06-25 ASSESSMENT — PAIN SCALES - GENERAL
PAINLEVEL_OUTOF10: 8
PAINLEVEL_OUTOF10: 8
PAINLEVEL_OUTOF10: 0
PAINLEVEL_OUTOF10: 7
PAINLEVEL_OUTOF10: 8
PAINLEVEL_OUTOF10: 7
PAINLEVEL_OUTOF10: 7

## 2021-06-25 ASSESSMENT — PAIN DESCRIPTION - PAIN TYPE
TYPE: SURGICAL PAIN

## 2021-06-25 ASSESSMENT — PAIN DESCRIPTION - LOCATION
LOCATION: ABDOMEN

## 2021-06-25 ASSESSMENT — ENCOUNTER SYMPTOMS
SHORTNESS OF BREATH: 0
STRIDOR: 0

## 2021-06-25 ASSESSMENT — PAIN - FUNCTIONAL ASSESSMENT: PAIN_FUNCTIONAL_ASSESSMENT: 0-10

## 2021-06-25 NOTE — H&P
performed by Tammy Cannon MD at [address] [place] [address] Drive SEPTOPLASTY                    Current Outpatient Medications   Medication Sig Dispense Refill    vitamin D (ERGOCALCIFEROL) 1.25 MG (68502 UT) CAPS capsule TK ONE CAPSULE PO ONCE A WEEK        vitamin C (ASCORBIC ACID) 500 MG tablet Take 500 mg by mouth daily        Multiple Vitamins-Minerals (THERAPEUTIC MULTIVITAMIN-MINERALS) tablet Take 1 tablet by mouth daily        lisinopril-hydroCHLOROthiazide (PRINZIDE;ZESTORETIC) 20-12.5 MG per tablet Take 1 tablet by mouth daily          No current facility-administered medications for this visit.         No Known Allergies           Family History   Problem Relation Age of Onset    High Blood Pressure Mother      No Known Problems Father      Lupus Sister      High Blood Pressure Sister      No Known Problems Brother      No Known Problems Maternal Grandmother      No Known Problems Maternal Grandfather      No Known Problems Paternal Grandmother      No Known Problems Paternal Grandfather      No Known Problems Sister      No Known Problems Sister      No Known Problems Sister      No Known Problems Brother      No Known Problems Brother      No Known Problems Brother           Social History            Socioeconomic History    Marital status: Single       Spouse name: Not on file    Number of children: Not on file    Years of education: Not on file    Highest education level: Not on file   Occupational History    Not on file   Tobacco Use    Smoking status: Never Smoker    Smokeless tobacco: Never Used   Vaping Use    Vaping Use: Never used   Substance and Sexual Activity    Alcohol use: No    Drug use: No    Sexual activity: Yes       Partners: Female       Birth control/protection: Condom   Other Topics Concern    Not on file   Social History Narrative    Not on file      Social Determinants of Health          Financial Resource Strain:     Difficulty of Paying Living Expenses:    Food ventral hernia repair, witnessed    2. Will see post op in clinic      Pt discussed with Mariam Garcia DO  6/23/2021     Attending Physician Statement  I have discussed the case with Dr Ruchi Godoy, including pertinent history and exam findings with the resident. I have seen and examined the patient and the key elements of the encounter have been performed by me.   I agree with the assessment, plan and orders as documented by the resident.       Electronically signed by Pablo Diaz IV, DO  on 6/23/2021 at 11:38 AM

## 2021-06-25 NOTE — ANESTHESIA PRE PROCEDURE
Department of Anesthesiology  Preprocedure Note       Name:  Margo Pandya   Age:  64 y.o.  :  1960                                          MRN:  [id number]         Date:  2021      Surgeon: Tyler Preciado):  Latisha Diaz IV, DO    Procedure: XI ROBOTIC LAPAROSCOPIC HERNIA VENTRAL REPAIR WITH MESH (N/A )    Medications prior to admission:   Prior to Admission medications    Medication Sig Start Date End Date Taking?  Authorizing Provider   vitamin D (ERGOCALCIFEROL) 1.25 MG (13575 UT) CAPS capsule TK ONE CAPSULE PO ONCE A WEEK 20  Yes Historical Provider, MD   Multiple Vitamins-Minerals (THERAPEUTIC MULTIVITAMIN-MINERALS) tablet Take 1 tablet by mouth daily   Yes Historical Provider, MD   lisinopril-hydroCHLOROthiazide (PRINZIDE;ZESTORETIC) 20-12.5 MG per tablet Take 1 tablet by mouth daily   Yes Historical Provider, MD       Current medications:    Current Facility-Administered Medications   Medication Dose Route Frequency Provider Last Rate Last Admin    ceFAZolin (ANCEF) 2000 mg in dextrose 5 % 50 mL IVPB  2,000 mg Intravenous Once [employer] IV, DO           Allergies:  No Known Allergies    Problem List:    Patient Active Problem List   Diagnosis Code    Plantar wart B07.0    Dysuria R30.0    Prostate cancer ([facility] C61    Chest pain R07.9       Past Medical History:        Diagnosis Date    Cancer ([facility] 2019    prostate    Hematuria     Hypertension     Intermittent abdominal pain     Nerve injury     left leg , states  s/p prostatectomy    DILAN (obstructive sleep apnea)     DOES NOT HAVE MACHINE    Prediabetes        Past Surgical History:        Procedure Laterality Date    APPENDECTOMY      CIRCUMCISION N/A 7/10/2020    CIRCUMCISION performed by Asif Vaughn MD at [address] [facility] 2021    COLONOSCOPY POLYPECTOMY HOT BIOPSY performed by Joana Myles MD at [place] [address] N/A 2019    CYSTOSCOPY performed by Asif Vaughn MD at [address] [place] [address] CYSTOSCOPY N/A 5/28/2020    CYSTOSCOPY performed by Kelly Galindo MD at [address] [facility]    NECK    PROSTATE BIOPSY N/A 3/21/2019    PROSTATE BIOPSY, ULTRASOUND  (OFFICE TO CONTACT [place] performed by Kelly Galindo MD at [facility] 5/31/2019    XI LAPAROSCOPIC ROBOTIC RADICAL PROSTATECTOMY, PELVIC LYMPH NODE DISSECTION performed by Kelly Galindo MD at [address] [place] [address]         Social History:    Social History     Tobacco Use    Smoking status: Never Smoker    Smokeless tobacco: Never Used   Substance Use Topics    Alcohol use: No                                Counseling given: Not Answered      Vital Signs (Current): There were no vitals filed for this visit.                                            BP Readings from Last 3 Encounters:   06/23/21 130/70   05/21/21 123/70   04/16/21 130/78       NPO Status: Time of last liquid consumption: 2359                        Time of last solid consumption: 2359                        Date of last liquid consumption: 06/24/21                        Date of last solid food consumption: 06/24/21    BMI:   Wt Readings from Last 3 Encounters:   06/23/21 232 lb (105.2 kg)   05/21/21 236 lb (107 kg)   04/16/21 236 lb (107 kg)     There is no height or weight on file to calculate BMI.    CBC:   Lab Results   Component Value Date    WBC 6.7 01/20/2021    RBC 5.04 01/20/2021    HGB 14.7 01/20/2021    HCT 43.4 01/20/2021    MCV 86.1 01/20/2021    RDW 13.1 01/20/2021     01/20/2021       CMP:   Lab Results   Component Value Date     01/20/2021    K 4.0 01/20/2021    CL 98 01/20/2021    CO2 27 01/20/2021    BUN 16 01/20/2021    CREATININE 0.79 01/20/2021    GFRAA >60 01/20/2021    LABGLOM >60 01/20/2021    GLUCOSE 204 01/20/2021    PROT 7.4 10/08/2020    CALCIUM 9.4 01/20/2021    BILITOT 1.32 10/08/2020    ALKPHOS 61 10/08/2020    AST 17 10/08/2020    ALT 30 10/08/2020       POC Tests: No results for input(s): POCGLU, POCNA, POCK, POCCL, POCBUN, POCHEMO, POCHCT in the last 72 hours. Coags: No results found for: PROTIME, INR, APTT    HCG (If Applicable): No results found for: PREGTESTUR, PREGSERUM, HCG, HCGQUANT     ABGs: No results found for: PHART, PO2ART, WBQ6QDG, FQE7BMT, BEART, F8EKGWUQ     Type & Screen (If Applicable):  No results found for: LABABO, LABRH    Anesthesia Evaluation   no history of anesthetic complications:   Airway: Mallampati: II  TM distance: >3 FB   Neck ROM: full  Mouth opening: > = 3 FB Dental:          Pulmonary:   (+) sleep apnea:      (-) COPD, asthma, shortness of breath and stridor                           Cardiovascular:    (+) hypertension:,     (-) pacemaker, past MI, CABG/stent,  angina and  CHF        Rate: normal                    Neuro/Psych:      (-) seizures, TIA and CVA           GI/Hepatic/Renal:        (-) GERD, liver disease and no renal disease       Endo/Other:    (+) malignancy/cancer. (-) diabetes mellitus, hypothyroidism, hyperthyroidism, blood dyscrasia               Abdominal:   (+) obese,     Abdomen: soft. Vascular: negative vascular ROS. Other Findings:               Anesthesia Plan      general and regional     ASA 3       Induction: intravenous. MIPS: Postoperative opioids intended. Anesthetic plan and risks discussed with patient. Plan discussed with CRNA.                   Robert Caldera MD   6/25/2021

## 2021-06-25 NOTE — ANESTHESIA POSTPROCEDURE EVALUATION
Department of Anesthesiology  Postprocedure Note    Patient: Boubacar Walker  MRN: [id number]  YOB: 1960  Date of evaluation: 6/25/2021  Time:  3:45 PM     Procedure Summary     Date: 06/25/21 Room / Location: [address]    Anesthesia Start: 1128 Anesthesia Stop: 0446    Procedure: XI ROBOTIC LAPAROSCOPIC HERNIA VENTRAL REPAIR WITH MESH (N/A ) Diagnosis: (VENTRAL HERNIA)    Surgeons: Kiara Diaz IV, DO Responsible Provider: Lucy Rodriguez MD    Anesthesia Type: general, regional ASA Status: 3          Anesthesia Type: general, regional    Aura Phase I: Aura Score: 10    Aura Phase II: Aura Score: 10    Last vitals: Reviewed and per EMR flowsheets.      POST-OP ANESTHESIA NOTE       /68   Pulse 68   Temp 96.8 °F (36 °C) (Temporal)   Resp 12   SpO2 95%    Pain Assessment: 0-10  Pain Level: 7       Anesthesia Post Evaluation    Patient location during evaluation: PACU  Patient participation: complete - patient participated  Level of consciousness: awake  Pain score: 7  Airway patency: patent  Nausea & Vomiting: no vomiting and no nausea  Complications: no  Cardiovascular status: hemodynamically stable  Respiratory status: acceptable  Hydration status: stable

## 2021-06-25 NOTE — OP NOTE
Operative Note      Patient: Hussain Sharma  YOB: 1960  MRN: [id number]    Date of Procedure: 6/25/2021    Pre-Op Diagnosis: VENTRAL HERNIA    Post-Op Diagnosis: Ventral incisional hernia       Procedure(s):  XI ROBOTIC LAPAROSCOPIC HERNIA VENTRAL REPAIR WITH MESH    Surgeon(s):  Jose Gutierrez IV, DO    Assistant:   Surgical Assistant: Elías Sarmiento  Resident: Jason Woodard DO    Anesthesia: General    Estimated Blood Loss (mL): Minimal    Complications: None    Specimens:   * No specimens in log *    Implants:  Implant Name Type Inv. Item Serial No.  Lot No. LRB No. Used Action   MESH SURG DIA4. 5IN CIR W/ ECHO 2 POS SYS VENTRALIGHT  MESH SURG DIA4. 5IN CIR W/ ECHO 2 POS SYS VENTRALIGHT  BARD DAVOL-WD [id number] N/A 1 Implanted         Drains: * No LDAs found *    Findings: 2.5 cm incisional ventral hernia with incarcerated omentum    Indications for Surgery:  64 y.o. male who presents to [facility] for incisional hernia evaluation. Patient with hx of laparoscopic robotic assisted prostatectomy. Patient with abdominal wall bulge at prostate extraction site. Patient taken to surgery electively for repair. Patient informed of the risks of surgery. Patient understood risks and signed informed consent for laparoscopic robotic assisted ventral hernia repair under general anesthesia. Detailed Description of Procedure: The patient was taken to the operative suite, placed in supine position. Time-out was done to verify correct patient and procedure. General anesthesia with endotracheal tube was induced. Preoperative antibiotics were given. SCDs were placed. The patient's arms were then tucked bilaterally. The abdomen was prepped and draped in usual sterile fashion. Entrance into the abdomen was gained with a Veress needle placed in the left upper quadrant at Zhong's point. A saline drop test was used to confirm placement into the abdomen.   A pneumoperitoneum was

## 2021-06-25 NOTE — PROGRESS NOTES
Pt discharged to home. Heplock and monitor removed. Discharge instructions given and explained to pt and daughter. Pt and daughter verbalized understanding. Pt and daughter denies any questions or concerns. Pt assisted to front door in wheelchair with all belongings.

## 2021-09-08 ENCOUNTER — HOSPITAL ENCOUNTER (OUTPATIENT)
Age: 61
Setting detail: SPECIMEN
Discharge: HOME | End: 2021-09-08
Payer: MEDICAID

## 2021-09-09 ENCOUNTER — HOSPITAL ENCOUNTER (OUTPATIENT)
Age: 61
Discharge: HOME | End: 2021-09-09
Payer: MEDICAID

## 2021-09-09 LAB
ALBUMIN SERPL-MCNC: 4.5 G/DL (ref 3.5–5.2)
ALBUMIN/GLOBULIN RATIO: 1.3 (ref 1–2.5)
ALP BLD-CCNC: 70 U/L (ref 40–129)
ALT SERPL-CCNC: 50 U/L (ref 5–41)
ANION GAP SERPL CALCULATED.3IONS-SCNC: 13 MMOL/L (ref 9–17)
AST SERPL-CCNC: 30 U/L
BILIRUB SERPL-MCNC: 1.03 MG/DL (ref 0.3–1.2)
BUN BLDV-MCNC: 15 MG/DL (ref 6–20)
BUN/CREAT BLD: ABNORMAL (ref 9–20)
CALCIUM SERPL-MCNC: 9.4 MG/DL (ref 8.6–10.4)
CHLORIDE BLD-SCNC: 103 MMOL/L (ref 98–107)
CHOLESTEROL, FASTING: 206 MG/DL
CHOLESTEROL/HDL RATIO: 3.8
CO2: 23 MMOL/L (ref 20–31)
CREAT SERPL-MCNC: 0.88 MG/DL (ref 0.7–1.2)
CREATININE URINE: 141.5 MG/DL (ref 39–259)
ESTIMATED AVERAGE GLUCOSE: 128 MG/DL
GFR AFRICAN AMERICAN: >60 ML/MIN
GFR NON-AFRICAN AMERICAN: >60 ML/MIN
GFR SERPL CREATININE-BSD FRML MDRD: ABNORMAL ML/MIN/{1.73_M2}
GFR SERPL CREATININE-BSD FRML MDRD: ABNORMAL ML/MIN/{1.73_M2}
GLUCOSE BLD-MCNC: 117 MG/DL (ref 70–99)
HBA1C MFR BLD: 6.1 % (ref 4–6)
HDLC SERPL-MCNC: 54 MG/DL
LDL CHOLESTEROL: 123 MG/DL (ref 0–130)
MICROALBUMIN/CREAT 24H UR: 21 MG/L
MICROALBUMIN/CREAT UR-RTO: 15 MCG/MG CREAT
POTASSIUM SERPL-SCNC: 4.1 MMOL/L (ref 3.7–5.3)
SODIUM BLD-SCNC: 139 MMOL/L (ref 135–144)
TOTAL PROTEIN: 7.9 G/DL (ref 6.4–8.3)
TRIGLYCERIDE, FASTING: 147 MG/DL
VITAMIN D 25-HYDROXY: 23.7 NG/ML (ref 30–100)
VLDLC SERPL CALC-MCNC: ABNORMAL MG/DL (ref 1–30)

## 2021-09-09 PROCEDURE — 82306 VITAMIN D 25 HYDROXY: CPT

## 2021-09-09 PROCEDURE — 83036 HEMOGLOBIN GLYCOSYLATED A1C: CPT

## 2021-09-09 PROCEDURE — 80053 COMPREHEN METABOLIC PANEL: CPT

## 2021-09-09 PROCEDURE — 36415 COLL VENOUS BLD VENIPUNCTURE: CPT

## 2021-09-09 PROCEDURE — 80061 LIPID PANEL: CPT

## 2021-11-10 ENCOUNTER — TELEPHONE (OUTPATIENT)
Dept: UROLOGY | Age: 61
End: 2021-11-10

## 2021-11-10 NOTE — TELEPHONE ENCOUNTER
Pt calling in regards to getting trimex reordered as he could not afford it before. I explained to him that he hasn't been seen in 6 months and the provider may not approve this without evaluation with him first. Pt refused appointment wanted provider to call him. I explained I would put the message in but since he  Hasn't been seen in 6 months, my call back to him may be for an appt. Pt accepted 11/19 appt, but would like the trimex to be resent.

## 2021-11-19 ENCOUNTER — OFFICE VISIT (OUTPATIENT)
Dept: UROLOGY | Age: 61
End: 2021-11-19
Payer: MEDICAID

## 2021-11-19 VITALS
OXYGEN SATURATION: 97 % | HEART RATE: 81 BPM | BODY MASS INDEX: 35.61 KG/M2 | WEIGHT: 235 LBS | SYSTOLIC BLOOD PRESSURE: 147 MMHG | HEIGHT: 68 IN | DIASTOLIC BLOOD PRESSURE: 79 MMHG

## 2021-11-19 DIAGNOSIS — N52.9 ERECTILE DYSFUNCTION, UNSPECIFIED ERECTILE DYSFUNCTION TYPE: ICD-10-CM

## 2021-11-19 DIAGNOSIS — N39.3 STRESS INCONTINENCE: ICD-10-CM

## 2021-11-19 DIAGNOSIS — C61 PROSTATE CANCER (HCC): Primary | ICD-10-CM

## 2021-11-19 PROCEDURE — 1036F TOBACCO NON-USER: CPT | Performed by: UROLOGY

## 2021-11-19 PROCEDURE — G8427 DOCREV CUR MEDS BY ELIG CLIN: HCPCS | Performed by: UROLOGY

## 2021-11-19 PROCEDURE — 99214 OFFICE O/P EST MOD 30 MIN: CPT | Performed by: UROLOGY

## 2021-11-19 PROCEDURE — G8417 CALC BMI ABV UP PARAM F/U: HCPCS | Performed by: UROLOGY

## 2021-11-19 PROCEDURE — 3017F COLORECTAL CA SCREEN DOC REV: CPT | Performed by: UROLOGY

## 2021-11-19 PROCEDURE — G8484 FLU IMMUNIZE NO ADMIN: HCPCS | Performed by: UROLOGY

## 2021-11-19 NOTE — PROGRESS NOTES
Sarina Gentile MD        [facility] PHYSICIANS  [facility] [address]  [address]  [place] [address] [address] [facility] [address]  Dept: [phone]  Dept Fax: [phone] [facility] Urology Office Note -     Patient:  Skye Trimble  YOB: 1960  Date: 11/19/2021    The patient is a 63 y.o. male who presents today for evaluation of the following problems:   Chief Complaint   Patient presents with    Follow-up     F/U wants to discuss trimex script     referred/consultation requested by CARMELA Crow. HISTORY OF PRESENT ILLNESS:     Prostate cancer  S/p ralp  Recent PSA undetectable        JUSTIN  stable      ED  Worsening  Sildenafil not helping  Discussed prosthesis  Here for trimix        Phimosis  S/p circ            Requested/reviewed records from CARMELA Crow office and/or outside physician/EMR    (Patient's old records have been requested, reviewed and pertinent findings summarized in today's note.)    Last several PSA's:  Lab Results   Component Value Date    PSA <0.01 04/16/2021    PSA <0.01 10/08/2020    PSA <0.01 09/30/2020       Last total testosterone:  No results found for: TESTOSTERONE    Urinalysis today:  No results found for this visit on 11/19/21. Last BUN and creatinine:  Lab Results   Component Value Date    BUN 15 09/09/2021     Lab Results   Component Value Date    CREATININE 0.88 09/09/2021         Imaging Reviewed during this Office Visit:   Sarina Gentile MD independently reviewed the images and verified the radiology reports from:    Ct Sinus Wo Contrast    Result Date: 10/9/2019  EXAMINATION: CT OF THE SINUS WITHOUT CONTRAST  10/9/2019 4:02 pm TECHNIQUE: CT of the sinuses was performed without the administration of intravenous contrast. Multiplanar reformatted images are provided for review.  Dose modulation, iterative reconstruction, and/or weight based adjustment of the mA/kV was utilized to reduce the radiation dose to as low as reasonably achievable. COMPARISON: None HISTORY: ORDERING SYSTEM PROVIDED HISTORY: Chronic maxillary sinusitis FINDINGS: SINUSES/MASTOIDS:  Limited coronal imaging of the paranasal sinuses demonstrates multifocal mucosal thickening. No definitive air-fluid levels are noted. Leftward deviation of the nasal septum is noted. SOFT TISSUES:  Soft tissues are markedly limited. Exam is presented in the coronal plane only and in the bone algorithm only.      Minimal mucosal thickening is noted No definitive air-fluid level is noted       PAST MEDICAL, FAMILY AND SOCIAL HISTORY:  Past Medical History:   Diagnosis Date    Cancer [facility]) 2019    prostate    Hematuria     Hypertension     Intermittent abdominal pain     Nerve injury     left leg , states  s/p prostatectomy    DILAN (obstructive sleep apnea)     DOES NOT HAVE MACHINE    Prediabetes      Past Surgical History:   Procedure Laterality Date    APPENDECTOMY      CIRCUMCISION N/A 07/10/2020    CIRCUMCISION performed by Terell Salcido MD at [address] [facility] N/A 02/01/2021    COLONOSCOPY POLYPECTOMY HOT BIOPSY performed by Kim Melendez MD at [place] [address] N/A 02/07/2019    CYSTOSCOPY performed by Terell Salcido MD at [address] N/A 05/28/2020    CYSTOSCOPY performed by Terell Salcido MD at [facility]  06/25/2021    XI ROBOTIC [address] [place] N/A 6/25/2021    XI ROBOTIC [facility] performed by Toni Diaz IV, DO at [address] [facility] Right 2004    NECK    PROSTATE BIOPSY N/A 03/21/2019    PROSTATE BIOPSY, ULTRASOUND  (OFFICE TO CONTACT [place] performed by Terell Salcido MD at [facility] 05/31/2019    XI LAPAROSCOPIC ROBOTIC RADICAL PROSTATECTOMY, PELVIC LYMPH NODE DISSECTION performed by Terell Salcido MD at [address] [place] [address] Drive SEPTOPLASTY       Family History   Problem Relation Age of Onset    High Blood Pressure Mother     No Known Problems Father     Lupus Sister     High Blood Pressure Sister     No Known Problems Brother     No Known Problems Maternal Grandmother     No Known Problems Maternal Grandfather     No Known Problems Paternal Grandmother     No Known Problems Paternal Grandfather     No Known Problems Sister     No Known Problems Sister     No Known Problems Sister     No Known Problems Brother     No Known Problems Brother     No Known Problems Brother      Outpatient Medications Marked as Taking for the 11/19/21 encounter (Office Visit) with Katie Raymundo MD   Medication Sig Dispense Refill    docusate sodium (COLACE) 100 MG capsule Take 1 capsule by mouth daily as needed for Constipation 30 capsule 1    Multiple Vitamins-Minerals (THERAPEUTIC MULTIVITAMIN-MINERALS) tablet Take 1 tablet by mouth daily      lisinopril-hydroCHLOROthiazide (PRINZIDE;ZESTORETIC) 20-12.5 MG per tablet Take 1 tablet by mouth daily         Patient has no known allergies. Social History     Tobacco Use   Smoking Status Never Smoker   Smokeless Tobacco Never Used      (If patient a smoker, smoking cessation counseling offered)   Social History     Substance and Sexual Activity   Alcohol Use No       REVIEW OF SYSTEMS:  Constitutional: negative  Eyes: negative  Respiratory: negative  Cardiovascular: negative  Gastrointestinal: negative  Genitourinary: see HPI  Musculoskeletal: negative  Skin: negative   Neurological: negative  Hematological/Lymphatic: negative  Psychological: negative      Physical Exam:    This a 63 y.o. male  Vitals:    11/19/21 0910   BP: (!) 147/79   Pulse:    SpO2:      Body mass index is 35.73 kg/m². Constitutional: Patient in no acute distress;       Assessment and Plan        1. Prostate cancer ([facility] [address]    2. Erectile dysfunction, unspecified erectile dysfunction type    3.  Stress incontinence               Plan:       neuropathic pain after surgery has improved (leg pain/numbness)  Doing well after circumcision- is complaining of weight gain and buried penis. Recommend weight loss  Voiding w minimal incontinence  psa normal  ED- worsening. trimix ordered for patient. Pt may elect for prosthesis. I discussed that length IS NOT improved with this surgery. Will follow up for trimix injections. I resent this. Incisional hernia- s/p repair    Follow up in one month for trimix injection in [facility] office      Prescriptions Ordered:  No orders of the defined types were placed in this encounter. Orders Placed:  No orders of the defined types were placed in this encounter.

## 2022-02-07 ENCOUNTER — HOSPITAL ENCOUNTER (OUTPATIENT)
Dept: GENERAL RADIOLOGY | Age: 62
Discharge: HOME | End: 2022-02-09
Payer: MEDICAID

## 2022-02-07 ENCOUNTER — HOSPITAL ENCOUNTER (OUTPATIENT)
Age: 62
Discharge: HOME | End: 2022-02-09
Payer: MEDICAID

## 2022-02-07 ENCOUNTER — HOSPITAL ENCOUNTER (OUTPATIENT)
Age: 62
Discharge: HOME | End: 2022-02-07
Payer: MEDICAID

## 2022-02-07 DIAGNOSIS — M25.511 RIGHT SHOULDER PAIN, UNSPECIFIED CHRONICITY: ICD-10-CM

## 2022-02-07 LAB — VITAMIN D 25-HYDROXY: 20.5 NG/ML (ref 30–100)

## 2022-02-07 PROCEDURE — 36415 COLL VENOUS BLD VENIPUNCTURE: CPT

## 2022-02-07 PROCEDURE — 73030 X-RAY EXAM OF SHOULDER: CPT

## 2022-02-07 PROCEDURE — 82306 VITAMIN D 25 HYDROXY: CPT

## 2022-03-26 ENCOUNTER — HOSPITAL ENCOUNTER (OUTPATIENT)
Age: 62
Setting detail: OBSERVATION
Discharge: HOME | End: 2022-03-28
Attending: EMERGENCY MEDICINE | Admitting: EMERGENCY MEDICINE
Payer: MEDICAID

## 2022-03-26 ENCOUNTER — APPOINTMENT (OUTPATIENT)
Dept: CT IMAGING | Age: 62
End: 2022-03-26
Payer: MEDICAID

## 2022-03-26 ENCOUNTER — APPOINTMENT (OUTPATIENT)
Dept: GENERAL RADIOLOGY | Age: 62
End: 2022-03-26
Payer: MEDICAID

## 2022-03-26 DIAGNOSIS — R29.898 WEAKNESS OF LEFT LOWER EXTREMITY: Primary | ICD-10-CM

## 2022-03-26 DIAGNOSIS — R07.9 CHEST PAIN, UNSPECIFIED TYPE: ICD-10-CM

## 2022-03-26 LAB
ABSOLUTE EOS #: 0.17 K/UL (ref 0–0.44)
ABSOLUTE IMMATURE GRANULOCYTE: 0.03 K/UL (ref 0–0.3)
ABSOLUTE LYMPH #: 4.12 K/UL (ref 1.1–3.7)
ABSOLUTE MONO #: 0.84 K/UL (ref 0.1–1.2)
ALBUMIN SERPL-MCNC: 4.4 G/DL (ref 3.5–5.2)
ALBUMIN/GLOBULIN RATIO: 1.4 (ref 1–2.5)
ALP BLD-CCNC: 65 U/L (ref 40–129)
ALT SERPL-CCNC: 48 U/L (ref 5–41)
ANION GAP SERPL CALCULATED.3IONS-SCNC: 17 MMOL/L (ref 9–17)
AST SERPL-CCNC: 38 U/L
BASOPHILS # BLD: 1 % (ref 0–2)
BASOPHILS ABSOLUTE: 0.06 K/UL (ref 0–0.2)
BILIRUB SERPL-MCNC: 0.94 MG/DL (ref 0.3–1.2)
BUN BLDV-MCNC: 16 MG/DL (ref 6–20)
CALCIUM SERPL-MCNC: 9.4 MG/DL (ref 8.6–10.4)
CARBOXYHEMOGLOBIN: 1.5 % (ref 0–5)
CHLORIDE BLD-SCNC: 96 MMOL/L (ref 98–107)
CO2: 21 MMOL/L (ref 20–31)
CREAT SERPL-MCNC: 1.17 MG/DL (ref 0.7–1.2)
EOSINOPHILS RELATIVE PERCENT: 2 % (ref 1–4)
GFR AFRICAN AMERICAN: >60 ML/MIN
GFR NON-AFRICAN AMERICAN: >60 ML/MIN
GFR SERPL CREATININE-BSD FRML MDRD: ABNORMAL ML/MIN/{1.73_M2}
GLUCOSE BLD-MCNC: 145 MG/DL (ref 70–99)
HCT VFR BLD CALC: 40.9 % (ref 40.7–50.3)
HEMOGLOBIN: 14.1 G/DL (ref 13–17)
IMMATURE GRANULOCYTES: 0 %
LYMPHOCYTES # BLD: 38 % (ref 24–43)
MAGNESIUM: 2 MG/DL (ref 1.6–2.6)
MCH RBC QN AUTO: 29.2 PG (ref 25.2–33.5)
MCHC RBC AUTO-ENTMCNC: 34.5 G/DL (ref 28.4–34.8)
MCV RBC AUTO: 84.7 FL (ref 82.6–102.9)
MONOCYTES # BLD: 8 % (ref 3–12)
NRBC AUTOMATED: 0 PER 100 WBC
PDW BLD-RTO: 12.6 % (ref 11.8–14.4)
PLATELET # BLD: 224 K/UL (ref 138–453)
PMV BLD AUTO: 11.1 FL (ref 8.1–13.5)
POTASSIUM SERPL-SCNC: 3.2 MMOL/L (ref 3.7–5.3)
PRO-BNP: 87 PG/ML
RBC # BLD: 4.83 M/UL (ref 4.21–5.77)
SEG NEUTROPHILS: 51 % (ref 36–65)
SEGMENTED NEUTROPHILS ABSOLUTE COUNT: 5.71 K/UL (ref 1.5–8.1)
SODIUM BLD-SCNC: 134 MMOL/L (ref 135–144)
TOTAL PROTEIN: 7.5 G/DL (ref 6.4–8.3)
TROPONIN, HIGH SENSITIVITY: 14 NG/L (ref 0–22)
TROPONIN, HIGH SENSITIVITY: 15 NG/L (ref 0–22)
WBC # BLD: 10.9 K/UL (ref 3.5–11.3)

## 2022-03-26 PROCEDURE — 6360000004 HC RX CONTRAST MEDICATION: Performed by: STUDENT IN AN ORGANIZED HEALTH CARE EDUCATION/TRAINING PROGRAM

## 2022-03-26 PROCEDURE — 6370000000 HC RX 637 (ALT 250 FOR IP): Performed by: STUDENT IN AN ORGANIZED HEALTH CARE EDUCATION/TRAINING PROGRAM

## 2022-03-26 PROCEDURE — 93005 ELECTROCARDIOGRAM TRACING: CPT | Performed by: STUDENT IN AN ORGANIZED HEALTH CARE EDUCATION/TRAINING PROGRAM

## 2022-03-26 PROCEDURE — 71045 X-RAY EXAM CHEST 1 VIEW: CPT

## 2022-03-26 PROCEDURE — 83880 ASSAY OF NATRIURETIC PEPTIDE: CPT

## 2022-03-26 PROCEDURE — 71260 CT THORAX DX C+: CPT

## 2022-03-26 PROCEDURE — 82375 ASSAY CARBOXYHB QUANT: CPT

## 2022-03-26 PROCEDURE — 85025 COMPLETE CBC W/AUTO DIFF WBC: CPT

## 2022-03-26 PROCEDURE — 99284 EMERGENCY DEPT VISIT MOD MDM: CPT

## 2022-03-26 PROCEDURE — 6360000002 HC RX W HCPCS: Performed by: STUDENT IN AN ORGANIZED HEALTH CARE EDUCATION/TRAINING PROGRAM

## 2022-03-26 PROCEDURE — 96374 THER/PROPH/DIAG INJ IV PUSH: CPT

## 2022-03-26 PROCEDURE — 87635 SARS-COV-2 COVID-19 AMP PRB: CPT

## 2022-03-26 PROCEDURE — 83735 ASSAY OF MAGNESIUM: CPT

## 2022-03-26 PROCEDURE — 84484 ASSAY OF TROPONIN QUANT: CPT

## 2022-03-26 PROCEDURE — 80053 COMPREHEN METABOLIC PANEL: CPT

## 2022-03-26 RX ORDER — NITROGLYCERIN 0.4 MG/1
0.4 TABLET SUBLINGUAL EVERY 5 MIN PRN
Status: DISCONTINUED | OUTPATIENT
Start: 2022-03-26 | End: 2022-03-28 | Stop reason: HOSPADM

## 2022-03-26 RX ORDER — ONDANSETRON 2 MG/ML
4 INJECTION INTRAMUSCULAR; INTRAVENOUS ONCE
Status: COMPLETED | OUTPATIENT
Start: 2022-03-26 | End: 2022-03-26

## 2022-03-26 RX ORDER — ASPIRIN 81 MG/1
324 TABLET, CHEWABLE ORAL ONCE
Status: COMPLETED | OUTPATIENT
Start: 2022-03-26 | End: 2022-03-26

## 2022-03-26 RX ADMIN — NITROGLYCERIN 0.4 MG: 0.4 TABLET SUBLINGUAL at 21:21

## 2022-03-26 RX ADMIN — IOPAMIDOL 75 ML: 755 INJECTION, SOLUTION INTRAVENOUS at 23:16

## 2022-03-26 RX ADMIN — ONDANSETRON 4 MG: 2 INJECTION INTRAMUSCULAR; INTRAVENOUS at 21:21

## 2022-03-26 RX ADMIN — ASPIRIN 324 MG: 81 TABLET, CHEWABLE ORAL at 21:21

## 2022-03-26 ASSESSMENT — PAIN - FUNCTIONAL ASSESSMENT: PAIN_FUNCTIONAL_ASSESSMENT: 0-10

## 2022-03-26 ASSESSMENT — PAIN DESCRIPTION - ORIENTATION: ORIENTATION: LEFT

## 2022-03-26 ASSESSMENT — PAIN DESCRIPTION - DESCRIPTORS: DESCRIPTORS: STABBING

## 2022-03-26 ASSESSMENT — PAIN DESCRIPTION - LOCATION: LOCATION: CHEST

## 2022-03-26 ASSESSMENT — PAIN SCALES - GENERAL: PAINLEVEL_OUTOF10: 10

## 2022-03-26 ASSESSMENT — PAIN DESCRIPTION - FREQUENCY: FREQUENCY: CONTINUOUS

## 2022-03-26 ASSESSMENT — PAIN DESCRIPTION - PAIN TYPE: TYPE: ACUTE PAIN

## 2022-03-27 ENCOUNTER — APPOINTMENT (OUTPATIENT)
Dept: CT IMAGING | Age: 62
End: 2022-03-27
Payer: MEDICAID

## 2022-03-27 ENCOUNTER — APPOINTMENT (OUTPATIENT)
Dept: MRI IMAGING | Age: 62
End: 2022-03-27
Payer: MEDICAID

## 2022-03-27 LAB
CHOLESTEROL/HDL RATIO: 3.7
CHOLESTEROL: 196 MG/DL
HDLC SERPL-MCNC: 53 MG/DL
LDL CHOLESTEROL: 99 MG/DL (ref 0–130)
SARS-COV-2, RAPID: NOT DETECTED
SPECIMEN DESCRIPTION: NORMAL
TRIGL SERPL-MCNC: 220 MG/DL

## 2022-03-27 PROCEDURE — 6360000004 HC RX CONTRAST MEDICATION: Performed by: STUDENT IN AN ORGANIZED HEALTH CARE EDUCATION/TRAINING PROGRAM

## 2022-03-27 PROCEDURE — 96376 TX/PRO/DX INJ SAME DRUG ADON: CPT

## 2022-03-27 PROCEDURE — 70498 CT ANGIOGRAPHY NECK: CPT

## 2022-03-27 PROCEDURE — G0378 HOSPITAL OBSERVATION PER HR: HCPCS

## 2022-03-27 PROCEDURE — 6370000000 HC RX 637 (ALT 250 FOR IP): Performed by: STUDENT IN AN ORGANIZED HEALTH CARE EDUCATION/TRAINING PROGRAM

## 2022-03-27 PROCEDURE — 80061 LIPID PANEL: CPT

## 2022-03-27 PROCEDURE — 2580000003 HC RX 258: Performed by: STUDENT IN AN ORGANIZED HEALTH CARE EDUCATION/TRAINING PROGRAM

## 2022-03-27 PROCEDURE — 83036 HEMOGLOBIN GLYCOSYLATED A1C: CPT

## 2022-03-27 PROCEDURE — 6370000000 HC RX 637 (ALT 250 FOR IP): Performed by: HEALTH CARE PROVIDER

## 2022-03-27 PROCEDURE — 96372 THER/PROPH/DIAG INJ SC/IM: CPT

## 2022-03-27 PROCEDURE — 70551 MRI BRAIN STEM W/O DYE: CPT

## 2022-03-27 PROCEDURE — 36415 COLL VENOUS BLD VENIPUNCTURE: CPT

## 2022-03-27 PROCEDURE — 6360000002 HC RX W HCPCS: Performed by: STUDENT IN AN ORGANIZED HEALTH CARE EDUCATION/TRAINING PROGRAM

## 2022-03-27 PROCEDURE — 6370000000 HC RX 637 (ALT 250 FOR IP)

## 2022-03-27 PROCEDURE — 99220 PR INITIAL OBSERVATION CARE/DAY 70 MINUTES: CPT | Performed by: PSYCHIATRY & NEUROLOGY

## 2022-03-27 RX ORDER — ALBUTEROL SULFATE 90 UG/1
2 AEROSOL, METERED RESPIRATORY (INHALATION) PRN
Status: ACTIVE | OUTPATIENT
Start: 2022-03-27 | End: 2022-03-27

## 2022-03-27 RX ORDER — ATROPINE SULFATE 0.1 MG/ML
0.5 INJECTION INTRAVENOUS EVERY 5 MIN PRN
Status: ACTIVE | OUTPATIENT
Start: 2022-03-27 | End: 2022-03-27

## 2022-03-27 RX ORDER — ONDANSETRON 4 MG/1
4 TABLET, ORALLY DISINTEGRATING ORAL EVERY 8 HOURS PRN
Status: DISCONTINUED | OUTPATIENT
Start: 2022-03-27 | End: 2022-03-28 | Stop reason: HOSPADM

## 2022-03-27 RX ORDER — SODIUM CHLORIDE 0.9 % (FLUSH) 0.9 %
5-40 SYRINGE (ML) INJECTION EVERY 12 HOURS SCHEDULED
Status: DISCONTINUED | OUTPATIENT
Start: 2022-03-27 | End: 2022-03-28 | Stop reason: HOSPADM

## 2022-03-27 RX ORDER — ATORVASTATIN CALCIUM 40 MG/1
40 TABLET, FILM COATED ORAL NIGHTLY
Status: DISCONTINUED | OUTPATIENT
Start: 2022-03-27 | End: 2022-03-28 | Stop reason: HOSPADM

## 2022-03-27 RX ORDER — ASPIRIN 81 MG/1
81 TABLET, CHEWABLE ORAL DAILY
Status: DISCONTINUED | OUTPATIENT
Start: 2022-03-27 | End: 2022-03-28 | Stop reason: HOSPADM

## 2022-03-27 RX ORDER — SODIUM CHLORIDE 0.9 % (FLUSH) 0.9 %
5-40 SYRINGE (ML) INJECTION PRN
Status: ACTIVE | OUTPATIENT
Start: 2022-03-27 | End: 2022-03-27

## 2022-03-27 RX ORDER — NITROGLYCERIN 0.4 MG/1
0.4 TABLET SUBLINGUAL EVERY 5 MIN PRN
Status: ACTIVE | OUTPATIENT
Start: 2022-03-27 | End: 2022-03-27

## 2022-03-27 RX ORDER — SODIUM CHLORIDE 9 MG/ML
500 INJECTION, SOLUTION INTRAVENOUS CONTINUOUS PRN
Status: ACTIVE | OUTPATIENT
Start: 2022-03-27 | End: 2022-03-27

## 2022-03-27 RX ORDER — METOPROLOL TARTRATE 5 MG/5ML
5 INJECTION INTRAVENOUS EVERY 5 MIN PRN
Status: ACTIVE | OUTPATIENT
Start: 2022-03-27 | End: 2022-03-27

## 2022-03-27 RX ORDER — POTASSIUM CHLORIDE 20 MEQ/1
40 TABLET, EXTENDED RELEASE ORAL ONCE
Status: COMPLETED | OUTPATIENT
Start: 2022-03-27 | End: 2022-03-27

## 2022-03-27 RX ORDER — ONDANSETRON 2 MG/ML
4 INJECTION INTRAMUSCULAR; INTRAVENOUS EVERY 6 HOURS PRN
Status: DISCONTINUED | OUTPATIENT
Start: 2022-03-27 | End: 2022-03-28 | Stop reason: HOSPADM

## 2022-03-27 RX ORDER — SODIUM CHLORIDE 0.9 % (FLUSH) 0.9 %
5-40 SYRINGE (ML) INJECTION PRN
Status: DISCONTINUED | OUTPATIENT
Start: 2022-03-27 | End: 2022-03-28 | Stop reason: HOSPADM

## 2022-03-27 RX ORDER — POTASSIUM CHLORIDE 7.45 MG/ML
40 INJECTION INTRAVENOUS ONCE
Status: DISCONTINUED | OUTPATIENT
Start: 2022-03-27 | End: 2022-03-27

## 2022-03-27 RX ORDER — ACETAMINOPHEN 325 MG/1
650 TABLET ORAL EVERY 4 HOURS PRN
Status: DISCONTINUED | OUTPATIENT
Start: 2022-03-27 | End: 2022-03-28 | Stop reason: HOSPADM

## 2022-03-27 RX ORDER — LISINOPRIL AND HYDROCHLOROTHIAZIDE 20; 12.5 MG/1; MG/1
1 TABLET ORAL DAILY
Status: DISCONTINUED | OUTPATIENT
Start: 2022-03-27 | End: 2022-03-28 | Stop reason: HOSPADM

## 2022-03-27 RX ORDER — DOCUSATE SODIUM 100 MG/1
100 CAPSULE, LIQUID FILLED ORAL DAILY PRN
Status: DISCONTINUED | OUTPATIENT
Start: 2022-03-27 | End: 2022-03-28 | Stop reason: HOSPADM

## 2022-03-27 RX ORDER — M-VIT,TX,IRON,MINS/CALC/FOLIC 27MG-0.4MG
1 TABLET ORAL DAILY
Status: DISCONTINUED | OUTPATIENT
Start: 2022-03-27 | End: 2022-03-28 | Stop reason: HOSPADM

## 2022-03-27 RX ORDER — SODIUM CHLORIDE 9 MG/ML
25 INJECTION, SOLUTION INTRAVENOUS PRN
Status: DISCONTINUED | OUTPATIENT
Start: 2022-03-27 | End: 2022-03-28 | Stop reason: HOSPADM

## 2022-03-27 RX ADMIN — ONDANSETRON 4 MG: 2 INJECTION INTRAMUSCULAR; INTRAVENOUS at 01:35

## 2022-03-27 RX ADMIN — POTASSIUM BICARBONATE 20 MEQ: 782 TABLET, EFFERVESCENT ORAL at 20:22

## 2022-03-27 RX ADMIN — ACETAMINOPHEN 650 MG: 325 TABLET ORAL at 11:48

## 2022-03-27 RX ADMIN — ENOXAPARIN SODIUM 30 MG: 100 INJECTION SUBCUTANEOUS at 20:22

## 2022-03-27 RX ADMIN — SODIUM CHLORIDE, PRESERVATIVE FREE 10 ML: 5 INJECTION INTRAVENOUS at 20:23

## 2022-03-27 RX ADMIN — IOPAMIDOL 90 ML: 755 INJECTION, SOLUTION INTRAVENOUS at 15:38

## 2022-03-27 RX ADMIN — ACETAMINOPHEN 650 MG: 325 TABLET ORAL at 16:36

## 2022-03-27 RX ADMIN — LISINOPRIL AND HYDROCHLOROTHIAZIDE 1 TABLET: 12.5; 2 TABLET ORAL at 08:39

## 2022-03-27 RX ADMIN — ENOXAPARIN SODIUM 40 MG: 40 INJECTION SUBCUTANEOUS at 08:39

## 2022-03-27 RX ADMIN — ASPIRIN 81 MG: 81 TABLET, CHEWABLE ORAL at 16:37

## 2022-03-27 RX ADMIN — SODIUM CHLORIDE, PRESERVATIVE FREE 10 ML: 5 INJECTION INTRAVENOUS at 08:40

## 2022-03-27 RX ADMIN — Medication 1 TABLET: at 08:39

## 2022-03-27 RX ADMIN — DESMOPRESSIN ACETATE 40 MG: 0.2 TABLET ORAL at 20:22

## 2022-03-27 RX ADMIN — POTASSIUM CHLORIDE 40 MEQ: 20 TABLET, EXTENDED RELEASE ORAL at 17:48

## 2022-03-27 ASSESSMENT — ENCOUNTER SYMPTOMS
COUGH: 0
NAUSEA: 1
VOMITING: 0
SHORTNESS OF BREATH: 1
WHEEZING: 0
ABDOMINAL PAIN: 0

## 2022-03-27 ASSESSMENT — PAIN SCALES - GENERAL
PAINLEVEL_OUTOF10: 0
PAINLEVEL_OUTOF10: 2
PAINLEVEL_OUTOF10: 0
PAINLEVEL_OUTOF10: 3
PAINLEVEL_OUTOF10: 5
PAINLEVEL_OUTOF10: 0

## 2022-03-27 ASSESSMENT — PAIN - FUNCTIONAL ASSESSMENT: PAIN_FUNCTIONAL_ASSESSMENT: PREVENTS OR INTERFERES SOME ACTIVE ACTIVITIES AND ADLS

## 2022-03-27 ASSESSMENT — PAIN DESCRIPTION - PAIN TYPE: TYPE: ACUTE PAIN

## 2022-03-27 NOTE — ED PROVIDER NOTES
[address]  ED  eMERGENCY dEPARTMENT eNCOUnter   Attending Attestation     Pt Name: Rosaura Villalba  MRN: [id number]  [place] 1960  Date of evaluation: 3/26/22       Rosaura Villalba is a 63 y.o. male who presents with Chest Pain (left side)      History: Presents with left chest pain starting prior to arrival.  Patient was driving earlier start having chest pain lightheadedness sweating shortness of breath. Patient is a pain radiates to the shoulder blade. Exam: Heart rate and rhythm are regular. Lungs are clear to auscultation bilaterally. Abdomen soft, nontender. Patient awake and alert and acting appropriately. Patient does like look like he is in pain. Plan for ACS work-up and admission. Concern for ACS. EKG shows no STEMI. I performed a history and physical examination of the patient and discussed management with the resident. I reviewed the residents note and agree with the documented findings and plan of care. Any areas of disagreement are noted on the chart. I was personally present for the key portions of any procedures. I have documented in the chart those procedures where I was not present during the key portions. I have personally reviewed all images and agree with the resident's interpretation. I have reviewed the emergency nurses triage note. I agree with the chief complaint, past medical history, past surgical history, allergies, medications, social and family history as documented unless otherwise noted below. Documentation of the HPI, Physical Exam and Medical Decision Making performed by medical students or scribes is based on my personal performance of the HPI, PE and MDM. For Phys Assistant/ Nurse Practitioner cases/documentation I have had a face to face evaluation of this patient and have completed at least one if not all key elements of the E/M (history, physical exam, and MDM). Additional findings are as noted.     For APC cases I have personally evaluated and examined the patient in conjunction with the APC and agree with the treatment plan and disposition of the patient as recorded by the APC.     Juanpablo Saha MD  Attending Emergency  Physician       Sylvia Santiago MD  03/26/22 2850

## 2022-03-27 NOTE — ED NOTES
The following labs labeled with pt sticker and tubed to lab:     [] Blue     [] Eliezer Gins   [] on ice  [] Green/yellow  [] Green/black [] on ice  [] Yellow  [] Red  [] Pink      [x] COVID-19 swab    [x] Rapid  [] PCR  [] Flu swab  [] Peds Viral Panel     [] Urine Sample  [] Pelvic Cultures  [] Blood Cultures            Sherry Hassan, RN  03/26/22 9644

## 2022-03-27 NOTE — ED NOTES
The following labs labeled with pt sticker and tubed to lab:     [] Blue     [x] Lavender   [] on ice  [x] Green/yellow  [] Green/black [] on ice  [] Yellow  [] Red  [] Pink      [] COVID-19 swab    [] Rapid  [] PCR  [] Flu swab  [] Peds Viral Panel     [] Urine Sample  [] Pelvic Cultures  [] Blood Cultures          Soniya Becerril RN  03/26/22 2716

## 2022-03-27 NOTE — ED NOTES
Pt's O2 dropped to 77% on 2L.  Pt placed on NRB at [address] [facility]  03/26/22 0925

## 2022-03-27 NOTE — PROGRESS NOTES
Marty Marroquin MD notified via [employer],   That room 344-2 results for MRI brain w/o contrast is back, cta head and neck with contrast is back, response, will take a look. Will continue to monitor for new results or orders.

## 2022-03-27 NOTE — PLAN OF CARE
Problem: Falls - Risk of:  Goal: Will remain free from falls  Description: Will remain free from falls  3/27/2022 0640 by Aurora Montiel RN  Outcome: Ongoing  3/27/2022 0215 by Desi Hernandez RN  Outcome: Ongoing  Goal: Absence of physical injury  Description: Absence of physical injury  3/27/2022 0640 by Aurora Montiel RN  Outcome: Ongoing  3/27/2022 0215 by Desi Hernandez RN  Outcome: Ongoing     Problem: Pain:  Goal: Pain level will decrease  Description: Pain level will decrease  3/27/2022 0640 by Aurora Montiel RN  Outcome: Ongoing  3/27/2022 0215 by Desi Hernandez RN  Outcome: Ongoing  Goal: Control of acute pain  Description: Control of acute pain  3/27/2022 0640 by Aurora Montiel RN  Outcome: Ongoing  3/27/2022 0215 by Desi Hernandez RN  Outcome: Ongoing

## 2022-03-27 NOTE — H&P
[address]  CDU / OBSERVATION ENCOUNTER  ATTENDING NOTE     Pt Name: El Limon  MRN: [id number]  [place] 1960  Date of evaluation: 3/27/22  Patient's PCP is :  CARMELA Buckley    CHIEF COMPLAINT       Chief Complaint   Patient presents with    Chest Pain     left side         HISTORY OF PRESENT ILLNESS    El Limon is a 63 y.o. male who presents with episodic left-sided chest pain. Patient has associated shortness of breath diaphoresis and anxiety with this. Patient reports began suddenly and relieved by resting. Patient has had episodes however while driving. Patient has also associated symptoms with visual changes. This is always unilateral on eye and feels like a \"shutter coming down\". Patient was admitted from the ER for further evaluation with stress testing but due to episodic tachycardia while in the observation unit patient has also had cardiology consulted. Patient gave symptoms consistent with TIA. Neurology also involved in decision-making. Location/Symptom: Episodic diaphoresis shortness of breath and chest discomfort. Also episodic visual changes. Timing/Onset: Acute exacerbation yesterday. Patient has had symptoms for weeks to months  Provocation: Unclear  Quality: 2 different components. Chest pain and diaphoresis seem most consistent with angina. Neurologic symptoms are intermittent and changing and associated with visual changes monocular and consistent with same eye. Radiation: None  Severity: Moderate  Timing/Duration: Brief on the order of minutes to an hour.   Modifying Factors: Unclear    REVIEW OF SYSTEMS        General ROS - No fevers, No malaise   Ophthalmic ROS -episodic vision changes  ENT ROS -  No sore throat, No rhinorrhea,   Respiratory ROS - no shortness of breath, no cough, no  wheezing  Cardiovascular ROS -episodic chest pain associated with palpitations and diaphoresis  Gastrointestinal ROS - No abdominal pain, no nausea or vomiting, no change in bowel habits, no black or bloody stools  Genito-Urinary ROS - No dysuria, trouble voiding, or hematuria  Musculoskeletal ROS - No myalgias, No arthalgias  Neurological ROS - No headache, no dizziness/lightheadedness, No focal weakness, no loss of sensation  Dermatological ROS - No lesions, No rash     (PQRS) Advance directives on face sheet per hospital policy. No change unless specifically mentioned in chart    PAST MEDICAL HISTORY    has a past medical history of Cancer ([address] Hematuria, Hypertension, Intermittent abdominal pain, Nerve injury, DILAN (obstructive sleep apnea), and Prediabetes. I have reviewed the past medical history with the patient and it is  pertinent to this complaint. SURGICAL HISTORY      has a past surgical history that includes Appendectomy; Cystoscopy (N/A, 02/07/2019); lymph node dissection (Right, 2004); Septoplasty; Prostate biopsy (N/A, 03/21/2019); Prostatectomy (N/A, 05/31/2019); Cystoscopy (N/A, 05/28/2020); Circumcision (N/A, 07/10/2020); Colonoscopy (N/A, 02/01/2021); hernia repair (06/25/2021); and hernia repair (N/A, 6/25/2021). I have reviewed and agree with Surgical History entered and it is  pertinent to this complaint.      CURRENT MEDICATIONS     docusate sodium (COLACE) capsule 100 mg, Daily PRN  lisinopril-hydroCHLOROthiazide (PRINZIDE;ZESTORETIC) 20-12.5 MG per tablet 1 tablet, Daily  therapeutic multivitamin-minerals 1 tablet, Daily  sodium chloride flush 0.9 % injection 5-40 mL, 2 times per day  sodium chloride flush 0.9 % injection 5-40 mL, PRN  0.9 % sodium chloride infusion, PRN  acetaminophen (TYLENOL) tablet 650 mg, Q4H PRN  ondansetron (ZOFRAN-ODT) disintegrating tablet 4 mg, Q8H PRN   Or  ondansetron (ZOFRAN) injection 4 mg, Q6H PRN  enoxaparin (LOVENOX) injection 30 mg, BID  atorvastatin (LIPITOR) tablet 40 mg, Nightly  aspirin chewable tablet 81 mg, Daily  potassium chloride 10 mEq/100 mL IVPB (Peripheral Line), Once  potassium bicarb-citric acid (EFFER-K) effervescent tablet 20 mEq, BID  nitroGLYCERIN (NITROSTAT) SL tablet 0.4 mg, Q5 Min PRN        All medication charted and reviewed. ALLERGIES     has No Known Allergies. FAMILY HISTORY     He indicated that his mother is alive. He indicated that his father is . He indicated that all of his four sisters are alive. He indicated that all of his four brothers are alive. He indicated that his maternal grandmother is . He indicated that his maternal grandfather is . He indicated that his paternal grandmother is . He indicated that his paternal grandfather is . He indicated that all of his three daughters are alive. He indicated that his son is alive. family history includes High Blood Pressure in his mother and sister; Lupus in his sister; No Known Problems in his brother, brother, brother, brother, father, maternal grandfather, maternal grandmother, paternal grandfather, paternal grandmother, sister, sister, and sister. The patient denies any pertinent family history. I have reviewed and agree with the family history entered. I have reviewed the Family History and it is not significant to the case    SOCIAL HISTORY      reports that he has never smoked. He has never used smokeless tobacco. He reports that he does not drink alcohol and does not use drugs. I have reviewed and agree with all Social.  There are no  concerns for substance abuse/use. PHYSICAL EXAM     INITIAL VITALS:  height is 5' 8\" (1.727 m) and weight is 240 lb (108.9 kg). His oral temperature is 98.3 °F (36.8 °C). His blood pressure is 162/78 (abnormal) and his pulse is 71. His respiration is 18 and oxygen saturation is 97%.       CONSTITUTIONAL: AOx4, no apparent distress, appears stated age     HEAD: normocephalic, atraumatic   EYES: PERRLA, EOMI    ENT: moist mucous membranes, uvula midline   NECK: supple, symmetric   BACK: symmetric   LUNGS: clear to auscultation bilaterally   CARDIOVASCULAR: regular rate and rhythm, no murmurs, rubs or gallops   ABDOMEN: soft, non-tender, non-distended with normal active bowel sounds   NEUROLOGIC:  MAEx4, no focal sensory or motor deficits   MUSCULOSKELETAL: no clubbing, cyanosis or edema   SKIN: no rash or wounds       DIFFERENTIAL DIAGNOSIS/MDM:     Patient with intermittent anginal symptoms. Patient also with intermittent tachycardia during observation stay. Patient admitted for stress testing but will include cardiology consult due to tachycardia. Patient currently comfortable. Patient also to have neurology consult. Symptoms not consistent with TIA. Appreciate neuro input. DIAGNOSTIC RESULTS     EKG: All EKG's are interpreted by the Observation Physician who either signs or Co-signs this chart in the absence of a cardiologist.    EKG Interpretation    Interpreted by observation physician    Rhythm: normal sinus   Rate: normal  Axis: normal  Ectopy: none  Conduction: normal  ST Segments: no acute change  T Waves: no acute change  Q Waves: none    Clinical Impression: no acute changes    Maliha Hudson MD         RADIOLOGY:   I directly visualized the following  images and reviewed the radiologist interpretations:    XR CHEST PORTABLE    Result Date: 3/26/2022  EXAM: XR Chest, 1 View EXAM DATE/TIME: 3/26/2022 9:32 pm CLINICAL HISTORY: ORDERING SYSTEM PROVIDED  chest pain  TECHNOLOGIST PROVIDED HISTORY:  chest pain  Reason for Exam: chest pain, nausea TECHNIQUE: Frontal view of the chest. COMPARISON: 08/09/2020 FINDINGS: Lungs:  No acute findings. No consolidation. Pleural space:  No acute findings. No pneumothorax. Heart:  No acute findings. No cardiomegaly. Mediastinum:  No acute findings. Bones/joints:  No acute findings.      No acute findings in the chest.     CT CHEST PULMONARY EMBOLISM W CONTRAST    Result Date: 3/26/2022  EXAMINATION: CTA OF THE CHEST 3/26/2022 8:10 pm TECHNIQUE: CTA of the chest was performed after the administration of intravenous contrast.  Multiplanar reformatted images are provided for review. MIP images are provided for review. Dose modulation, iterative reconstruction, and/or weight based adjustment of the mA/kV was utilized to reduce the radiation dose to as low as reasonably achievable. COMPARISON: Chest x-ray dated March 26, 2022, prior CT PA dated August 9, 2020 HISTORY: ORDERING SYSTEM PROVIDED HISTORY: Severe chest pain, shortness of breath, diaphoretic TECHNOLOGIST PROVIDED HISTORY: Severe chest pain, shortness of breath, diaphoretic Decision Support Exception - unselect if not a suspected or confirmed emergency medical condition->Emergency Medical Condition (MA) FINDINGS: Pulmonary Arteries: Pulmonary arteries are adequately opacified for evaluation. No evidence of intraluminal filling defect to suggest pulmonary embolism. Main pulmonary artery is normal in caliber. Mediastinum: No evidence of mediastinal lymphadenopathy. Cardiomegaly is present. The heart and pericardium demonstrate no acute abnormality. There is no acute abnormality of the thoracic aorta. Lungs/pleura: Dependent changes are present within the lung bases. No focal area of consolidation, pneumothorax, or pleural effusion is present. The trachea mainstem bronchi appear normal. Upper Abdomen: Images through the upper abdomen demonstrate diffuse hepatic steatosis. Soft Tissues/Bones: No acute bone or soft tissue abnormality. 1. No evidence of pulmonary embolism 2. Bibasilar dependent atelectasis 3. Cardiomegaly     CTA HEAD NECK W CONTRAST    Result Date: 3/27/2022  EXAMINATION: CTA OF THE HEAD AND NECK WITH CONTRAST 3/27/2022 3:29 pm: TECHNIQUE: CTA of the head and neck was performed with the administration of intravenous contrast. Multiplanar reformatted images are provided for review. MIP images are provided for review. Stenosis of the internal carotid arteries measured using NASCET criteria.  Dose modulation, iterative reconstruction, and/or weight based adjustment of the mA/kV was utilized to reduce the radiation dose to as low as reasonably achievable. COMPARISON: None. HISTORY: ORDERING SYSTEM PROVIDED HISTORY: possible TIA for carotid evaluation TECHNOLOGIST PROVIDED HISTORY: possible TIA for carotid evaluation Reason for Exam: possible tia for carotid evaluation FINDINGS: CTA NECK: AORTIC ARCH/ARCH VESSELS: No dissection or arterial injury. No significant stenosis of the brachiocephalic or subclavian arteries. CAROTID ARTERIES: No dissection, arterial injury, or hemodynamically significant stenosis by NASCET criteria. VERTEBRAL ARTERIES: No dissection, arterial injury, or significant stenosis in the dominant left vertebral artery. 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 SOFT TISSUES: The lung apices are clear. No cervical or superior mediastinal lymphadenopathy. The larynx and pharynx are unremarkable. No acute abnormality of the salivary and thyroid glands. Right submandibular gland appears surgically absent. BONES: No acute osseous abnormality. CTA HEAD: ANTERIOR CIRCULATION: No significant stenosis of the intracranial internal carotid, anterior cerebral, or middle cerebral arteries. No aneurysm. POSTERIOR CIRCULATION: Left vertebral artery and basilar artery are patent and normal in caliber. There is intermittent occlusion in the hypoplastic intracranial right vertebral artery. There is a fetal configuration of right PCA. The PCAs are patent. OTHER: Dural sinuses are not well opacified on this examination. .  There are multiple prominent venous structures over the right cerebral hemisphere. BRAIN: No mass effect or midline shift. No extra-axial fluid collection. The gray-white differentiation is maintained.      1. 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 This is age indeterminate. 2. No significant stenosis or occlusion of the cervical carotid arteries. 3. Otherwise, no large vessel occlusion intracranially. MRI BRAIN WO CONTRAST    Result Date: 3/27/2022  EXAMINATION: MRI OF THE BRAIN WITHOUT CONTRAST  3/27/2022 1:46 pm TECHNIQUE: Multiplanar multisequence MRI of the brain was performed without the administration of intravenous contrast. COMPARISON: 11/10/2017 HISTORY: ORDERING SYSTEM PROVIDED HISTORY: CVA TECHNOLOGIST PROVIDED HISTORY: CVA Reason for Exam: CVA Additional signs and symptoms: balance issues, headaches, chest pain, shorness of breath FINDINGS: INTRACRANIAL STRUCTURES/VENTRICLES: There is no acute infarct. No mass effect or midline shift. No evidence of an acute intracranial hemorrhage. The ventricles and sulci are normal in size and configuration. The sellar/suprasellar regions appear unremarkable. The normal signal voids within the major intracranial vessels appear maintained. There are scattered periventricular and deep subcortical white matter T2/FLAIR hyperintensities, consistent with microangiopathic change. There is mild parenchymal volume loss. Remote lacunar infarct in the left periventricular white matter. ORBITS: The visualized portion of the orbits demonstrate no acute abnormality. SINUSES: Scattered mucosal thickening in the paranasal sinuses. Partial opacification the right mastoid air cells. BONES/SOFT TISSUES: The bone marrow signal intensity appears normal. The soft tissues demonstrate no acute abnormality. 1. No acute intracranial abnormality. 2. Microangiopathic change. LABS:  I have reviewed and interpreted all available lab results.   Labs Reviewed   CBC WITH AUTO DIFFERENTIAL - Abnormal; Notable for the following components:       Result Value    Absolute Lymph # 4.12 (*)     All other components within normal limits   COMPREHENSIVE METABOLIC PANEL W/ REFLEX TO MG FOR LOW K - Abnormal; Notable for the following components:    Glucose 145 (*)     Sodium 134 (*)     Potassium 3.2 (*)     Chloride 96 (*)     ALT 48 (*)     All other components within normal limits   LIPID PANEL - Abnormal; Notable for the following components:    Triglycerides 220 (*)     All other components within normal limits   COVID-19, RAPID   TROPONIN   TROPONIN   BRAIN NATRIURETIC PEPTIDE   MAGNESIUM   CARBOXYHEMOGLOBIN   HEMOGLOBIN A1C         CDU IMPRESSION / PLAN      Melany Heart is a 63 y.o. male who presents with intermittent episodes of angina like symptoms also associated with visual changes. Rule out syncope, coronary artery disease, TIA,    · Anginal equivalents. · Intermittent symptoms associated with shortness of breath and diaphoresis. · Cardiology consulted. · Intermittent tachycardia with positional change. · Anticipating echo and possible tilt table test.  · Patient to receive ischemic work-up as well. · No EKG changes or elevation of enzymes  · TIA type symptoms  · Intermittent monocular visual disturbance with a \"shutter coming down\" quality. · In light of vertigo and associated symptoms will consider TIA and central cause. · Neurology consult. Appreciate input  · Advanced imaging performed. · Near syncope versus vertigo  · Patient has a difficult time differentiating between near syncope and vertigo. Symptoms possibly consistent with vertigo as opposed to syncope. · Will provide symptom relief for vertigo. Will investigate for syncope. · Patient with orthostatic changes noted on monitor when patient in room. · Continue home medications and pain control  · Monitor vitals, labs, and imaging  · DISPO: pending consults and clinical improvement    CONSULTS:    IP CONSULT TO NEUROLOGY  IP CONSULT TO CARDIOLOGY    PROCEDURES:  Not indicated        PATIENT REFERRED TO:    No follow-up provider specified.     --  Dhaval Chisholm Broderick Gutierrez MD   Emergency Medicine Attending    This dictation was generated by voice recognition computer software. Although all attempts are made to edit the dictation for accuracy, there may be errors in the transcription that are not intended.

## 2022-03-27 NOTE — PROGRESS NOTES
Pharmacy Note     Enoxaparin Dose Adjustment    Nakul Cabrales is a 63 y.o. male. Pharmacist assessment of enoxaparin dose for VTE prophylaxis. Recent Labs     03/26/22 2118   BUN 16       Recent Labs     03/26/22 2118   CREATININE 1.17       Estimated Creatinine Clearance: 81 mL/min (based on SCr of 1.17 mg/dL). Height:   Ht Readings from Last 1 Encounters:   03/26/22 5' 8\" (1.727 m)     Weight:  Wt Readings from Last 1 Encounters:   03/26/22 240 lb (108.9 kg)       The following enoxaparin dose has been adjusted based upon renal function and/or patient weight per P&T Guidelines:             Enoxaparin increased to 30mg BID based on weight 109kg.      Thank you,  Jeferson Reyez, PharmD 3/27/2022 12:41 PM

## 2022-03-27 NOTE — CARE COORDINATION
Case Management Initial Discharge Plan  [place],             Met with:patient to discuss discharge plans. Information verified: address, contacts, phone number, , insurance Yes  Insurance Provider: Rosas Nunes    Emergency Contact/Next of Kin name & number:   Antwon. Ragini Venegas  2. Liu Walden      Brother/Sister  Other [phone]       Who are involved in patient's support system? family    PCP: CARMELA Heaton  Date of last visit: 3 months      Discharge Planning    Living Arrangements:  [address] 2 stories  7 stairs to climb to get into front door, 15stairs to climb to reach second floor  Location of bedroom/bathroom in home upper    Patient able to perform ADL's:Independent    Current Services (outpatient & in home) none  DME equipment: none  DME provider: none    Is patient receiving oral anticoagulation therapy? No    If indicated:   Physician managing anticoagulation treatment: n/a  Where does patient obtain lab work for ATC treatment? n/a      Potential Assistance Needed:  N/A    Patient agreeable to home care: No  [place] of choice provided:  n/a    Prior SNF/Rehab Placement and Facility: none  Agreeable to SNF/Rehab: No  [place] of choice provided: n/a     Evaluation: no    Expected Discharge date:  22    Patient expects to be discharged to: If home: is the family and/or caregiver wiling & able to provide support at home? Lives alone  Who will be providing this support? family is supportive    Follow Up Appointment: Best Day/ Time: Wednesday AM    Transportation provider: family  Transportation arrangements needed for discharge: No    Readmission Risk              Risk of Unplanned Readmission:  0             Does patient have a readmission risk score greater than 14?: n/a  If yes, follow-up appointment must be made within 7 days of discharge.      Goals of Care: chest pain resolve      Educated pt on transitional options, provided freedom of choice and are agreeable with plan      Discharge Plan: Home independently,has transportation          Electronically signed by Emiliana Watts RN on 3/27/22 at 8:47 AM EDT

## 2022-03-27 NOTE — CONSULTS
Procedure Laterality Date    APPENDECTOMY      CIRCUMCISION N/A 07/10/2020    CIRCUMCISION performed by Angelina Boyd MD at [address] [facility] N/A 02/01/2021    COLONOSCOPY POLYPECTOMY HOT BIOPSY performed by Bea Elam MD at [facility] N/A 02/07/2019    CYSTOSCOPY performed by Angelina Boyd MD at [address] N/A 05/28/2020    CYSTOSCOPY performed by Angelina Boyd MD at [address]  06/25/2021    XI ROBOTIC [address] [place] N/A 6/25/2021    XI ROBOTIC [place] performed by Dallin Diaz IV, DO at [address] [facility] Right 2004    NECK    PROSTATE BIOPSY N/A 03/21/2019    PROSTATE BIOPSY, ULTRASOUND  (OFFICE TO CONTACT [place] performed by Angelina Boyd MD at [facility] 05/31/2019    XI [address], PELVIC LYMPH NODE DISSECTION performed by Angelina Boyd MD at [address] [facility]         Social History:  Hussain Sharma  reports that he has never smoked. He has never used smokeless tobacco. He reports that he does not drink alcohol and does not use drugs.     Family History   Problem Relation Age of Onset    High Blood Pressure Mother     No Known Problems Father     Lupus Sister     High Blood Pressure Sister     No Known Problems Brother     No Known Problems Maternal Grandmother     No Known Problems Maternal Grandfather     No Known Problems Paternal Grandmother     No Known Problems Paternal Grandfather     No Known Problems Sister     No Known Problems Sister     No Known Problems Sister     No Known Problems Brother     No Known Problems Brother     No Known Problems Brother        Medications:   lisinopril-hydroCHLOROthiazide  1 tablet Oral Daily    therapeutic multivitamin-minerals  1 tablet Oral Daily    sodium chloride flush  5-40 mL IntraVENous 2 times per day    enoxaparin  40 mg SubCUTAneous Daily       Allergies:   Marino Gold has No Known Allergies. ROS:   Constitutional Negative for fever and chills   HEENT Negative for ear discharge, ear pain, nosebleed   Eyes Negative for photophobia, pain and discharge   Respiratory Negative for hemoptysis and sputum   Cardiovascular Negative for orthopnea, claudication and PND   Gastrointestinal Negative for abdominal pain, diarrhea, blood in stool   Musculoskeletal Negative for joint pain, negative for myalgia   Skin Negative for rash or itching   hematology Negative for ecchymosis, anemia   Psychiatric Negative for suicidal ideation, anxiety, depression, hallucinations       Objective:   BP (!) 162/78   Pulse 71   Temp 98.3 °F (36.8 °C) (Oral)   Resp 18   Ht 5' 8\" (1.727 m)   Wt 240 lb (108.9 kg)   SpO2 97%   BMI 36.49 kg/m²         General examination:    HEENT: Normocephalic, atraumatic, neck supple (-)nuchal rigidity  Lungs: Respirations unlabored, chest wall no deformity  Heart: Regular rate rhythm  Abdomen: Soft, non distended, non tender  Extremities: No cyanosis, clubbing or edema, 2+ pulses  Skin: Intact, normal skin color, no ecchymosis    Neurological examination:    Mental status   Alert and oriented x 3; following all commands; speech is fluent, no dysarthria, aphasia.       Cranial nerves   II - visual fields intact to confrontation; pupils reactive  III, IV, VI - extraocular muscles intact; no KANDI; no nystagmus; no ptosis   V - normal facial sensation                                                               VII - normal facial symmetry                                                             VIII - intact hearing                                                                             IX, X - symmetrical palate elevation                                               XI - symmetrical shoulder shrug                                                       XII - midline tongue without atrophy or fasciculation Motor function  Strength: 5/5 RUE, 5/5 RLE, 5/5 LUE, 5/5  LLE  Normal bulk and tone. No tremors                      Sensory function Intact to touch, pin, vibration, proprioception throughout     Cerebellar Intact finger-nose-finger testing. Intact heel-shin testing. No dysdiadochokinesia present. Reflex function 2/4 symmetric throughout . Downgoing plantar response bilaterally. (-)Cardenas's sign bilaterally    Gait                  Normal station and gait           LABS:    CBC:   Recent Labs     03/26/22 2118   WBC 10.9   HGB 14.1        BMP:    Recent Labs     03/26/22 2118   *   K 3.2*   CL 96*   CO2 21   BUN 16   CREATININE 1.17   GLUCOSE 145*         Lab Results   Component Value Date    LDLCHOLESTEROL 123 09/09/2021    HDL 54 09/09/2021    ALT 48 (H) 03/26/2022    AST 38 03/26/2022    TSH 3.35 10/08/2020    LABA1C 6.1 (H) 09/09/2021    LABMICR 15 09/08/2021    UOASYXBD92 538 10/08/2020       No results found for: PHENYTOIN, PHENYTOIN, VALPROATE, CBMZ    IMAGING:   CT head -no intracranial abnormality    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 IMPRESSION:   62-year-old with PMH of hypertension, DILAN who presented with acute chest pain with diaphoresis. Patient had blurry vision accompanied with pain lasting for 15-20 minutes receiving the chest pain. He has had similar episodes in the past over the last several years usually occurring 1-2 times per week. No associated headaches or weakness or speech impairment. Etiology is unclear as MRI is negative, he does state that he has occasional shutters closing in on his left eye like a curtain, unclear if this is retinal artery occlusion, Amaurosis fugax is usually painless. He will need an ophthalmology evaluation and an echocardiogram is pending. In the interim we will continue with statins and antiplatelet therapy      Neurological-vision loss  -Continue to monitor for any neurological changes  -MRI-no acute ischemic injury  -A1c-6.1, LDL-123  -Continue statin therapy  -Aspirin 81 mg  -Echocardiogram pending  -Outpatient ophthalmology evaluation  -We will continue to follow. Thank you for this very interesting consultation.       Electronically signed by Deric Gamboa MD on 3/27/2022 at 11:23 AM      Deric Gamboa MD  [facility]  Neurology

## 2022-03-27 NOTE — ED PROVIDER NOTES
Faculty Sign-Out Attestation  Handoff taken on the following patient from prior Attending Physician: Praveen [place]    I was available and discussed any additional care issues that arose and coordinated the management plans with the resident(s) caring for the patient during my duty period. Any areas of disagreement with residents documentation of care or procedures are noted on the chart. I was personally present for the key portions of any/all procedures during my duty period. I have documented in the chart those procedures where I was not present during the key portions.     Cp, CO level pending,   Ct r/o pe pending, will need admit     Sharyle Callow, DO  Attending Physician     Sharyle Callow, DO  03/26/22 2303    Ct r/o pe -  Trop 14 & 15, observation admit  [address], DO  03/26/22 7660

## 2022-03-28 ENCOUNTER — APPOINTMENT (OUTPATIENT)
Dept: NUCLEAR MEDICINE | Age: 62
End: 2022-03-28
Payer: MEDICAID

## 2022-03-28 VITALS
RESPIRATION RATE: 20 BRPM | DIASTOLIC BLOOD PRESSURE: 70 MMHG | HEIGHT: 68 IN | TEMPERATURE: 97.9 F | HEART RATE: 83 BPM | SYSTOLIC BLOOD PRESSURE: 136 MMHG | OXYGEN SATURATION: 98 % | BODY MASS INDEX: 36.37 KG/M2 | WEIGHT: 240 LBS

## 2022-03-28 LAB
ANION GAP SERPL CALCULATED.3IONS-SCNC: 12 MMOL/L (ref 9–17)
BUN BLDV-MCNC: 13 MG/DL (ref 6–20)
C-REACTIVE PROTEIN: 6.6 MG/L (ref 0–5)
CALCIUM SERPL-MCNC: 8.6 MG/DL (ref 8.6–10.4)
CHLORIDE BLD-SCNC: 99 MMOL/L (ref 98–107)
CO2: 25 MMOL/L (ref 20–31)
CREAT SERPL-MCNC: 0.96 MG/DL (ref 0.7–1.2)
EKG ATRIAL RATE: 79 BPM
EKG P AXIS: 61 DEGREES
EKG P-R INTERVAL: 174 MS
EKG Q-T INTERVAL: 402 MS
EKG QRS DURATION: 82 MS
EKG QTC CALCULATION (BAZETT): 460 MS
EKG R AXIS: -6 DEGREES
EKG T AXIS: 10 DEGREES
EKG VENTRICULAR RATE: 79 BPM
ESTIMATED AVERAGE GLUCOSE: 143 MG/DL
GFR AFRICAN AMERICAN: >60 ML/MIN
GFR NON-AFRICAN AMERICAN: >60 ML/MIN
GFR SERPL CREATININE-BSD FRML MDRD: ABNORMAL ML/MIN/{1.73_M2}
GLUCOSE BLD-MCNC: 109 MG/DL (ref 70–99)
HBA1C MFR BLD: 6.6 % (ref 4–6)
LV EF: 52 %
LVEF MODALITY: NORMAL
MAGNESIUM: 2.1 MG/DL (ref 1.6–2.6)
POTASSIUM SERPL-SCNC: 4 MMOL/L (ref 3.7–5.3)
SEDIMENTATION RATE, ERYTHROCYTE: 11 MM/HR (ref 0–20)
SODIUM BLD-SCNC: 136 MMOL/L (ref 135–144)

## 2022-03-28 PROCEDURE — 96376 TX/PRO/DX INJ SAME DRUG ADON: CPT

## 2022-03-28 PROCEDURE — 93010 ELECTROCARDIOGRAM REPORT: CPT | Performed by: INTERNAL MEDICINE

## 2022-03-28 PROCEDURE — 6370000000 HC RX 637 (ALT 250 FOR IP): Performed by: STUDENT IN AN ORGANIZED HEALTH CARE EDUCATION/TRAINING PROGRAM

## 2022-03-28 PROCEDURE — 6360000002 HC RX W HCPCS: Performed by: STUDENT IN AN ORGANIZED HEALTH CARE EDUCATION/TRAINING PROGRAM

## 2022-03-28 PROCEDURE — 6370000000 HC RX 637 (ALT 250 FOR IP): Performed by: HEALTH CARE PROVIDER

## 2022-03-28 PROCEDURE — 36415 COLL VENOUS BLD VENIPUNCTURE: CPT

## 2022-03-28 PROCEDURE — 93017 CV STRESS TEST TRACING ONLY: CPT

## 2022-03-28 PROCEDURE — G0378 HOSPITAL OBSERVATION PER HR: HCPCS

## 2022-03-28 PROCEDURE — 3430000000 HC RX DIAGNOSTIC RADIOPHARMACEUTICAL: Performed by: STUDENT IN AN ORGANIZED HEALTH CARE EDUCATION/TRAINING PROGRAM

## 2022-03-28 PROCEDURE — 94760 N-INVAS EAR/PLS OXIMETRY 1: CPT

## 2022-03-28 PROCEDURE — 85652 RBC SED RATE AUTOMATED: CPT

## 2022-03-28 PROCEDURE — 78452 HT MUSCLE IMAGE SPECT MULT: CPT

## 2022-03-28 PROCEDURE — 80048 BASIC METABOLIC PNL TOTAL CA: CPT

## 2022-03-28 PROCEDURE — 99232 SBSQ HOSP IP/OBS MODERATE 35: CPT | Performed by: PSYCHIATRY & NEUROLOGY

## 2022-03-28 PROCEDURE — A9500 TC99M SESTAMIBI: HCPCS | Performed by: STUDENT IN AN ORGANIZED HEALTH CARE EDUCATION/TRAINING PROGRAM

## 2022-03-28 PROCEDURE — 96372 THER/PROPH/DIAG INJ SC/IM: CPT

## 2022-03-28 PROCEDURE — 2580000003 HC RX 258: Performed by: STUDENT IN AN ORGANIZED HEALTH CARE EDUCATION/TRAINING PROGRAM

## 2022-03-28 PROCEDURE — 86140 C-REACTIVE PROTEIN: CPT

## 2022-03-28 PROCEDURE — 83735 ASSAY OF MAGNESIUM: CPT

## 2022-03-28 RX ORDER — METOPROLOL TARTRATE 5 MG/5ML
5 INJECTION INTRAVENOUS EVERY 5 MIN PRN
Status: DISCONTINUED | OUTPATIENT
Start: 2022-03-28 | End: 2022-03-28

## 2022-03-28 RX ORDER — ALBUTEROL SULFATE 90 UG/1
2 AEROSOL, METERED RESPIRATORY (INHALATION) PRN
Status: DISCONTINUED | OUTPATIENT
Start: 2022-03-28 | End: 2022-03-28

## 2022-03-28 RX ORDER — NITROGLYCERIN 0.4 MG/1
TABLET SUBLINGUAL
Qty: 25 TABLET | Refills: 0 | Status: SHIPPED | OUTPATIENT
Start: 2022-03-28

## 2022-03-28 RX ORDER — ASPIRIN 81 MG/1
81 TABLET, CHEWABLE ORAL DAILY
Qty: 30 TABLET | Refills: 0 | Status: SHIPPED | OUTPATIENT
Start: 2022-03-29

## 2022-03-28 RX ORDER — ATORVASTATIN CALCIUM 40 MG/1
40 TABLET, FILM COATED ORAL NIGHTLY
Qty: 30 TABLET | Refills: 0 | Status: SHIPPED | OUTPATIENT
Start: 2022-03-28

## 2022-03-28 RX ORDER — ATROPINE SULFATE 0.1 MG/ML
0.5 INJECTION INTRAVENOUS EVERY 5 MIN PRN
Status: DISCONTINUED | OUTPATIENT
Start: 2022-03-28 | End: 2022-03-28

## 2022-03-28 RX ORDER — SODIUM CHLORIDE 0.9 % (FLUSH) 0.9 %
10 SYRINGE (ML) INJECTION PRN
Status: DISCONTINUED | OUTPATIENT
Start: 2022-03-28 | End: 2022-03-28 | Stop reason: HOSPADM

## 2022-03-28 RX ORDER — SODIUM CHLORIDE 9 MG/ML
500 INJECTION, SOLUTION INTRAVENOUS CONTINUOUS PRN
Status: DISCONTINUED | OUTPATIENT
Start: 2022-03-28 | End: 2022-03-28

## 2022-03-28 RX ORDER — SODIUM CHLORIDE 0.9 % (FLUSH) 0.9 %
5-40 SYRINGE (ML) INJECTION PRN
Status: DISCONTINUED | OUTPATIENT
Start: 2022-03-28 | End: 2022-03-28

## 2022-03-28 RX ORDER — NITROGLYCERIN 0.4 MG/1
0.4 TABLET SUBLINGUAL EVERY 5 MIN PRN
Status: DISCONTINUED | OUTPATIENT
Start: 2022-03-28 | End: 2022-03-28

## 2022-03-28 RX ADMIN — LISINOPRIL AND HYDROCHLOROTHIAZIDE 1 TABLET: 12.5; 2 TABLET ORAL at 12:04

## 2022-03-28 RX ADMIN — ENOXAPARIN SODIUM 30 MG: 100 INJECTION SUBCUTANEOUS at 12:05

## 2022-03-28 RX ADMIN — TETRAKIS(2-METHOXYISOBUTYLISOCYANIDE)COPPER(I) TETRAFLUOROBORATE 15.7 MILLICURIE: 1 INJECTION, POWDER, LYOPHILIZED, FOR SOLUTION INTRAVENOUS at 07:37

## 2022-03-28 RX ADMIN — ASPIRIN 81 MG: 81 TABLET, CHEWABLE ORAL at 12:04

## 2022-03-28 RX ADMIN — POTASSIUM BICARBONATE 20 MEQ: 782 TABLET, EFFERVESCENT ORAL at 12:05

## 2022-03-28 RX ADMIN — ONDANSETRON 4 MG: 2 INJECTION INTRAMUSCULAR; INTRAVENOUS at 00:09

## 2022-03-28 RX ADMIN — DOCUSATE SODIUM 100 MG: 100 CAPSULE, LIQUID FILLED ORAL at 00:15

## 2022-03-28 RX ADMIN — TETRAKIS(2-METHOXYISOBUTYLISOCYANIDE)COPPER(I) TETRAFLUOROBORATE 42 MILLICURIE: 1 INJECTION, POWDER, LYOPHILIZED, FOR SOLUTION INTRAVENOUS at 10:39

## 2022-03-28 RX ADMIN — Medication 1 TABLET: at 12:04

## 2022-03-28 RX ADMIN — SODIUM CHLORIDE, PRESERVATIVE FREE 10 ML: 5 INJECTION INTRAVENOUS at 09:00

## 2022-03-28 RX ADMIN — SODIUM CHLORIDE 250 ML: 9 INJECTION, SOLUTION INTRAVENOUS at 10:32

## 2022-03-28 RX ADMIN — SODIUM CHLORIDE, PRESERVATIVE FREE 10 ML: 5 INJECTION INTRAVENOUS at 10:39

## 2022-03-28 RX ADMIN — SODIUM CHLORIDE, PRESERVATIVE FREE 10 ML: 5 INJECTION INTRAVENOUS at 07:37

## 2022-03-28 RX ADMIN — SODIUM CHLORIDE, PRESERVATIVE FREE 10 ML: 5 INJECTION INTRAVENOUS at 10:03

## 2022-03-28 NOTE — PROGRESS NOTES
[facility] Neurology   [address] [facility]    Neurology Progress Note             Date:   3/28/2022  Patient name:  Cresencio Barone  Date of admission:  3/26/2022  9:09 PM  MRN:   [id number]  Account:  [de-identified]  YOB: 1960  PCP:    CARMELA Marshall  Room:   [address] [place] [address]  Code Status:    Full Code    Chief Complaint:     Chief Complaint   Patient presents with    Chest Pain     left side       Brief History of Present Illness:     70-year-old male admitted on 3/26/2022 with concern of chest pain and transient vision loss. Patient is borderline diabetic and has history of hypertension and sleep apnea. As per note, he was driving when he had sudden onset of blurry vision in his left eye which lasted for 15 to 20 minutes before he developed diaphoresis and chest pain accompanied by nausea and vomiting. The vomiting continued and patient pulled over and called his daughter to take him to the hospital.    As per patient the blurry vision lasted for 15 to 20 minutes and minutes was accompanied by pain. He has been having intermittent episodes of blurring of vision for 5 years and recurs nearly 1-2 times per week. He only wears reading glasses. NIH 0.  Loaded with aspirin and Plavix and was placed on statins. Patient occasionally does take aspirin at home. No family history of strokes or seizure patient is not a smoker and does not utilize alcohol. Patient has previously seen by an eye specialist but is unsure whether it was an ophthalmologist or optometrist.  CTA of the head and neck showed a hyperplastic rt vert. MRI was negative for acute ischemic injury. No noted history of MS. Patient has chronic mild left lower extremity weakness from prostate surgery. Plan for outpatient EMG for left leg weakness. At baseline patient is functional and able to perform his daily activities without any impairment.   He denies any headache, speech impairment or weakness. LDL 99. Hemoglobin A1c pending. Patient is scheduled for cardiac stress test on Monday and has been cleared by neurology. Stress test negative. Recommended ophthalmology consult. Past Medical History:     Past Medical History:   Diagnosis Date    Cancer [facility]) 2019    prostate    Hematuria     Hypertension     Intermittent abdominal pain     Nerve injury     left leg , states  s/p prostatectomy    DILAN (obstructive sleep apnea)     DOES NOT HAVE MACHINE    Prediabetes         Past Surgical History:     Past Surgical History:   Procedure Laterality Date    APPENDECTOMY      CIRCUMCISION N/A 07/10/2020    CIRCUMCISION performed by Bipin Cordoba MD at [address] [facility] N/A 02/01/2021    COLONOSCOPY POLYPECTOMY HOT BIOPSY performed by Tierney Richards MD at [place] [address] N/A 02/07/2019    CYSTOSCOPY performed by Bipin Cordoba MD at [address] [facility] N/A 05/28/2020    CYSTOSCOPY performed by Bipin Cordoba MD at [address] [facility]  06/25/2021    XI ROBOTIC [address] [facility] N/A 6/25/2021    XI ROBOTIC [facility] performed by Lisa Diaz IV, DO at [address] [facility] Right 2004    NECK    PROSTATE BIOPSY N/A 03/21/2019    PROSTATE BIOPSY, ULTRASOUND  (OFFICE TO CONTACT [place] performed by Bipin Cordoba MD at [facility] 05/31/2019    XI [address] [facility] [address], PELVIC LYMPH NODE DISSECTION performed by Bipin Cordoba MD at [address] [place] [address] Drive SEPTOPLASTY          Medications Prior to Admission:     Prior to Admission medications    Medication Sig Start Date End Date Taking?  Authorizing Provider   docusate sodium (COLACE) 100 MG capsule Take 1 capsule by mouth daily as needed for Constipation  Patient not taking: Reported on 3/27/2022 6/25/21   César Hanna DO   vitamin D (ERGOCALCIFEROL) 1.25 MG (20565 UT) CAPS capsule TK ONE CAPSULE PO ONCE A WEEK 20   Historical Provider, MD   Multiple Vitamins-Minerals (THERAPEUTIC MULTIVITAMIN-MINERALS) tablet Take 1 tablet by mouth daily    Historical Provider, MD   lisinopril-hydroCHLOROthiazide (PRINZIDE;ZESTORETIC) 20-12.5 MG per tablet Take 1 tablet by mouth daily    Historical Provider, MD        Allergies:     Patient has no known allergies. Social History:     Tobacco:    reports that he has never smoked. He has never used smokeless tobacco.  Alcohol:      reports no history of alcohol use. Drug Use:  reports no history of drug use. Family History:     Family History   Problem Relation Age of Onset    High Blood Pressure Mother     No Known Problems Father     Lupus Sister     High Blood Pressure Sister     No Known Problems Brother     No Known Problems Maternal Grandmother     No Known Problems Maternal Grandfather     No Known Problems Paternal Grandmother     No Known Problems Paternal Grandfather     No Known Problems Sister     No Known Problems Sister     No Known Problems Sister     No Known Problems Brother     No Known Problems Brother     No Known Problems Brother        Review of Systems:     Review of Systems    Physical Exam:   /70   Pulse 83   Temp 97.9 °F (36.6 °C)   Resp 20   Ht 5' 8\" (1.727 m)   Wt 240 lb (108.9 kg)   SpO2 98%   BMI 36.49 kg/m²   Temp (24hrs), Av °F (36.7 °C), Min:97.9 °F (36.6 °C), Max:98.1 °F (36.7 °C)    No results for input(s): POCGLU in the last 72 hours.   No intake or output data in the 24 hours ending 22 0816      Neurologic Exam     GENERAL  Appears comfortable and in no distress   HEENT  NC/ AT   HEART  S1 and S2 heard; palpation of pulses: radial pulse    NECK  Supple and no bruits heard   MENTAL STATUS:  Alert, oriented, intact memory, no confusion, normal speech, normal language, no hallucination or delusion   CRANIAL NERVES: II     -      Visual fields intact to confrontation  III,IV,VI -  PERR, EOMs full, no ptosis  V     -     Normal facial sensation   VII    -     Normal facial symmetry  VIII   -     Intact hearing   IX,X -     Symmetrical palate  XI    -     Symmetrical shoulder shrug  XII   -     Midline tongue, no atrophy    MOTOR FUNCTION: RUE: Significant for good strength of grade 5/5 in proximal and distal muscle groups   LUE: Significant for good strength of grade 5/5 in proximal and distal muscle groups   RLE: Significant for good strength of grade 5/5 in proximal and distal muscle groups   LLE: Significant for good strength of grade 4+/5 in proximal and distal muscle groups      Normal bulk, normal tone and no involuntary movements, no tremor   SENSORY FUNCTION:  Normal touch, normal pinprick, normal vibration, normal proprioception   CEREBELLAR FUNCTION:  Intact fine motor control over upper limbs and lower limbs   REFLEX FUNCTION:  Symmetric in upper and lower extremities, no Babinski sign   STATION and GAIT  Normal gait and tandem station, normal tip toes and heel walking       Investigations:      Laboratory Testing:  Recent Results (from the past 24 hour(s))   LIPID PANEL    Collection Time: 03/27/22  2:26 PM   Result Value Ref Range    Cholesterol 196 <200 mg/dL    HDL 53 >40 mg/dL    LDL Cholesterol 99 0 - 130 mg/dL    Chol/HDL Ratio 3.7 <5    Triglycerides 220 (H) <150 mg/dL   Basic Metabolic Panel    Collection Time: 03/28/22  3:22 AM   Result Value Ref Range    Glucose 109 (H) 70 - 99 mg/dL    BUN 13 6 - 20 mg/dL    CREATININE 0.96 0.70 - 1.20 mg/dL    Calcium 8.6 8.6 - 10.4 mg/dL    Sodium 136 135 - 144 mmol/L    Potassium 4.0 3.7 - 5.3 mmol/L    Chloride 99 98 - 107 mmol/L    CO2 25 20 - 31 mmol/L    Anion Gap 12 9 - 17 mmol/L    GFR Non-African American >60 >60 mL/min    GFR African American >60 >60 mL/min    GFR Comment         Magnesium    Collection Time: 03/28/22  3:22 AM   Result Value Ref Range    Magnesium 2.1 1.6 - 2.6 mg/dL     Recent Labs     03/26/22 2118   WBC 10.9   RBC 4.83   HGB 14.1   HCT 40.9   MCV 84.7   MCH 29.2   MCHC 34.5   RDW 12.6      MPV 11.1     Recent Labs     03/26/22 2118 03/26/22 2118 03/28/22  0322   *   < > 136   K 3.2*   < > 4.0   CL 96*   < > 99   CO2 21   < > 25   BUN 16   < > 13   CREATININE 1.17   < > 0.96   GLUCOSE 145*   < > 109*   CALCIUM 9.4   < > 8.6   PROT 7.5  --   --    LABALBU 4.4  --   --    BILITOT 0.94  --   --    ALKPHOS 65  --   --    AST 38  --   --    ALT 48*  --   --     < > = values in this interval not displayed. Hemoglobin A1C   Date Value Ref Range Status   09/09/2021 6.1 (H) 4.0 - 6.0 % Final       Assessment :      Primary Problem  Chest pain    Active Hospital Problems    Diagnosis Date Noted    Blurry vision, left eye [H53.8]     Chest pain [R07.9] 08/10/2020       63year-old male admitted on 3/26/2022 with concern of chest pain and transient vision loss. Patient is borderline diabetic and has history of hypertension and sleep apnea. As per note, he was driving when he had sudden onset of blurry vision in his left eye which lasted for 15 to 20 minutes before he developed diaphoresis and chest pain accompanied by nausea and vomiting. Chest pain of unknown etiology  Transient vision loss    Plan:     MRI brain unremarkable for any acute etiology or stroke. 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  Echocardiogram completed report pending. Negative cardiac stress test.   No additional workup form neurology. We can sign off. Inpatient ophthalmoplogy consultation. Ophthalmologist recommend patient can follow outpatient. Outpatient follow-up in neurology in 2 weeks. Chronic left leg weakness plan for outpatient EMG and follow-up. Neurology will sign off.   Please contact us for any additional questions  We will continue to follow along    Follow-up further recommendations after discussing the case with attending  The plan was discussed with the patient, patient's family and the medical staff. Consultations:   IP CONSULT TO NEUROLOGY  IP CONSULT TO CARDIOLOGY    Patient is admitted as inpatient status because of co-morbidities listed above, severity of signs and symptoms as outlined, requirement for current medical therapies and most importantly because of direct risk to patient if care not provided in a hospital setting.     Jeremiah Jimenez MD  3/28/2022  8:16 AM    Copy sent to CARMELA Quintana

## 2022-03-28 NOTE — DISCHARGE SUMMARY
CDU Discharge Summary        Patient:  Ben Diego  YOB: 1960    MRN: [id number]   Acct: [de-identified]    Primary Care Physician: CARMELA Monteiro    Admit date:  3/26/2022  9:09 PM  Discharge date: 3/28/2022  5:28 PM      Discharge Diagnoses:     Acute chest pain due to unstable angina  Improved with analgesia and cardiac risk stratification  Acute vision changes due to uncertain etiology  Improved with analgesia, neuro consult, outpatient ophthalmology consult    Follow-up:  Call today/tomorrow for a follow up appointment with CARMELA Monteiro , or return to the Emergency Room with worsening symptoms    Stressed to patient the importance of following up with primary care doctor for further workup/management of symptoms. Pt verbalizes understanding and agrees with plan. Discharge Medications:  Changes to medications           Medication List      START taking these medications    aspirin 81 MG chewable tablet  Take 1 tablet by mouth daily  Start taking on: March 29, 2022     atorvastatin 40 MG tablet  Commonly known as: LIPITOR  Take 1 tablet by mouth nightly     nitroGLYCERIN 0.4 MG SL tablet  Commonly known as: NITROSTAT  up to max of 3 total doses. If no relief after 1 dose, call 911.         CONTINUE taking these medications    docusate sodium 100 MG capsule  Commonly known as: COLACE  Take 1 capsule by mouth daily as needed for Constipation     lisinopril-hydroCHLOROthiazide 20-12.5 MG per tablet  Commonly known as: PRINZIDE;ZESTORETIC     therapeutic multivitamin-minerals tablet     vitamin D 1.25 MG (84811 UT) Caps capsule  Commonly known as: ERGOCALCIFEROL           Where to Get Your Medications      These medications were sent to [facility] [address]  [address] [facility], [address] [place] [id number]    Phone: [phone]   · aspirin 81 MG chewable tablet  · atorvastatin 40 MG tablet  · nitroGLYCERIN 0.4 MG SL tablet         Diet:  Diet NPO  ADULT DIET; Regular , Advance as tolerated     Activity:  As tolerated    Consultants: IP CONSULT TO NEUROLOGY  IP CONSULT TO CARDIOLOGY  IP CONSULT TO OPHTHALMOLOGY    Procedures: Stress test    Diagnostic Test:   Results for orders placed or performed during the hospital encounter of 03/26/22   COVID-19, Rapid    Specimen: Nasopharyngeal Swab   Result Value Ref Range    Specimen Description . NASOPHARYNGEAL SWAB     SARS-CoV-2, Rapid Not Detected Not Detected   CBC with Auto Differential   Result Value Ref Range    WBC 10.9 3.5 - 11.3 k/uL    RBC 4.83 4.21 - 5.77 m/uL    Hemoglobin 14.1 13.0 - 17.0 g/dL    Hematocrit 40.9 40.7 - 50.3 %    MCV 84.7 82.6 - 102.9 fL    MCH 29.2 25.2 - 33.5 pg    MCHC 34.5 28.4 - 34.8 g/dL    RDW 12.6 11.8 - 14.4 %    Platelets 513 988 - 456 k/uL    MPV 11.1 8.1 - 13.5 fL    NRBC Automated 0.0 0.0 per 100 WBC    Seg Neutrophils 51 36 - 65 %    Lymphocytes 38 24 - 43 %    Monocytes 8 3 - 12 %    Eosinophils % 2 1 - 4 %    Basophils 1 0 - 2 %    Immature Granulocytes 0 0 %    Segs Absolute 5.71 1.50 - 8.10 k/uL    Absolute Lymph # 4.12 (H) 1.10 - 3.70 k/uL    Absolute Mono # 0.84 0.10 - 1.20 k/uL    Absolute Eos # 0.17 0.00 - 0.44 k/uL    Basophils Absolute 0.06 0.00 - 0.20 k/uL    Absolute Immature Granulocyte 0.03 0.00 - 0.30 k/uL   Comprehensive Metabolic Panel w/ Reflex to MG   Result Value Ref Range    Glucose 145 (H) 70 - 99 mg/dL    BUN 16 6 - 20 mg/dL    CREATININE 1.17 0.70 - 1.20 mg/dL    Calcium 9.4 8.6 - 10.4 mg/dL    Sodium 134 (L) 135 - 144 mmol/L    Potassium 3.2 (L) 3.7 - 5.3 mmol/L    Chloride 96 (L) 98 - 107 mmol/L    CO2 21 20 - 31 mmol/L    Anion Gap 17 9 - 17 mmol/L    Alkaline Phosphatase 65 40 - 129 U/L    ALT 48 (H) 5 - 41 U/L    AST 38 <40 U/L    Total Bilirubin 0.94 0.3 - 1.2 mg/dL    Total Protein 7.5 6.4 - 8.3 g/dL    Albumin 4.4 3.5 - 5.2 g/dL    Albumin/Globulin Ratio 1.4 1.0 - 2.5    GFR Non-African American >60 >60 mL/min GFR African American >60 >60 mL/min    GFR Comment         Troponin   Result Value Ref Range    Troponin, High Sensitivity 14 0 - 22 ng/L   Troponin   Result Value Ref Range    Troponin, High Sensitivity 15 0 - 22 ng/L   Brain Natriuretic Peptide   Result Value Ref Range    Pro-BNP 87 <300 pg/mL   Magnesium   Result Value Ref Range    Magnesium 2.0 1.6 - 2.6 mg/dL   Carboxyhemoglobin   Result Value Ref Range    Carboxyhemoglobin 1.5 0 - 5 %   Hemoglobin A1C   Result Value Ref Range    Hemoglobin A1C 6.6 (H) 4.0 - 6.0 %    Estimated Avg Glucose 143 mg/dL   LIPID PANEL   Result Value Ref Range    Cholesterol 196 <200 mg/dL    HDL 53 >40 mg/dL    LDL Cholesterol 99 0 - 130 mg/dL    Chol/HDL Ratio 3.7 <5    Triglycerides 220 (H) <150 mg/dL   Basic Metabolic Panel   Result Value Ref Range    Glucose 109 (H) 70 - 99 mg/dL    BUN 13 6 - 20 mg/dL    CREATININE 0.96 0.70 - 1.20 mg/dL    Calcium 8.6 8.6 - 10.4 mg/dL    Sodium 136 135 - 144 mmol/L    Potassium 4.0 3.7 - 5.3 mmol/L    Chloride 99 98 - 107 mmol/L    CO2 25 20 - 31 mmol/L    Anion Gap 12 9 - 17 mmol/L    GFR Non-African American >60 >60 mL/min    GFR African American >60 >60 mL/min    GFR Comment         Magnesium   Result Value Ref Range    Magnesium 2.1 1.6 - 2.6 mg/dL   Sedimentation Rate   Result Value Ref Range    Sed Rate 11 0 - 20 mm/Hr   C-Reactive Protein   Result Value Ref Range    CRP 6.6 (H) 0.0 - 5.0 mg/L   EKG 12 Lead   Result Value Ref Range    Ventricular Rate 79 BPM    Atrial Rate 79 BPM    P-R Interval 174 ms    QRS Duration 82 ms    Q-T Interval 402 ms    QTc Calculation (Bazett) 460 ms    P Axis 61 degrees    R Axis -6 degrees    T Axis 10 degrees     XR CHEST PORTABLE    Result Date: 3/26/2022  EXAM: XR Chest, 1 View EXAM DATE/TIME: 3/26/2022 9:32 pm CLINICAL HISTORY: ORDERING SYSTEM PROVIDED  chest pain  TECHNOLOGIST PROVIDED HISTORY:  chest pain  Reason for Exam: chest pain, nausea TECHNIQUE: Frontal view of the chest. COMPARISON: 08/09/2020 FINDINGS: Lungs:  No acute findings. No consolidation. Pleural space:  No acute findings. No pneumothorax. Heart:  No acute findings. No cardiomegaly. Mediastinum:  No acute findings. Bones/joints:  No acute findings. No acute findings in the chest.     CT CHEST PULMONARY EMBOLISM W CONTRAST    Result Date: 3/26/2022  EXAMINATION: CTA OF THE CHEST 3/26/2022 8:10 pm TECHNIQUE: CTA of the chest was performed after the administration of intravenous contrast.  Multiplanar reformatted images are provided for review. MIP images are provided for review. Dose modulation, iterative reconstruction, and/or weight based adjustment of the mA/kV was utilized to reduce the radiation dose to as low as reasonably achievable. COMPARISON: Chest x-ray dated March 26, 2022, prior CT PA dated August 9, 2020 HISTORY: ORDERING SYSTEM PROVIDED HISTORY: Severe chest pain, shortness of breath, diaphoretic TECHNOLOGIST PROVIDED HISTORY: Severe chest pain, shortness of breath, diaphoretic Decision Support Exception - unselect if not a suspected or confirmed emergency medical condition->Emergency Medical Condition (MA) FINDINGS: Pulmonary Arteries: Pulmonary arteries are adequately opacified for evaluation. No evidence of intraluminal filling defect to suggest pulmonary embolism. Main pulmonary artery is normal in caliber. Mediastinum: No evidence of mediastinal lymphadenopathy. Cardiomegaly is present. The heart and pericardium demonstrate no acute abnormality. There is no acute abnormality of the thoracic aorta. Lungs/pleura: Dependent changes are present within the lung bases. No focal area of consolidation, pneumothorax, or pleural effusion is present. The trachea mainstem bronchi appear normal. Upper Abdomen: Images through the upper abdomen demonstrate diffuse hepatic steatosis. Soft Tissues/Bones: No acute bone or soft tissue abnormality.      1. No evidence of pulmonary embolism 2. Bibasilar dependent atelectasis 3. Cardiomegaly     CTA HEAD NECK W CONTRAST    Result Date: 3/27/2022  EXAMINATION: CTA OF THE HEAD AND NECK WITH CONTRAST 3/27/2022 3:29 pm: TECHNIQUE: CTA of the head and neck was performed with the administration of intravenous contrast. Multiplanar reformatted images are provided for review. MIP images are provided for review. Stenosis of the internal carotid arteries measured using NASCET criteria. Dose modulation, iterative reconstruction, and/or weight based adjustment of the mA/kV was utilized to reduce the radiation dose to as low as reasonably achievable. COMPARISON: None. HISTORY: ORDERING SYSTEM PROVIDED HISTORY: possible TIA for carotid evaluation TECHNOLOGIST PROVIDED HISTORY: possible TIA for carotid evaluation Reason for Exam: possible tia for carotid evaluation FINDINGS: CTA NECK: AORTIC ARCH/ARCH VESSELS: No dissection or arterial injury. No significant stenosis of the brachiocephalic or subclavian arteries. CAROTID ARTERIES: No dissection, arterial injury, or hemodynamically significant stenosis by NASCET criteria. VERTEBRAL ARTERIES: No dissection, arterial injury, or significant stenosis in the dominant left vertebral artery. 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 SOFT TISSUES: The lung apices are clear. No cervical or superior mediastinal lymphadenopathy. The larynx and pharynx are unremarkable. No acute abnormality of the salivary and thyroid glands. Right submandibular gland appears surgically absent. BONES: No acute osseous abnormality. CTA HEAD: ANTERIOR CIRCULATION: No significant stenosis of the intracranial internal carotid, anterior cerebral, or middle cerebral arteries. No aneurysm. POSTERIOR CIRCULATION: Left vertebral artery and basilar artery are patent and normal in caliber. There is intermittent occlusion in the hypoplastic intracranial right vertebral artery.   There is a fetal configuration of right PCA. The PCAs are patent. OTHER: Dural sinuses are not well opacified on this examination. .  There are multiple prominent venous structures over the right cerebral hemisphere. BRAIN: No mass effect or midline shift. No extra-axial fluid collection. The gray-white differentiation is maintained. 1. 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 This is age indeterminate. 2. No significant stenosis or occlusion of the cervical carotid arteries. 3. Otherwise, no large vessel occlusion intracranially. MRI BRAIN WO CONTRAST    Result Date: 3/27/2022  EXAMINATION: MRI OF THE BRAIN WITHOUT CONTRAST  3/27/2022 1:46 pm TECHNIQUE: Multiplanar multisequence MRI of the brain was performed without the administration of intravenous contrast. COMPARISON: 11/10/2017 HISTORY: ORDERING SYSTEM PROVIDED HISTORY: CVA TECHNOLOGIST PROVIDED HISTORY: CVA Reason for Exam: CVA Additional signs and symptoms: balance issues, headaches, chest pain, shorness of breath FINDINGS: INTRACRANIAL STRUCTURES/VENTRICLES: There is no acute infarct. No mass effect or midline shift. No evidence of an acute intracranial hemorrhage. The ventricles and sulci are normal in size and configuration. The sellar/suprasellar regions appear unremarkable. The normal signal voids within the major intracranial vessels appear maintained. There are scattered periventricular and deep subcortical white matter T2/FLAIR hyperintensities, consistent with microangiopathic change. There is mild parenchymal volume loss. Remote lacunar infarct in the left periventricular white matter. ORBITS: The visualized portion of the orbits demonstrate no acute abnormality. SINUSES: Scattered mucosal thickening in the paranasal sinuses. Partial opacification the right mastoid air cells.  BONES/SOFT TISSUES: The bone marrow signal intensity appears normal. The soft tissues demonstrate no acute abnormality. 1. No acute intracranial abnormality. 2. Microangiopathic change. NM Cardiac Stress Test Nuclear Imaging    Result Date: 3/28/2022  EXAMINATION: MYOCARDIAL PERFUSION IMAGING 3/28/2022 9:22 am TECHNIQUE: For the rest study, 15.7 mCi of Tc 99 labeled sestamibi were injected. SPECT images were acquired. Under cardiology supervision, 0.4mg [place] Oas was infused. After pharmacologic stress, 42 mCi of Tc 99 labeled sestamibi were injected. SPECT images with ECG gating were acquired. COMPARISON: None Available. HISTORY: ORDERING SYSTEM PROVIDED HISTORY: Chest pain TECHNOLOGIST PROVIDED HISTORY: Reason for Exam: Chest pain Procedure Type->Exercise chest pain Reason for Exam: chest pain, HTN, shortness of breath diaphoresis and anxiety Additional signs and symptoms: Prediabetes. FINDINGS: Images interpreted utilizing [employer] system and [employer]. There is no evidence for a significant reversible or fixed perfusion defect. The gated images show no wall motion abnormalities. Normal myocardial thickening. Perfusion scores are visually adjusted to account for artifact. Summed stress score:  0 Summed rest score:  0 Summed reversibility score:  0 Function: End diastolic volume:  23RU Left ventricular ejection fraction:  52% TID score:  0.97 (scores greater than 1.39 are considered elevated for Lexiscan stress with Tc99m) Notes concerning risk stratification: Risk stratification incorporates both clinical history and some testing results. Final risk determination is the responsibility of the ordering provider as other patient information and test results may increase or decrease the risk assessment reported for this examination. Risk stratification criteria are adapted from \"Noninvasive Risk Stratification\" criteria from Maggie Kee.   [place], ACC/AATS/AHA/ASE/ASNC/SCAI/SCCT/STS 2017 Appropriate Use Criteria For Coronary Revascularization in Patients With Stable Ischemic Heart Disease [facility] Volume 69, Issue 17, May 2017 High risk (>3% annual death or MI) 1. Severe resting LV dysfunction (LVEF <35%) not readily explained by non coronary causes 2. Resting perfusion abnormalities greater than 10% of the myocardium in patients without prior history or evidence of MI 3. Stress-induced perfusion abnormalities encumbering greater than or equal to 10% myocardium or stress segmental scores indicating multiple vascular territories with abnormalities 4. Stress-induced LV dilatation (TID ratio greater than 1.19 for exercise and greater than 1.39 for regadenoson) Intermediate risk (1% to 3% annual death or MI) 1. Mild/moderate resting LV dysfunction (LVEF 35% to 49%) not readily explained by non coronary causes. 2. Resting perfusion abnormalities in 5%-9.9% of the myocardium in patients without a history or prior evidence of MI 3. Stress-induced perfusion abnormality encumbering 5%-9.9% of the myocardium or stress segmental scores indicating 1 vascular territory with abnormalities but without LV dilation 4. Small wall motion abnormality involving 1-2 segments and only 1 coronary bed. Low Risk (Less than 1% annual death or MI) 1. Normal or small myocardial perfusion defect at rest or with stress encumbering less than 5% of the myocardium. Normal study. Risk stratification: Low           Physical Exam:    General appearance - NAD, AOx 3  Lungs -CTAB, no R/R/R  Heart - RRR, no M/R/G  Abdomen - Soft, NT/ND  Neurological:  MAEx4, No focal motor deficit, sensory loss  Extremities - Cap refil <2 sec in all ext., no edema  Skin -warm, dry      Hospital Course:  Clinical course has improved, labs and imaging reviewed. Rony Ramos originally presented to the hospital on 3/26/2022  9:09 PM. with episodic left-sided chest pain with shortness of breath and diaphoresis associated with unilateral vision loss.   At that time it was determined that He required further observation and cardiology and neuro work-ups. He was admitted and labs and imaging were followed daily. Imaging results as above. Cardiology work-up showing no EKG changes, normal enzymes, stress test low risk. MRI brain negative. 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 Neuro recommending outpatient follow-up in 2 weeks. Neuro recommending ophthalmology consult, preferably inpatient, however patient is eager to be discharged and will follow up with ophthalmology outpatient tomorrow morning. Patient instructed that he must call Dr. Tyrone Summers ophthalmology office tomorrow morning to schedule a same-day follow-up appointment, and that if he is unable to obtain a full appointment with the ophthalmologist he should return to the emergency department for further evaluation. He is medically stable to be discharged. Disposition: Home    Patient stated that they will not drive themselves home from the hospital if they have gotten pain killers/ narcotics earlier that day and that they will arrange for transportation on their own or work with the  for a ride. Patient counseled NOT to drive while under the influence of narcotics/ pain killers. Condition: Good    Patient stable and ready for discharge home. I have discussed plan of care with patient and they are in understanding. They were instructed to read discharge paperwork. All of their questions and concerns were addressed. Time Spent: 0 day      --  Stephany Lozada MD  Emergency Medicine Resident Physician    This dictation was generated by voice recognition computer software. Although all attempts are made to edit the dictation for accuracy, there may be errors in the transcription that are not intended.

## 2022-03-28 NOTE — PROGRESS NOTES
OBS/CDU   RESIDENT NOTE      Patients PCP is:  CARMELA Marshall        SUBJECTIVE      No acute events overnight. Has been able to tolerate a full diet without nausea or vomiting. Patient states his chest pain is still intermittently present. Still endorsing intermittent changes and vision including unilateral vision loss. Patient scheduled for stress test today, had MRI yesterday per neurology recommendation. PHYSICAL EXAM      General: NAD, AO X 3  Heent: EMOI, PERRL  Neck: SUPPLE, NO JVD  Cardiovascular: RRR, S1S2  Pulmonary: CTAB, NO SOB  Abdomen: SOFT, NTTP, ND, +BS  Extremities: +2/4 PULSES DISTAL, NO SWELLING  Neuro / Psych: NO NUMBNESS OR TINGLING, MENTATION AT BASELINE    PERTINENT TEST /EXAMS      I have reviewed all available laboratory results. MEDICATIONS CURRENT   sodium chloride flush 0.9 % injection 10 mL, PRN  sodium chloride flush 0.9 % injection 10 mL, PRN  docusate sodium (COLACE) capsule 100 mg, Daily PRN  lisinopril-hydroCHLOROthiazide (PRINZIDE;ZESTORETIC) 20-12.5 MG per tablet 1 tablet, Daily  therapeutic multivitamin-minerals 1 tablet, Daily  sodium chloride flush 0.9 % injection 5-40 mL, 2 times per day  sodium chloride flush 0.9 % injection 5-40 mL, PRN  0.9 % sodium chloride infusion, PRN  acetaminophen (TYLENOL) tablet 650 mg, Q4H PRN  ondansetron (ZOFRAN-ODT) disintegrating tablet 4 mg, Q8H PRN   Or  ondansetron (ZOFRAN) injection 4 mg, Q6H PRN  enoxaparin (LOVENOX) injection 30 mg, BID  atorvastatin (LIPITOR) tablet 40 mg, Nightly  aspirin chewable tablet 81 mg, Daily  potassium bicarb-citric acid (EFFER-K) effervescent tablet 20 mEq, BID  nitroGLYCERIN (NITROSTAT) SL tablet 0.4 mg, Q5 Min PRN        All medication charted and reviewed.     CONSULTS      IP CONSULT TO NEUROLOGY  IP CONSULT TO CARDIOLOGY  IP CONSULT TO OPHTHALMOLOGY    ASSESSMENT/PLAN       Cresencio Barone is a 63 y.o. male who presents with intermittent angina-like chest pain associated with shortness of breath diaphoresis, and associated unilateral vision loss. Plan for stress test today with cardiology. 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 Pending possible tilt table test.    · Continue symptom management  · Plan for stress test today per cardiology  · Follow up neurology recommendations  · Consider tilt table test if remainder of work-up is negative  · Continue home medications and pain control  · Monitor vitals, labs, and imaging  · DISPO: pending consults and clinical improvement    --  Neelam Hernandez MD  Emergency Medicine Resident Physician     This dictation was generated by voice recognition computer software. Although all attempts are made to edit the dictation for accuracy, there may be errors in the transcription that are not intended.

## 2022-03-28 NOTE — PROGRESS NOTES
Discharge teaching and instructions completed with patient using teachback method. AVS reviewed. Prescriptions called in for patient. Patient voiced understanding regarding prescriptions, follow up appointments, including Ophthalmologic in morning and care of self at home. Discharged home. All questions answered.

## 2022-03-28 NOTE — PLAN OF CARE
Stress reviewed    Impression       Normal study.       Risk stratification: Low       OK for discharge from CV standpoint. Please call with questions/concerns.

## 2022-03-28 NOTE — PROCEDURES
[address] [facility] [address]                              CARDIAC STRESS TEST    PATIENT NAME: Flako Tanner                   :        1960  MED REC NO:   [id number]                             ROOM:       [id number]  ACCOUNT NO:   [de-identified]                           ADMIT DATE: 2022  PROVIDER:     Geraldo Blackwood    DATE OF STUDY:  2022    ORDERING PROVIDER: Dr. Constance Augustine    PRIMARY CARE PROVIDER: CARMELA Hogan    INTERPRETING PHYSICIAN: Dr. Laureen Chow    Indication: Chest pain    Procedure explained and consent signed. Resting heart rate: 77 bpm  Resting blood pressure:  101/79 mm/Hg  Resting EKG: Sinus rhythm 77 bpm  Maximum heart rate:  146 bpm, or 84% of age predicted maximum heart rate  Peak blood pressure:  193/56 mm/Hg  Duration: 8:07 minutes  METS: 10.10  Reason for termination: Fatigue  Stress heart response: Normal Response  Stress Blood pressure response: Appropriate  Stress EKG'S: Normal  Chest discomfort: None  Ischemic EKG changes: None, no arrhythmia    IMPRESSION:  Electrocardiographically Negative treadmill stress study. Radio-isotope  results to follow from the department of Nuclear Medicine.        Catherine Carranza    D: 2022 14:48:20       T: 2022 14:49:26     [id number]  Job#: [id number]     Doc#: Unknown    CC:

## 2022-03-28 NOTE — PROGRESS NOTES
[facility]  CDU / OBSERVATION ENCOUNTER  ATTENDING NOTE       I performed a history and physical examination of the patient and discussed management with the resident or midlevel provider. I reviewed the resident or midlevel provider's note and agree with the documented findings and plan of care. Any areas of disagreement are noted on the chart. I was personally present for the key portions of any procedures. I have documented in the chart those procedures where I was not present during the key portions. I have reviewed the nurses notes. I agree with the chief complaint, past medical history, past surgical history, allergies, medications, social and family history as documented unless otherwise noted below. The Family history, social history, and ROS are effectively unchanged since admission unless noted elsewhere in the chart. Patient admitted to the ETU for chest pain. Cardiology was consulted over the weekend and a stress test is planned for this morning. Patient had also been complaining of loss of vision to the left eye that occurs off and on. Neurology was also consulted and and a CTA of the head and neck was done yesterday which shows complete occlusion of the right vertebral artery. Patient has no new complaints today. Will await neurology recommendations based on the CTA results. We will also await results of the stress test and any further cardiology recommendations.     Mali Winn MD  Attending Emergency  Physician

## 2022-03-28 NOTE — CONSULTS
Attestation signed by      Attending Physician Statement:    I have discussed the care of  Marleen Vasquez , including pertinent history and exam findings, with the Cardiology fellow/resident. I have seen and examined the patient and the key elements of all parts of the encounter have been performed by me. I agree with the assessment, plan and orders as documented by the fellow/resident, after I modified exam findings and plan of treatments, and the final version is my approved version of the assessment. Additional Comments:   Chest pain. No acute ECG changes. We will follow up on stress test.           [facility]    Consult / H&P               Today's Date: 3/28/2022  Patient Name: Marleen Vasquez  Date of admission: 3/26/2022  9:09 PM  Patient's age: 63 y.o., 1960  Admission Dx: Chest pain [R07.9]  Chest pain, unspecified type [R07.9]    Reason for Consult:  Cardiac evaluation    Requesting Physician: Nicolas Allan MD    CHIEF COMPLAINT:  Chest pain    History Obtained From:  patient    HISTORY OF PRESENT ILLNESS:      The patient is a 63 y.o.  male pmh HTN, prostate cancer s/p prostatectomy, DILAN who is admitted to the hospital for chest pain that has been going on for months but recently worsened. Patient states that he was driving when he had sudden loss of vison in his left eye and squeezing chest pain that radiated to his left arm. Patient then go diaphoretic and vomited. He did not have LOC. Past cardiac hx significant for a stress test in 2020 that was negative. His imaging does not show any acute pathology. troponins are negative labs otherwise unremarkable. No smoking, alcohol or illegal drugs     Past Medical History:   has a past medical history of Cancer ([address] Hematuria, Hypertension, Intermittent abdominal pain, Nerve injury, DILAN (obstructive sleep apnea), and Prediabetes.     Past Surgical History:   has a past surgical history that includes Appendectomy; Cystoscopy (N/A, 02/07/2019); lymph node dissection (Right, 2004); Septoplasty; Prostate biopsy (N/A, 03/21/2019); Prostatectomy (N/A, 05/31/2019); Cystoscopy (N/A, 05/28/2020); Circumcision (N/A, 07/10/2020); Colonoscopy (N/A, 02/01/2021); hernia repair (06/25/2021); and hernia repair (N/A, 6/25/2021). Home Medications:    Prior to Admission medications    Medication Sig Start Date End Date Taking?  Authorizing Provider   docusate sodium (COLACE) 100 MG capsule Take 1 capsule by mouth daily as needed for Constipation  Patient not taking: Reported on 3/27/2022 6/25/21   Cata Barclay DO   vitamin D (ERGOCALCIFEROL) 1.25 MG (50394 UT) CAPS capsule TK ONE CAPSULE PO ONCE A WEEK 11/22/20   Historical Provider, MD   Multiple Vitamins-Minerals (THERAPEUTIC MULTIVITAMIN-MINERALS) tablet Take 1 tablet by mouth daily    Historical Provider, MD   lisinopril-hydroCHLOROthiazide (PRINZIDE;ZESTORETIC) 20-12.5 MG per tablet Take 1 tablet by mouth daily    Historical Provider, MD      Current Facility-Administered Medications: docusate sodium (COLACE) capsule 100 mg, 100 mg, Oral, Daily PRN  lisinopril-hydroCHLOROthiazide (PRINZIDE;ZESTORETIC) 20-12.5 MG per tablet 1 tablet, 1 tablet, Oral, Daily  therapeutic multivitamin-minerals 1 tablet, 1 tablet, Oral, Daily  sodium chloride flush 0.9 % injection 5-40 mL, 5-40 mL, IntraVENous, 2 times per day  sodium chloride flush 0.9 % injection 5-40 mL, 5-40 mL, IntraVENous, PRN  0.9 % sodium chloride infusion, 25 mL, IntraVENous, PRN  acetaminophen (TYLENOL) tablet 650 mg, 650 mg, Oral, Q4H PRN  ondansetron (ZOFRAN-ODT) disintegrating tablet 4 mg, 4 mg, Oral, Q8H PRN **OR** ondansetron (ZOFRAN) injection 4 mg, 4 mg, IntraVENous, Q6H PRN  enoxaparin (LOVENOX) injection 30 mg, 30 mg, SubCUTAneous, BID  atorvastatin (LIPITOR) tablet 40 mg, 40 mg, Oral, Nightly  aspirin chewable tablet 81 mg, 81 mg, Oral, Daily  potassium bicarb-citric acid (EFFER-K) effervescent tablet 20 mEq, 20 mEq, Oral, BID  nitroGLYCERIN (NITROSTAT) SL tablet 0.4 mg, 0.4 mg, SubLINGual, Q5 Min PRN    Allergies:  Patient has no known allergies. Social History:   reports that he has never smoked. He has never used smokeless tobacco. He reports that he does not drink alcohol and does not use drugs. Family History: family history includes High Blood Pressure in his mother and sister; Lupus in his sister; No Known Problems in his brother, brother, brother, brother, father, maternal grandfather, maternal grandmother, paternal grandfather, paternal grandmother, sister, sister, and sister. REVIEW OF SYSTEMS:    Constitutional: there has been no unanticipated weight loss. There's been No change in energy level, No change in activity level. Eyes: No visual changes or diplopia. No scleral icterus. ENT: No Headaches  Cardiovascular: as above   Respiratory: No previous pulmonary problems, No cough  Gastrointestinal: No abdominal pain. No change in bowel or bladder habits. Genitourinary: No dysuria, trouble voiding, or hematuria. Musculoskeletal:  No gait disturbance, No weakness or joint complaints. Integumentary: No rash or pruritis. Neurological: No headache, diplopia, change in muscle strength, numbness or tingling. No change in gait, balance, coordination, mood, affect, memory, mentation, behavior. Psychiatric: No anxiety, or depression. Endocrine: No temperature intolerance. No excessive thirst, fluid intake, or urination. No tremor. Hematologic/Lymphatic: No abnormal bruising or bleeding, blood clots or swollen lymph nodes. Allergic/Immunologic: No nasal congestion or hives. PHYSICAL EXAM:      /70   Pulse 83   Temp 97.9 °F (36.6 °C)   Resp 20   Ht 5' 8\" (1.727 m)   Wt 240 lb (108.9 kg)   SpO2 98%   BMI 36.49 kg/m²    Constitutional and General Appearance: alert, cooperative, no distress and appears stated age  HEENT: PERRL, no cervical lymphadenopathy. No masses palpable.  Normal oral mucosa  Respiratory:  Normal excursion and expansion without use of accessory muscles  Resp Auscultation: Good respiratory effort. No for increased work of breathing. On auscultation: clear to auscultation bilaterally  Cardiovascular: The apical impulse is not displaced  Heart tones are crisp and normal. regular S1 and S2.  Jugular venous pulsation Normal  The carotid upstroke is normal in amplitude and contour without delay or bruit  Peripheral pulses are symmetrical and full   Abdomen:   No masses or tenderness  Bowel sounds present  Extremities:   No Cyanosis or Clubbing   Lower extremity edema: No   Skin: Warm and dry  Neurological:  Alert and oriented. Moves all extremities well  No abnormalities of mood, affect, memory, mentation, or behavior are noted    Labs:     CBC:   Recent Labs     03/26/22 2118   WBC 10.9   HGB 14.1   HCT 40.9        BMP:   Recent Labs     03/26/22 2118 03/28/22  0322   * 136   K 3.2* 4.0   CO2 21 25   BUN 16 13   CREATININE 1.17 0.96   LABGLOM >60 >60   GLUCOSE 145* 109*     BNP: No results for input(s): BNP in the last 72 hours. PT/INR: No results for input(s): PROTIME, INR in the last 72 hours. APTT:No results for input(s): APTT in the last 72 hours. CARDIAC ENZYMES:No results for input(s): CKTOTAL, CKMB, CKMBINDEX, TROPONINI in the last 72 hours. FASTING LIPID PANEL:  Lab Results   Component Value Date    HDL 53 03/27/2022    TRIG 220 03/27/2022     LIVER PROFILE:  Recent Labs     03/26/22 2118   AST 38   ALT 48*   LABALBU 4.4       DATA:    Diagnostics:    EKG: normal sinus rhythm. ECHO: not obtained. Stress Test: reviewed. Cardiac Angiography: not obtained. IMPRESSION:    - atypical chest pain with L vision loss. No past echo. Last stress test was neg  - HTN currently on prinzide.  SBP elevated at 160s during admission but is now under control   RECOMMENDATIONS:  F/u stress test  Continue entresto home med      Discussed with patient and Nurse.    Electronically signed by Khloe Colvin MD on 3/28/2022 at 1700 [facility]      [phone]

## 2022-05-11 ENCOUNTER — APPOINTMENT (OUTPATIENT)
Dept: GENERAL RADIOLOGY | Age: 62
End: 2022-05-11
Payer: MEDICAID

## 2022-05-11 ENCOUNTER — HOSPITAL ENCOUNTER (EMERGENCY)
Age: 62
Discharge: HOME | End: 2022-05-11
Attending: EMERGENCY MEDICINE
Payer: MEDICAID

## 2022-05-11 VITALS
TEMPERATURE: 98.1 F | RESPIRATION RATE: 20 BRPM | SYSTOLIC BLOOD PRESSURE: 148 MMHG | OXYGEN SATURATION: 96 % | DIASTOLIC BLOOD PRESSURE: 82 MMHG | HEART RATE: 108 BPM

## 2022-05-11 DIAGNOSIS — M25.522 LEFT ELBOW PAIN: Primary | ICD-10-CM

## 2022-05-11 DIAGNOSIS — M25.512 ACUTE PAIN OF LEFT SHOULDER: ICD-10-CM

## 2022-05-11 PROCEDURE — 73080 X-RAY EXAM OF ELBOW: CPT

## 2022-05-11 PROCEDURE — 99283 EMERGENCY DEPT VISIT LOW MDM: CPT

## 2022-05-11 PROCEDURE — 73030 X-RAY EXAM OF SHOULDER: CPT

## 2022-05-11 PROCEDURE — 6370000000 HC RX 637 (ALT 250 FOR IP): Performed by: STUDENT IN AN ORGANIZED HEALTH CARE EDUCATION/TRAINING PROGRAM

## 2022-05-11 RX ORDER — CYCLOBENZAPRINE HCL 5 MG
5 TABLET ORAL 2 TIMES DAILY PRN
Qty: 10 TABLET | Refills: 0 | Status: SHIPPED | OUTPATIENT
Start: 2022-05-11 | End: 2022-05-21

## 2022-05-11 RX ORDER — HYDROCODONE BITARTRATE AND ACETAMINOPHEN 5; 325 MG/1; MG/1
1 TABLET ORAL EVERY 6 HOURS PRN
Qty: 8 TABLET | Refills: 0 | Status: SHIPPED | OUTPATIENT
Start: 2022-05-11 | End: 2022-05-14

## 2022-05-11 RX ORDER — OXYCODONE HYDROCHLORIDE 5 MG/1
5 TABLET ORAL ONCE
Status: COMPLETED | OUTPATIENT
Start: 2022-05-11 | End: 2022-05-11

## 2022-05-11 RX ORDER — ACETAMINOPHEN 500 MG
1000 TABLET ORAL ONCE
Status: COMPLETED | OUTPATIENT
Start: 2022-05-11 | End: 2022-05-11

## 2022-05-11 RX ADMIN — ACETAMINOPHEN 1000 MG: 500 TABLET ORAL at 16:07

## 2022-05-11 RX ADMIN — OXYCODONE 5 MG: 5 TABLET ORAL at 16:07

## 2022-05-11 ASSESSMENT — PAIN DESCRIPTION - ORIENTATION: ORIENTATION: LEFT

## 2022-05-11 ASSESSMENT — ENCOUNTER SYMPTOMS
ABDOMINAL PAIN: 0
SHORTNESS OF BREATH: 0
COUGH: 0
VOMITING: 0
DIARRHEA: 0
CONSTIPATION: 0
NAUSEA: 0

## 2022-05-11 ASSESSMENT — PAIN SCALES - GENERAL: PAINLEVEL_OUTOF10: 8

## 2022-05-11 ASSESSMENT — PAIN DESCRIPTION - LOCATION: LOCATION: ELBOW

## 2022-05-11 ASSESSMENT — PAIN - FUNCTIONAL ASSESSMENT: PAIN_FUNCTIONAL_ASSESSMENT: 0-10

## 2022-05-11 NOTE — ED PROVIDER NOTES
[facility] ED     Emergency Department     Faculty Attestation        I performed a history and physical examination of the patient and discussed management with the resident. I reviewed the residents note and agree with the documented findings and plan of care. Any areas of disagreement are noted on the chart. I was personally present for the key portions of any procedures. I have documented in the chart those procedures where I was not present during the key portions. I have reviewed the emergency nurses triage note. I agree with the chief complaint, past medical history, past surgical history, allergies, medications, social and family history as documented unless otherwise noted below. For mid-level providers such as nurse practitioners as well as physicians assistants:    I have personally seen and evaluated the patient. I find the patient's history and physical exam are consistent with NP/PA documentation. I agree with the care provided, treatment rendered, disposition, & follow-up plan. Additional findings are as noted.     Vital Signs: BP (!) 148/82   Pulse 108   Temp 98.1 °F (36.7 °C) (Oral)   Resp 17   SpO2 96%   PCP:  CARMELA Johnson    Pertinent Comments:           Critical Care  None          Naina Ferro MD    Attending Emergency Medicine Physician              Drea Rowan MD  05/11/22 [id number]

## 2022-05-11 NOTE — ED PROVIDER NOTES
[facility] ED  Emergency Department Encounter  EmergencyMedicine Resident     Pt Name:Ashly Castillo  MRN: [id number]  [place] 1960  Date of evaluation: 5/11/22  PCP:  CARMELA Liu    CHIEF COMPLAINT       Chief Complaint   Patient presents with    Elbow Pain     handcuffed by [employer]       HISTORY OF PRESENT ILLNESS  (Location/Symptom, Timing/Onset, Context/Setting, Quality, Duration, Modifying Factors, Severity.)    This patient was evaluated in the Emergency Department for symptoms described in the history of present illness. He/she was evaluated in the context of the global COVID-19 pandemic, which necessitated consideration that the patient might be at risk for infection with the SARS-CoV-2 virus that causes COVID-19. Institutional protocols and algorithms that pertain to the evaluation of patients at risk for COVID-19 are in a state of rapid change based on information released by regulatory bodies including the CDC and federal and state organizations. These policies and algorithms were followed during the patient's care in the ED. Taqueria Horner is a 63 y.o. male who presents to the ED today with complaints of left shoulder and left elbow pain. Patient has chronic left shoulder pain had recent MRI with plans to follow-up with orthopedic team tomorrow to review imaging. Today he was pulled over by the police. When asked to get weapons he reached down to his right side to see if he still had his  on him so that he can inform the police. Police took this as a sign that he was reaching for his weapon and removed him from the vehicle and forcefully handcuffed him injuring his left arm. He denies falling or being taken to the ground. He reports severe pain from left shoulder and left elbow with limited range of motion secondary to pain. Pain is currently severe, constant, worse with movement. Mild associated swelling. Denies any numbness or tingling.     PAST MEDICAL / SURGICAL / SOCIAL / FAMILY HISTORY      has a past medical history of Cancer ([facility] [address] Hematuria, Hypertension, Intermittent abdominal pain, Nerve injury, DILAN (obstructive sleep apnea), and Prediabetes. has a past surgical history that includes Appendectomy; Cystoscopy (N/A, 02/07/2019); lymph node dissection (Right, 2004); Septoplasty; Prostate biopsy (N/A, 03/21/2019); Prostatectomy (N/A, 05/31/2019); Cystoscopy (N/A, 05/28/2020); Circumcision (N/A, 07/10/2020); Colonoscopy (N/A, 02/01/2021); hernia repair (06/25/2021); and hernia repair (N/A, 6/25/2021). Social History     Socioeconomic History    Marital status: Single     Spouse name: Not on file    Number of children: Not on file    Years of education: Not on file    Highest education level: Not on file   Occupational History    Not on file   Tobacco Use    Smoking status: Never Smoker    Smokeless tobacco: Never Used   Vaping Use    Vaping Use: Never used   Substance and Sexual Activity    Alcohol use: No    Drug use: No    Sexual activity: Yes     Partners: Female     Birth control/protection: Condom   Other Topics Concern    Not on file   Social History Narrative    Not on file     Social Determinants of Health     Financial Resource Strain:     Difficulty of Paying Living Expenses: Not on file   Food Insecurity:     Worried About Running Out of Food in the Last Year: Not on file    Priya of Food in the Last Year: Not on file   Transportation Needs:     Lack of Transportation (Medical): Not on file    Lack of Transportation (Non-Medical):  Not on file   Physical Activity:     Days of Exercise per Week: Not on file    Minutes of Exercise per Session: Not on file   Stress:     Feeling of Stress : Not on file   Social Connections:     Frequency of Communication with Friends and Family: Not on file    Frequency of Social Gatherings with Friends and Family: Not on file    Attends Judaism Services: Not on file   Tracy French Member of Clubs or Organizations: Not on file    Attends Club or Organization Meetings: Not on file    Marital Status: Not on file   Intimate Partner Violence:     Fear of Current or Ex-Partner: Not on file    Emotionally Abused: Not on file    Physically Abused: Not on file    Sexually Abused: Not on file   Housing Stability:     Unable to Pay for Housing in the Last Year: Not on file    Number of Places Lived in the Last Year: Not on file    Unstable Housing in the Last Year: Not on file       Family History   Problem Relation Age of Onset    High Blood Pressure Mother     No Known Problems Father     Lupus Sister     High Blood Pressure Sister     No Known Problems Brother     No Known Problems Maternal Grandmother     No Known Problems Maternal Grandfather     No Known Problems Paternal Grandmother     No Known Problems Paternal Grandfather     No Known Problems Sister     No Known Problems Sister     No Known Problems Sister     No Known Problems Brother     No Known Problems Brother     No Known Problems Brother        Allergies:  Patient has no known allergies. Home Medications:  Prior to Admission medications    Medication Sig Start Date End Date Taking? Authorizing Provider   HYDROcodone-acetaminophen (NORCO) 5-325 MG per tablet Take 1 tablet by mouth every 6 hours as needed for Pain for up to 8 doses. Intended supply: 3 days. Take lowest dose possible to manage pain 5/11/22 5/14/22 Yes Oscar Navas DO   cyclobenzaprine (FLEXERIL) 5 MG tablet Take 1 tablet by mouth 2 times daily as needed for Muscle spasms 5/11/22 5/21/22 Yes Oscar Navas DO   atorvastatin (LIPITOR) 40 MG tablet Take 1 tablet by mouth nightly 3/28/22   Ney Clement MD   aspirin 81 MG chewable tablet Take 1 tablet by mouth daily 3/29/22   Ney Clement MD   nitroGLYCERIN (NITROSTAT) 0.4 MG SL tablet up to max of 3 total doses.  If no relief after 1 dose, call 911. 3/28/22   Dorinda Kim Layton Beavers MD   docusate sodium (COLACE) 100 MG capsule Take 1 capsule by mouth daily as needed for Constipation  Patient not taking: Reported on 3/27/2022 6/25/21   Rhonda Vidal DO   vitamin D (ERGOCALCIFEROL) 1.25 MG (85564 UT) CAPS capsule TK ONE CAPSULE PO ONCE A WEEK 11/22/20   Historical Provider, MD   Multiple Vitamins-Minerals (THERAPEUTIC MULTIVITAMIN-MINERALS) tablet Take 1 tablet by mouth daily    Historical Provider, MD   lisinopril-hydroCHLOROthiazide (PRINZIDE;ZESTORETIC) 20-12.5 MG per tablet Take 1 tablet by mouth daily    Historical Provider, MD       REVIEW OF SYSTEMS    (2-9 systems for level 4, 10 or more for level 5)      Review of Systems   Constitutional: Negative for chills, diaphoresis and fever. Respiratory: Negative for cough and shortness of breath. Cardiovascular: Negative for chest pain. Gastrointestinal: Negative for abdominal pain, constipation, diarrhea, nausea and vomiting. Musculoskeletal: Negative for myalgias and neck pain. Left shoulder and elbow pain   Neurological: Negative for dizziness, weakness, numbness and headaches. PHYSICAL EXAM   (up to 7 for level 4, 8 or more for level 5)      INITIAL VITALS:   BP (!) 148/82   Pulse 108   Temp 98.1 °F (36.7 °C) (Oral)   Resp 20   SpO2 96%     Physical Exam  Constitutional:       Appearance: He is well-developed. Comments: Sitting in bed no acute distress. Appears in significant discomfort secondary to left arm and shoulder pain. HENT:      Head: Normocephalic and atraumatic. Eyes:      Conjunctiva/sclera: Conjunctivae normal.   Neck:      Trachea: No tracheal deviation. Cardiovascular:      Rate and Rhythm: Normal rate and regular rhythm. Heart sounds: Normal heart sounds. Pulmonary:      Effort: Pulmonary effort is normal. No respiratory distress. Breath sounds: Normal breath sounds. No wheezing or rales. Abdominal:      General: Bowel sounds are normal. There is no distension. Palpations: Abdomen is soft. Tenderness: There is no abdominal tenderness. There is no guarding or rebound. Musculoskeletal:      Cervical back: Normal range of motion. Comments: Diffuse tenderness left shoulder most notably in anterior aspect and down biceps region to the elbow. Also has left lateral elbow pain with limited range of motion secondary to pain. There is 2+ radial pulse. Sensation grossly intact. Weakness secondary to pain. Skin:     General: Skin is warm and dry. Neurological:      Mental Status: He is alert and oriented to person, place, and time. DIFFERENTIAL  DIAGNOSIS     PLAN (LABS / IMAGING / EKG):  Orders Placed This Encounter   Procedures    XR ELBOW LEFT (MIN 3 VIEWS)    XR SHOULDER LEFT (MIN 2 VIEWS)    [facility] [employer] Sling and Swathe, Left       MEDICATIONS ORDERED:  Orders Placed This Encounter   Medications    acetaminophen (TYLENOL) tablet 1,000 mg    oxyCODONE (ROXICODONE) immediate release tablet 5 mg    HYDROcodone-acetaminophen (NORCO) 5-325 MG per tablet     Sig: Take 1 tablet by mouth every 6 hours as needed for Pain for up to 8 doses. Intended supply: 3 days. Take lowest dose possible to manage pain     Dispense:  8 tablet     Refill:  0    cyclobenzaprine (FLEXERIL) 5 MG tablet     Sig: Take 1 tablet by mouth 2 times daily as needed for Muscle spasms     Dispense:  10 tablet     Refill:  0         DIAGNOSTIC RESULTS / EMERGENCY DEPARTMENT COURSE / MDM     No results found for this visit on 05/11/22. RADIOLOGY:  XR ELBOW LEFT (MIN 3 VIEWS)   Final Result   No acute abnormality. XR SHOULDER LEFT (MIN 2 VIEWS)   Final Result   No acute abnormality. IMPRESSION/MDM/EMERGENCY DEPARTMENT COURSE:  Patient came to emergency department, HPI and physical exam were conducted. All nursing notes were reviewed.       80-year-old male presenting to the emergency department with acute left shoulder and arm pain.    Concern for severe strain/sprain. Will obtain imaging to rule out underlying fracture or dislocation. Already has follow-up with Ortho tomorrow for MRI review. Will likely need aggressive analgesics due to severity of condition as well as sling upon discharge. No C or T-spine tenderness. Patient is neurovascular intact. Imaging negative for fracture. Pain improved. Will DC with pain meds, muscle relaxer, sling. Has follow up with ortho tomorrow. Strict return precautions. FINAL IMPRESSION      1. Left elbow pain    2. Acute pain of left shoulder          DISPOSITION / PLAN     DISPOSITION Decision To Discharge 05/11/2022 04:36:30 PM      PATIENT REFERRED TO:  CARMELA Ontiveros [address]  [phone]    Schedule an appointment as soon as possible for a visit       [facility]  [address] [place] [address]  [phone]    As needed, If symptoms worsen    [address] [facility]  [phone]  Schedule an appointment as soon as possible for a visit         DISCHARGE MEDICATIONS:  Discharge Medication List as of 5/11/2022  4:43 PM      START taking these medications    Details   HYDROcodone-acetaminophen (NORCO) 5-325 MG per tablet Take 1 tablet by mouth every 6 hours as needed for Pain for up to 8 doses. Intended supply: 3 days.  Take lowest dose possible to manage pain, Disp-8 tablet, R-0Print      cyclobenzaprine (FLEXERIL) 5 MG tablet Take 1 tablet by mouth 2 times daily as needed for Muscle spasms, Disp-10 tablet, R-0Print             Woody Magana DO  Emergency Medicine Resident    (Please note that portions of thisnote were completed with a voice recognition program.  Efforts were made to edit the dictations but occasionally words are mis-transcribed.)         Woody Magana DO  Resident  05/12/22 [id number]

## 2022-05-11 NOTE — ED NOTES
Patient presented to the ED today with complaints of left elbow pain. Patient states he was injured today while being pulled over by the police.       Darryle Caller, LPN  98/03/87 [phone]

## 2022-08-01 ENCOUNTER — HOSPITAL ENCOUNTER (OUTPATIENT)
Age: 62
Discharge: HOME | End: 2022-08-01
Payer: MEDICAID

## 2022-08-01 LAB
ABSOLUTE EOS #: 0.22 K/UL (ref 0–0.44)
ABSOLUTE IMMATURE GRANULOCYTE: 0.03 K/UL (ref 0–0.3)
ABSOLUTE LYMPH #: 2.11 K/UL (ref 1.1–3.7)
ABSOLUTE MONO #: 0.52 K/UL (ref 0.1–1.2)
ALBUMIN SERPL-MCNC: 4.5 G/DL (ref 3.5–5.2)
ALBUMIN/GLOBULIN RATIO: 1.5 (ref 1–2.5)
ALP BLD-CCNC: 79 U/L (ref 40–129)
ALT SERPL-CCNC: 54 U/L (ref 5–41)
ANION GAP SERPL CALCULATED.3IONS-SCNC: 12 MMOL/L (ref 9–17)
AST SERPL-CCNC: 32 U/L
BASOPHILS # BLD: 1 % (ref 0–2)
BASOPHILS ABSOLUTE: 0.04 K/UL (ref 0–0.2)
BILIRUB SERPL-MCNC: 0.61 MG/DL (ref 0.3–1.2)
BUN BLDV-MCNC: 18 MG/DL (ref 6–20)
CALCIUM SERPL-MCNC: 9.1 MG/DL (ref 8.6–10.4)
CHLORIDE BLD-SCNC: 104 MMOL/L (ref 98–107)
CHOLESTEROL, FASTING: 212 MG/DL
CHOLESTEROL/HDL RATIO: 4.4
CO2: 23 MMOL/L (ref 20–31)
CREAT SERPL-MCNC: 1.03 MG/DL (ref 0.7–1.2)
EOSINOPHILS RELATIVE PERCENT: 3 % (ref 1–4)
ESTIMATED AVERAGE GLUCOSE: 166 MG/DL
GFR AFRICAN AMERICAN: >60 ML/MIN
GFR NON-AFRICAN AMERICAN: >60 ML/MIN
GFR SERPL CREATININE-BSD FRML MDRD: ABNORMAL ML/MIN/{1.73_M2}
GLUCOSE BLD-MCNC: 179 MG/DL (ref 70–99)
HBA1C MFR BLD: 7.4 % (ref 4–6)
HCT VFR BLD CALC: 43 % (ref 40.7–50.3)
HDLC SERPL-MCNC: 48 MG/DL
HEMOGLOBIN: 14.3 G/DL (ref 13–17)
IMMATURE GRANULOCYTES: 1 %
LDL CHOLESTEROL: 116 MG/DL (ref 0–130)
LYMPHOCYTES # BLD: 32 % (ref 24–43)
MCH RBC QN AUTO: 29 PG (ref 25.2–33.5)
MCHC RBC AUTO-ENTMCNC: 33.3 G/DL (ref 28.4–34.8)
MCV RBC AUTO: 87.2 FL (ref 82.6–102.9)
MONOCYTES # BLD: 8 % (ref 3–12)
NRBC AUTOMATED: 0 PER 100 WBC
PDW BLD-RTO: 12.8 % (ref 11.8–14.4)
PLATELET # BLD: 197 K/UL (ref 138–453)
PMV BLD AUTO: 11.3 FL (ref 8.1–13.5)
POTASSIUM SERPL-SCNC: 4 MMOL/L (ref 3.7–5.3)
RBC # BLD: 4.93 M/UL (ref 4.21–5.77)
SEG NEUTROPHILS: 55 % (ref 36–65)
SEGMENTED NEUTROPHILS ABSOLUTE COUNT: 3.68 K/UL (ref 1.5–8.1)
SODIUM BLD-SCNC: 139 MMOL/L (ref 135–144)
TOTAL PROTEIN: 7.5 G/DL (ref 6.4–8.3)
TRIGLYCERIDE, FASTING: 242 MG/DL
TSH SERPL DL<=0.05 MIU/L-ACNC: 3.64 UIU/ML (ref 0.3–5)
VITAMIN B-12: 489 PG/ML (ref 232–1245)
VITAMIN D 25-HYDROXY: 28.6 NG/ML
WBC # BLD: 6.6 K/UL (ref 3.5–11.3)

## 2022-08-01 PROCEDURE — 84443 ASSAY THYROID STIM HORMONE: CPT

## 2022-08-01 PROCEDURE — 85025 COMPLETE CBC W/AUTO DIFF WBC: CPT

## 2022-08-01 PROCEDURE — 82607 VITAMIN B-12: CPT

## 2022-08-01 PROCEDURE — 82306 VITAMIN D 25 HYDROXY: CPT

## 2022-08-01 PROCEDURE — 80053 COMPREHEN METABOLIC PANEL: CPT

## 2022-08-01 PROCEDURE — 36415 COLL VENOUS BLD VENIPUNCTURE: CPT

## 2022-08-01 PROCEDURE — 80061 LIPID PANEL: CPT

## 2022-08-01 PROCEDURE — 83036 HEMOGLOBIN GLYCOSYLATED A1C: CPT

## 2022-08-10 ENCOUNTER — OFFICE VISIT (OUTPATIENT)
Dept: NEUROLOGY | Age: 62
End: 2022-08-10
Payer: MEDICAID

## 2022-08-10 VITALS
DIASTOLIC BLOOD PRESSURE: 81 MMHG | HEART RATE: 92 BPM | SYSTOLIC BLOOD PRESSURE: 147 MMHG | BODY MASS INDEX: 37.35 KG/M2 | WEIGHT: 246.4 LBS | HEIGHT: 68 IN

## 2022-08-10 DIAGNOSIS — G57.32 PERONEAL MONONEUROPATHY, LEFT: ICD-10-CM

## 2022-08-10 DIAGNOSIS — H53.9 VISION CHANGES: Primary | ICD-10-CM

## 2022-08-10 PROCEDURE — 3017F COLORECTAL CA SCREEN DOC REV: CPT | Performed by: PSYCHIATRY & NEUROLOGY

## 2022-08-10 PROCEDURE — 99214 OFFICE O/P EST MOD 30 MIN: CPT | Performed by: PSYCHIATRY & NEUROLOGY

## 2022-08-10 PROCEDURE — G8427 DOCREV CUR MEDS BY ELIG CLIN: HCPCS | Performed by: PSYCHIATRY & NEUROLOGY

## 2022-08-10 PROCEDURE — G8417 CALC BMI ABV UP PARAM F/U: HCPCS | Performed by: PSYCHIATRY & NEUROLOGY

## 2022-08-10 PROCEDURE — 1036F TOBACCO NON-USER: CPT | Performed by: PSYCHIATRY & NEUROLOGY

## 2022-08-10 RX ORDER — MELOXICAM 7.5 MG/1
7.5 TABLET ORAL DAILY
COMMUNITY
Start: 2022-07-18

## 2022-08-10 RX ORDER — MECLIZINE HYDROCHLORIDE CHEWABLE TABLETS 25 MG/1
TABLET, CHEWABLE ORAL
COMMUNITY
Start: 2022-08-01

## 2022-08-10 RX ORDER — ECHINACEA PURPUREA EXTRACT 125 MG
2 TABLET ORAL 4 TIMES DAILY PRN
COMMUNITY
Start: 2022-05-17

## 2022-08-10 RX ORDER — MAGNESIUM 200 MG
200 TABLET ORAL DAILY
Qty: 90 TABLET | Refills: 2 | Status: SHIPPED | OUTPATIENT
Start: 2022-08-10 | End: 2022-11-08

## 2022-08-10 NOTE — PROGRESS NOTES
[facility]  [facility] [address]  [address], [address] [facility]  Ph: [phone] or [phone]  FAX: [phone]    Chief Complaint: Vision loss, knee pain     Dear CARMELA Meraz     I had the pleasure of seeing your patient today in neurology consultation for his symptoms. As you would recall Yair Menjivar is a 63 y.o. male. He was admitted on 3/26/2022 with concern of chest pain and transient vision loss. Patient is borderline diabetic and has history of hypertension and sleep apnea. As per note, he was driving when he had sudden onset of blurry vision in his left eye which lasted for 15 to 20 minutes before he developed diaphoresis and chest pain accompanied by nausea and vomiting. The vomiting continued and patient pulled over and called his daughter to take him to the hospital. The patient has had intermittent episodes of blurred vision for 5 years that was occurring 1-2 times per week. No family history of strokes or seizure or MS. patient is not a smoker and does not utilize alcohol. The patient is presenting today as a follow-up. Since his previous ED visit, he has been experiencing loss of vision. His most recent episode was 2 weeks ago and it occurred for a few hours. It happened in his left eye and feels like a shutter. Patient denies diplopia. Patient gets a headache once a year. Patient also inquired about knee pain. Starts on his left knee and it radiates down. Describes it as a constant pain. Stretches to relieve the pain. Pain occurs every 3-4 months.   Past Medical History:   Diagnosis Date    Cancer ([address] 2019    prostate    Hematuria     Hypertension     Intermittent abdominal pain     Nerve injury     left leg , states  s/p prostatectomy    DILAN (obstructive sleep apnea)     DOES NOT HAVE MACHINE    Prediabetes      Past Surgical History:   Procedure Laterality Date    APPENDECTOMY      CIRCUMCISION N/A 07/10/2020    CIRCUMCISION performed by Rima Almonte MD at [facility] N/A 02/01/2021    COLONOSCOPY POLYPECTOMY HOT BIOPSY performed by Caitlin Milner MD at [facility] N/A 02/07/2019    CYSTOSCOPY performed by Rima Almonte MD at [address] N/A 05/28/2020    CYSTOSCOPY performed by Rima Almonte MD at [facility]  06/25/2021    XI [address]IC [address] N/A 06/25/2021    XI ROBOTIC [place] performed by Maral Diaz IV, DO at [place] [address] Right 2004    NECK    PROSTATE BIOPSY N/A 03/21/2019    PROSTATE BIOPSY, ULTRASOUND  (OFFICE TO CONTACT [place] performed by Rima Almonte MD at [place] [address] N/A 05/31/2019    XI LAPAROSCOPIC ROBOTIC RADICAL PROSTATECTOMY, PELVIC LYMPH NODE DISSECTION performed by Rima Almonte MD at [address]  05/2022     No Known Allergies  Family History   Problem Relation Age of Onset    Other Mother         Kidney issues    High Blood Pressure Mother     No Known Problems Father     Lupus Sister     High Blood Pressure Sister     No Known Problems Sister     No Known Problems Sister     No Known Problems Sister     No Known Problems Brother     No Known Problems Brother     No Known Problems Brother     No Known Problems Brother     No Known Problems Maternal Grandmother     No Known Problems Maternal Grandfather     No Known Problems Paternal Grandmother     No Known Problems Paternal Grandfather       Social History     Socioeconomic History    Marital status: Single     Spouse name: Not on file    Number of children: Not on file    Years of education: Not on file    Highest education level: Not on file   Occupational History    Not on file   Tobacco Use    Smoking status: Never    Smokeless tobacco: Never   Vaping Use    Vaping Use: Never used   Substance and Sexual Activity    Alcohol use: No    Drug use: No    Sexual activity: Yes Partners: Female     Birth control/protection: Condom   Other Topics Concern    Not on file   Social History Narrative    Not on file     Social Determinants of Health     Financial Resource Strain: Not on file   Food Insecurity: Not on file   Transportation Needs: Not on file   Physical Activity: Not on file   Stress: Not on file   Social Connections: Not on file   Intimate Partner Violence: Not on file   Housing Stability: Not on file      BP (!) 147/81 (Site: Right Upper Arm, Position: Sitting, Cuff Size: Large Adult)   Pulse 92   Ht 5' 8\" (1.727 m)   Wt 246 lb 6.4 oz (111.8 kg)   BMI 37.46 kg/m²       HEENT [] Hearing Loss  [x] Visual Disturbance  [] Tinnitus  [] Eye pain   Respiratory [] Shortness of Breath  [] Cough  [] Snoring   Cardiovascular [] Chest Pain  [] Palpitations  [] Lightheaded   GI [] Constipation  [] Diarrhea  [] Swallowing change  [] Nausea/vomiting    [] Urinary Frequency  [] Urinary Urgency   Musculoskeletal [] Neck pain  [] Back pain  [] Muscle pain  [] Restless legs   Dermatologic [] Skin changes   Neurologic [] Memory loss/confusion  [] Seizures  [] Trouble walking or imbalance  [] Dizziness  [] Sleep disturbance  [] Weakness  [] Numbness  [] Tremors  [] Speech Difficulty  [] Headaches  [] Light Sensitivity  [] Sound Sensitivity   Endocrinology []Excessive thirst  []Excessive hunger   Psychiatric [] Anxiety/Depression  [] Hallucination   Allergy/immunology []Hives/environmental allergies   Hematologic/lymph [] Abnormal bleeding  [] Abnormal bruising         General appearence: Normal. Well groomed.  In no acute distress    Head: Normocephalic, atraumatic  Eyes: Extraocular movements intact, eye lids normal  Lungs: Respirations unlabored, chest wall no deformity  ENT: Normal external ear canals, no sinus tenderness  Heart: Regular rate rhythm  Abdomen: No masses, tenderness  Extremities: No cyanosis or edema, 2+ pulses  Musculoskeletal: Normal range of motion in all joints  Skin: Intact, normal skin color    Neurological examination:    Mental status   Alert and oriented; intact memory with no confusion, speech or language problems; no hallucinations or delusions     Cranial nerves   II - visual fields intact to confrontation                                                III, IV, VI - extra-ocular muscles full: no pupillary defect; no KANDI, no nystagmus, no ptosis   V - normal facial sensation                                                               VII - normal facial symmetry                                                             VIII - intact hearing                                                                             IX, X - symmetrical palate                                                                  XI - symmetrical shoulder shrug                                                       XII - midline tongue without atrophy or fasciculation     Motor function  Normal muscle bulk and tone; normal power 5/5, including fine motor movements     Sensory function Intact to touch, pin, vibration, proprioception     Cerebellar Intact fine motor movement. No involuntary movements or tremors     Reflex function Intact 2+ DTR and symmetric. Negative Babinski     Gait                  Normal station and gait       Lab Results   Component Value Date    LDLCHOLESTEROL 116 08/01/2022     No components found for: CHLPL  Lab Results   Component Value Date    TRIG 220 (H) 03/27/2022     Lab Results   Component Value Date    HDL 48 08/01/2022    HDL 53 03/27/2022    HDL 54 09/09/2021     No results found for: LDLCALC  No results found for: LABVLDL  Lab Results   Component Value Date    LABA1C 7.4 (H) 08/01/2022     Lab Results   Component Value Date     08/01/2022     Lab Results   Component Value Date    ULFVKVIY63 930 08/01/2022      Neurological work up:  CT head  CTA head and neck  MRI brain   2 D echo     All of patient's labs were personally reviewed.  All the imaging studies were personally reviewed and discussed with the patient. Assessment Recommendations:    Patient with transient left visual obscuration:  Etiology for the symptoms unclear. Patient's MRI scan of the brain, CT angiogram head and neck has been negative. I suspect underlying ophthalmological etiology. ESR and CRP is negative. He has been seen by ophthalmologist and no clear etiology has been found. I will refer the patient to neuro-ophthalmology. Diagnosis of exclusion remains ocular migraine for which I would recommend to start magnesium oxide 200 mg p.o. daily    Possible left common peroneal neuropathy at fibular head:  Patient reports having intermittent pain radiating in the superficial peroneal sensory nerve distribution and has positive Tinel's sign on examination. Have advised the patient to avoid compression at fibular head and have scheduled the patient for EMG nerve conduction study. We will also refer the patient to physical therapy    Patient to follow up in 2-3 months or sooner if symptoms worsen. CARMELA Velazquez I would like to thank you for the consult. Please do not hesitate if you have any questions about the patient care. Scribe Attestation:   By signing my name below, Hillarylenny Hay, attest that this documentation has been prepared under the direction and in the presence of Herve Lemon MD.    Electronically Signed: Violeta De La Rosa 08/10/22. 11:21 AM    I, Isabella Lang MD, personally performed the services described in this documentation. All medical record entries made by the scribe were at my direction and in my presence. I have reviewed the chart and discharge instructions (if applicable) and agree that the record reflects my personal performance and is accurate and complete.     Electronically Signed: Isabella Lang 8/11/2022 9:46 PM    Diplomate, American Board of Psychiatry and Neurology  Diplomate, American Board of Clinical Neurophysiology  Diplomate, American Board of Epilepsy             This note is created with the assistance of a speech-recognition program. While intending to generate a document that actually reflects the content of the visit, the document can still have some errors including those of syntax and sound a- like substitutions which may escape proofreading. In such instances, actual meaning can be extrapolated by contextual derivation.

## 2022-08-25 ENCOUNTER — TELEPHONE (OUTPATIENT)
Dept: NEUROLOGY | Age: 62
End: 2022-08-25

## 2022-08-25 ENCOUNTER — HOSPITAL ENCOUNTER (OUTPATIENT)
Dept: PHYSICAL THERAPY | Age: 62
Setting detail: THERAPIES SERIES
Discharge: HOME | End: 2022-08-25
Payer: MEDICAID

## 2022-08-25 PROCEDURE — 97161 PT EVAL LOW COMPLEX 20 MIN: CPT

## 2022-08-25 NOTE — TELEPHONE ENCOUNTER
[facility] from [facility]. [facility] physical therapy called in about the patient, as he was evaluated for treatment. At that time, the therapist felt some of the patient's issues were related to some vestibular disorder. With your permission, the therapist would like to evaluate him for vestibular therapy. If you agree an order would need to be generated to [facility]. [facility] center.

## 2022-08-25 NOTE — CONSULTS
[x] [facility]) - [facility] &  Therapy  [address]    [phone]  F: [phone] [] [address]  [facility]   [address]  P: [phone]  F: [phone] [] [place]  [address] [facility]  P: [phone]  F: [phone] [] [address]  [facility]   [address]   P: [phone]  F: [phone] [] [place] [address] [facility] [address]  [place]: [phone]   F: [phone]        Physical Therapy Neurological Evaluation    Date:  2022  Patient: Shivani Acosta  : 1960  MRN: [id number]  Physician: Dr. 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 Onset occurred after prostate removal (d/t CA) in 2019, with progressive course. Pt notes after arousing from anesthesia, he noticed sharp pain in lateral lower leg. Notes it improved initially after massaging/stretching, but then when returning to work it returns. Reports pain from fibular head radiating to dorsal foot - described as tingling, constant, numb. Worsened only with time, per pt; for the most part, is constant pain. Notes increased weightbearing/walking does not affect it. Denies any falls/balance issues. Of note: Over the past 5 years, has experienced \"eye issues\" - episodes described as \"pulling the shutter down care home\", gets dizzy, cold sweats, and feels like he will vomit.  These episodes last ~1-1.5hrs, seem to be getting more intense and lengthy each time. States he has to sit for the entire length of these episodes for them to go away; unable to lay down. Does report \"visual jumping\" when looking at items. Temporal factors: slow progressive worsening      PLOF     Pain-free ADL/IADLs, no limitations   Summary of current limitations:     Limited walking distances d/t feeling numbness while walking on foot, off work d/t condition   Previous physical therapy?    [x]No       []Yes, date of most recent, facility: N/A        PMHx: [] Unremarkable [] Diabetes [] HTN  [] Pacemaker   [] MI/Heart Problems [x] Cancer - prostate, 2019 [] Arthritis [x] Other: Hx shoulder surgery  (rotator cuff repair) May 2022, hernia repair June 2021              [x] Refer to full medical chart  In EPIC     Comorbidities:   [x] Obesity [] Dialysis  [] Other:   [] Asthma/COPD [] Dementia [] Other:   [] Stroke [] Sleep apnea [] Other:   [] Vascular disease [] Rheumatic disease [] Other:     Tests: [] X-Ray: [] MRI:  [x] Other: none    Medications: [x] Refer to full medical record [] None [] Other:  Allergies:       [x] Refer to full medical record [] None [] Other:        Durable Medical Equipment:  Current DME available: cane but doesn't use anymore      ADL/IADL Previous level of function Current level of function Who currently assists the patient with task   Bathing  [x] Independent  [] Assist [x] Independent  [] Assist    Dress/grooming [x] Independent  [] Assist [x] Independent  [] Assist    Transfer/mobility [x] Independent  [] Assist [x] Independent  [] Assist    Feeding [x] Independent  [] Assist [x] Independent  [] Assist    Toileting [x] Independent  [] Assist [x] Independent  [] Assist    Driving [x] Independent  [] Assist [x] Independent  [] Assist    Housekeeping [x] Independent  [] Assist [x] Independent  [] Assist    Grocery shop/meal prep [x] Independent  [] Assist [x] Independent  [] Assist      Gait Prior level of function Current level of function    [x] Independent  [] Assist [x] Independent  [] Assist   Device: [x] Independent [x] Independent    [] Straight Cane [] Quad cane [] Straight Cane [] Quad cane    [] Standard walker [] Rolling walker   [] 4 wheeled walker [] Standard walker [] Rolling walker   [] 4 wheeled walker    [] Wheelchair [] Wheelchair       Pain:  [x] Yes  [] No Location: L lateral lower leg Pain Rating: (0-10 scale) 10/10  Pain altered Tx:  [] Yes  [] No  Action:    Symptoms:  [] Improving [x] Worsening [] Same  Better:  stretching, massaging  Worse: over time; constant pain   Sleep: [] OK    [x] Disturbed           Objective:    POSTURE No deficit Deficit Not Tested Comments   Kyphosis [x] [] []    Scoliosis [x] [] []    Forward Head [] [x] []    Rounded Shldrs [] [x] []    Slumped Sitting [] [x] []    Skin Integrity [] [] [x]           NEUROLOGICAL       Reflexes [] [x] [] [] 0: absent  [x] 1+: diminished - bilat patellar  [] 2+: brisk, normal  [] 3+: brisker than average, slight hyperactive  [] 4+: very brisk, hyperactive, clonus    Special reflexes:   Babinski:   [] negative [] positive  Hoffmans: []negative  [] positive   Cranial Nerves [] [] []    Sensation [] [x] [] Grossly reports tingling in lower LLE through dorsum of foot   Bladder/Bowel [x] [] []    Coordination [] [] [x]                                    Present         Absent                                         UE/LE           UE/LE  Dysdiadochokinesia:       [] []            [] []  Dysmetria:                     [] []            [] []   Tone [x] [] [] [x] 0: no increase in muscle tone  [] 1: slight increase in muscle tone, manifested by a catch and release or by minimal resistance at end ROM  [] 1+: slight increase in muscle tone, manifested by a catch and release or by minimal resistance throughout remainder (less than half) of ROM  [] 2: more marked increase in muscle tone throughout most of ROM, but affected part easily moved  [] 3: considerable increase in tone, passive movement difficult  [] 4:  rigid in flexion or extension   Clonus [x] [] []    Tremors [x] [] []           Gait  [] [x] [] Analysis: no gross deficits, mildly slower pace; slightly ER L tibia, pt reports feeling like the LLE \"clomps down\" - unable to observe any foot slap but pt does report he feels like this is happening compared to RLE   Comments:         ROM  °A/P STRENGTH    Left Right Left Right   Shoulder Flex       Abd       Elbow Flex       Ext       Wrist Flex       Ext       Hand        Hip Flex   5    Ext   5    Abd   5    Knee Flex   5    Ext   5    Ankle DF 5 8 5    PF   5    Inv WNL (feels good stretch)  5    Ev WNL  5-    - Of note, seemingly fatty deposit noted in posterior RIGHT knee, when assessing for symmetry - nothing in left. Educated pt to make PCP aware at next appointment d/t no pain, moveable, and no other concurrent symptoms. Subjective outcome measure used: LEFS  Score: 56% function, 44% impaired     Comments:    Assessment:  Patient would benefit from skilled physical therapy services in order to: address L gastroc tightness/trigger points that appear more laterally in calf and potentially resulting in peroneal compression, resulting in L lower leg pain/tingling, increase gastroc/calf strength on LLE to improve tolerance to prolonged ambulation, heavier lifting/stair climbing, and other higher-level activities including work duties. Problems:    [x] ? Pain:  [x] ? ROM:  [x] ? Strength:  [x] ? Function:  [] Other:       STG: (to be met in 10 treatments)  ? Pain: 0/10 pain with all ADL/IADLs  ? ROM: Gastroc length to at least 15deg on LLE with knee extended to indicate improved mobility and reduced spasm/tightness resulting in pain  ? Strength: Able to complete 25 SL heel raises on LLE with min UE assist and no fatigue, good height throughout  ? Function:   Pt able to walk for unlimited distances without limitation from LLE pain.   Pt able to report at least 75% improvement in sleep quality since initial evaluation d/t less pain  Pt to report at least 70% function per LEFS to indicate subjective improvement in pain levels of LLE  Patient to be independent with home exercise program as demonstrated by performance with correct form without cues. Patient goals: \"no pain\"    Rehab Potential:  [x] Good  [] Fair  [] Poor   Suggested Professional Referral:  [] No  [x] Yes:vestibular eval requested from neurologist d/t associated symptoms with visual disturbances  Barriers to Goal Achievement[de-identified]  [x] No  [] Yes:  Domestic Concerns:  [x] No  [] Yes:    Pt. Education:  [x] Plans/Goals, Risks/Benefits discussed  [] Home exercise program  Method of Education: [x] Verbal  [] Demo  [] Written  Comprehension of Education:  [x] Verbalizes understanding. [] Demonstrates understanding. [] Needs Review. [] Demonstrates/verbalizes understanding of HEP/Ed previously given. Treatment Plan:  [x] Therapeutic Exercise   90314  [] Iontophoresis: 4 mg/mL Dexamethasone Sodium Phosphate  mAmin  36614   [x] Therapeutic Activity  19197 [] Vasopneumatic cold with compression  31131    [x] Gait Training   15714 [] Ultrasound   71757   [x] Neuromuscular Re-education  96225 [] Electrical Stimulation Unattended  40120   [x] Manual Therapy  38893 [] Electrical Stimulation Attended  69817   [x] Instruction in HEP  [] Dry Needling   [] Aquatic Therapy   34847 [x] Cold/hotpack    [] Massage   02431      [] Lumbar/Cervical Traction  52178     []  Medication allergies reviewed for use of    Dexamethasone Sodium Phosphate 4mg/ml     with iontophoresis treatments. Pt is not allergic.       Frequency: 2 x/weeks for 10 visits    Todays Treatment:  Modalities:   Exercises:  Exercise Reps/ Time Weight/ Level Comments                                 Other: HELD per insurance limitations, will provide HEP in upcoming session    Specific Instructions for next treatment:  - gastroc trigger point release all throughout posterior and lateral calf - MFR, or hypervolt  - followed by gastroc stretching - long hold, progressive intensity as able  - gastroc strengthening: SL heel raises, heel taps off step, SL tasks    Evaluation Complexity:  History (Personal factors, comorbidities) [] 0 [] 1-2 [x] 3+   Exam (limitations, restrictions) [] 1-2 [] 3 [x] 4+   Clinical presentation (progression) [x] Stable [] Evolving  [] Unstable   Decision Making [x] Low [] Moderate [] High    [x] Low Complexity [] Moderate Complexity [] High Complexity       Treatment Charges: Mins Units   [x] Evaluation       [x]  Low       []  Moderate       []  High 46 1   []  Modalities     []  Ther Exercise     []  Manual Therapy     []  Ther Activities     []  Aquatics     []  Vasocompression     []  Other       TOTAL TREATMENT TIME: 46 min    Time in:8:07 am      Time out: 8:53 am    Electronically signed by: Jennifer Vick PT        Physician Signature:________________________________Date:__________________  By signing above or cosigning this note, I have reviewed this plan of care and certify a need for medically necessary rehabilitation services.      *PLEASE SIGN ABOVE AND FAX BACK ALL PAGES*

## 2022-09-27 NOTE — PRE-CERTIFICATION NOTE
..       [x] [facility] [facility]  [address]  [phone]  F: [phone] [] [address]  [place] [address]   [address]  P: [phone]  F: [phone] [] [place]  [address] [facility]  P: [phone]  F: [phone] [] [address]  [facility]   [address]   [place]: [phone]  F: [phone]  [] Mati Benavidez   Outpatient Occupational Therapy  [address] [facility] [address]: [phone]  F: [phone]          Therapy Pre-certification Note      9/27/2022    Edenilson Germain  1960   [id number]      Insurance approval was received for Physical Therapy from [facility] on 9/27/22. Approval was received for 10 visits, from 9/22/22 to 12/21/22. Authorization number [id number]. CPT codes approved: 41301; 37433; [phone]; [id number]; 69388 x 40 units    Patient was contacted to be scheduled and has been scheduled for a follow-up visit.       Electronically signed by Jennifer Rojas on 9/27/2022 at 4:32 PM

## 2022-10-17 ENCOUNTER — OFFICE VISIT (OUTPATIENT)
Dept: FAMILY MEDICINE CLINIC | Age: 62
End: 2022-10-17
Payer: MEDICAID

## 2022-10-17 ENCOUNTER — HOSPITAL ENCOUNTER (OUTPATIENT)
Age: 62
Setting detail: SPECIMEN
Discharge: HOME | End: 2022-10-17

## 2022-10-17 VITALS
BODY MASS INDEX: 36.83 KG/M2 | HEART RATE: 78 BPM | SYSTOLIC BLOOD PRESSURE: 148 MMHG | DIASTOLIC BLOOD PRESSURE: 75 MMHG | WEIGHT: 243 LBS | OXYGEN SATURATION: 99 % | HEIGHT: 68 IN

## 2022-10-17 DIAGNOSIS — E11.69 TYPE 2 DIABETES MELLITUS WITH OTHER SPECIFIED COMPLICATION, WITHOUT LONG-TERM CURRENT USE OF INSULIN (HCC): ICD-10-CM

## 2022-10-17 DIAGNOSIS — C61 PROSTATE CANCER (HCC): ICD-10-CM

## 2022-10-17 DIAGNOSIS — Z76.89 ESTABLISHING CARE WITH NEW DOCTOR, ENCOUNTER FOR: Primary | ICD-10-CM

## 2022-10-17 DIAGNOSIS — E78.2 MIXED HYPERLIPIDEMIA: ICD-10-CM

## 2022-10-17 DIAGNOSIS — I10 PRIMARY HYPERTENSION: ICD-10-CM

## 2022-10-17 PROBLEM — E11.9 DIABETES MELLITUS (HCC): Status: ACTIVE | Noted: 2022-10-17

## 2022-10-17 LAB
PROSTATE SPECIFIC ANTIGEN: <0.02 NG/ML
SEX HORMONE BINDING GLOBULIN: 23 NMOL/L (ref 11–80)
TESTOSTERONE FREE-NONMALE: 55.2 PG/ML (ref 47–244)
TESTOSTERONE TOTAL: 236 NG/DL (ref 220–1000)

## 2022-10-17 PROCEDURE — 3017F COLORECTAL CA SCREEN DOC REV: CPT | Performed by: FAMILY MEDICINE

## 2022-10-17 PROCEDURE — 1036F TOBACCO NON-USER: CPT | Performed by: FAMILY MEDICINE

## 2022-10-17 PROCEDURE — G8427 DOCREV CUR MEDS BY ELIG CLIN: HCPCS | Performed by: FAMILY MEDICINE

## 2022-10-17 PROCEDURE — 2022F DILAT RTA XM EVC RTNOPTHY: CPT | Performed by: FAMILY MEDICINE

## 2022-10-17 PROCEDURE — 3051F HG A1C>EQUAL 7.0%<8.0%: CPT | Performed by: FAMILY MEDICINE

## 2022-10-17 PROCEDURE — G8417 CALC BMI ABV UP PARAM F/U: HCPCS | Performed by: FAMILY MEDICINE

## 2022-10-17 PROCEDURE — 99205 OFFICE O/P NEW HI 60 MIN: CPT | Performed by: FAMILY MEDICINE

## 2022-10-17 PROCEDURE — G8484 FLU IMMUNIZE NO ADMIN: HCPCS | Performed by: FAMILY MEDICINE

## 2022-10-17 RX ORDER — LANCETS 30 GAUGE
1 EACH MISCELLANEOUS 2 TIMES DAILY
Qty: 100 EACH | Refills: 5 | Status: SHIPPED | OUTPATIENT
Start: 2022-10-17

## 2022-10-17 RX ORDER — OLMESARTAN MEDOXOMIL AND HYDROCHLOROTHIAZIDE 40/25 40; 25 MG/1; MG/1
1 TABLET ORAL DAILY
Qty: 30 TABLET | Refills: 3 | Status: SHIPPED | OUTPATIENT
Start: 2022-10-17

## 2022-10-17 RX ORDER — TAMSULOSIN HYDROCHLORIDE 0.4 MG/1
0.4 CAPSULE ORAL DAILY
Qty: 30 CAPSULE | Refills: 3 | Status: SHIPPED | OUTPATIENT
Start: 2022-10-17

## 2022-10-17 RX ORDER — GLUCOSAMINE HCL/CHONDROITIN SU 500-400 MG
CAPSULE ORAL
Qty: 100 STRIP | Refills: 5 | Status: SHIPPED | OUTPATIENT
Start: 2022-10-17

## 2022-10-17 RX ORDER — BLOOD-GLUCOSE METER
1 KIT MISCELLANEOUS DAILY
Qty: 1 KIT | Refills: 0 | Status: SHIPPED | OUTPATIENT
Start: 2022-10-17

## 2022-10-17 SDOH — ECONOMIC STABILITY: FOOD INSECURITY: WITHIN THE PAST 12 MONTHS, THE FOOD YOU BOUGHT JUST DIDN'T LAST AND YOU DIDN'T HAVE MONEY TO GET MORE.: NEVER TRUE

## 2022-10-17 SDOH — ECONOMIC STABILITY: FOOD INSECURITY: WITHIN THE PAST 12 MONTHS, YOU WORRIED THAT YOUR FOOD WOULD RUN OUT BEFORE YOU GOT MONEY TO BUY MORE.: NEVER TRUE

## 2022-10-17 ASSESSMENT — PATIENT HEALTH QUESTIONNAIRE - PHQ9
SUM OF ALL RESPONSES TO PHQ QUESTIONS 1-9: 0
1. LITTLE INTEREST OR PLEASURE IN DOING THINGS: 0
SUM OF ALL RESPONSES TO PHQ QUESTIONS 1-9: 0
SUM OF ALL RESPONSES TO PHQ9 QUESTIONS 1 & 2: 0
2. FEELING DOWN, DEPRESSED OR HOPELESS: 0

## 2022-10-17 ASSESSMENT — SOCIAL DETERMINANTS OF HEALTH (SDOH): HOW HARD IS IT FOR YOU TO PAY FOR THE VERY BASICS LIKE FOOD, HOUSING, MEDICAL CARE, AND HEATING?: NOT HARD AT ALL

## 2022-10-17 NOTE — PROGRESS NOTES
[facility]  [address] [place] [address] [address] [facility] [place] [id number]  Dept: [phone]      Julieann Scheuermann is a 63 y.o. male who presents today for follow up on his  medical conditions as noted below.       Chief Complaint   Patient presents with    New Patient       Patient Active Problem List:     Plantar wart     Dysuria     Prostate cancer ([facility]     Chest pain     Blurry vision, left eye     Weakness of left lower extremity     Diabetes mellitus ([facility] [address]     Primary hypertension     Mixed hyperlipidemia     Past Medical History:   Diagnosis Date    Cancer ([facility] [address] 2019    prostate    Hematuria     Hypertension     Intermittent abdominal pain     Nerve injury     left leg , states  s/p prostatectomy    DILAN (obstructive sleep apnea)     DOES NOT HAVE MACHINE    Prediabetes       Past Surgical History:   Procedure Laterality Date    APPENDECTOMY      CIRCUMCISION N/A 07/10/2020    CIRCUMCISION performed by Etienne Lopez MD at [address] N/A 02/01/2021    COLONOSCOPY POLYPECTOMY HOT BIOPSY performed by Tino Jiménez MD at [facility] N/A 02/07/2019    [facility] N/A 05/28/2020    CYSTOSCOPY performed by Etienne Lopez MD at [address] [place] [address] Road  06/25/2021    [facility] 06/25/2021    XI ROBOTIC [facility] performed by America Diaz IV, DO at [facility] Right 2004    NECK    PROSTATE BIOPSY N/A 03/21/2019    PROSTATE BIOPSY, ULTRASOUND  (OFFICE TO CONTACT [place] performed by Etienne Lopez MD at [facility] N/A 05/31/2019    XI LAPAROSCOPIC ROBOTIC RADICAL PROSTATECTOMY, PELVIC LYMPH NODE DISSECTION performed by Etienne Lopez MD at [address]  05/2022     Family History   Problem Relation Age of Onset    Other Mother         Kidney issues    High Blood Pressure Mother     No Known Problems Father     Lupus Sister     High Blood Pressure Sister     No Known Problems Sister     No Known Problems Sister     No Known Problems Sister     No Known Problems Brother     No Known Problems Brother     No Known Problems Brother     No Known Problems Brother     No Known Problems Maternal Grandmother     No Known Problems Maternal Grandfather     No Known Problems Paternal Grandmother     No Known Problems Paternal Grandfather        Current Outpatient Medications   Medication Sig Dispense Refill    metFORMIN (GLUCOPHAGE) 500 MG tablet       olmesartan-hydroCHLOROthiazide (BENICAR HCT) 40-25 MG per tablet Take 1 tablet by mouth daily 30 tablet 3    tamsulosin (FLOMAX) 0.4 MG capsule Take 1 capsule by mouth daily 30 capsule 3    dapagliflozin (FARXIGA) 10 MG tablet Take 1 tablet by mouth every morning 30 tablet 5    blood glucose monitor strips Test 2 times a day & as needed for symptoms of irregular blood glucose. Dispense sufficient amount for indicated testing frequency plus additional to accommodate PRN testing needs. 100 strip 5    glucose monitoring (FREESTYLE FREEDOM) kit 1 kit by Does not apply route daily 1 kit 0    Lancets MISC 1 each by Does not apply route 2 times daily 100 each 5    meclizine (ANTIVERT) 25 MG CHEW CHEW AND SWALLOW 1 TABLET BY MOUTH THREE TIMES DAILY AS NEEDED      meloxicam (MOBIC) 7.5 MG tablet Take 7.5 mg by mouth in the morning. atorvastatin (LIPITOR) 40 MG tablet Take 1 tablet by mouth nightly 30 tablet 0    nitroGLYCERIN (NITROSTAT) 0.4 MG SL tablet up to max of 3 total doses.  If no relief after 1 dose, call 911. 25 tablet 0    Ascorbic Acid (VITAMIN C PO) Take by mouth (Patient not taking: Reported on 10/17/2022)      sodium chloride (OCEAN) 0.65 % nasal spray 2 sprays by Nasal route 4 times daily as needed (Patient not taking: Reported on 10/17/2022)      magnesium 200 MG TABS tablet Take 1 tablet by mouth in the morning. (Patient not taking: Reported on 10/17/2022) 90 tablet 2    aspirin 81 MG chewable tablet Take 1 tablet by mouth daily (Patient not taking: No sig reported) 30 tablet 0    docusate sodium (COLACE) 100 MG capsule Take 1 capsule by mouth daily as needed for Constipation (Patient not taking: No sig reported) 30 capsule 1    vitamin D (ERGOCALCIFEROL) 1.25 MG (06247 UT) CAPS capsule TK ONE CAPSULE PO ONCE A WEEK (Patient not taking: No sig reported)      Multiple Vitamins-Minerals (THERAPEUTIC MULTIVITAMIN-MINERALS) tablet Take 1 tablet by mouth daily (Patient not taking: Reported on 10/17/2022)       No current facility-administered medications for this visit. ALLERGIES:  No Known Allergies    Social History     Tobacco Use    Smoking status: Never    Smokeless tobacco: Never   Substance Use Topics    Alcohol use: No        LDL Cholesterol (mg/dL)   Date Value   08/01/2022 116     HDL (mg/dL)   Date Value   08/01/2022 48     BUN (mg/dL)   Date Value   08/01/2022 18     Creatinine (mg/dL)   Date Value   08/01/2022 1.03     Glucose (mg/dL)   Date Value   08/01/2022 179 (H)     Hemoglobin A1C (%)   Date Value   08/01/2022 7.4 (H)     Microalb/Crt.  Ratio (mcg/mg creat)   Date Value   09/08/2021 15              Subjective:      HPI  He is being seen today as a new patient to establish care  He has a multitude of ongoing medical problems some of these have not been well addressed his blood pressure has been running high for a considerable period of time  He does take his medication every day and did take it this morning  He has an elevated hemoglobin A1c last recorded reading in August was 7.4 he was shocked to find out that he actually has diabetes he has been taking metformin but never knew he was taking it because he asked actual diabetes  He is never been prescribed a glucometer or sent to diabetic education classes and asked me if eating sweets is bad because he eats a lot of sweets  He has had a history of prostate CA had a prostatectomy has not been back to see urology in over a year and on his last laboratory studies a PSA was not done  He states he has issues with urination where sometimes he feels as though he cannot empty  He thought maybe his diuretic and his blood pressure pills has something to do with causing that problem  He also has hypercholesterolemic his last numbers on that seem to be doing okay and he currently is taking a statin    Review of Systems:     Constitutional: Negative for fever, appetite change and fatigue. Family social and medical history reviewed and unchanged     HENT: Negative. Negative for nosebleeds, trouble swallowing and neck pain. Eyes: Negative for photophobia and visual disturbance. Respiratory: Negative. Negative for chest tightness and shortness of breath. Cardiovascular: Negative. Negative for chest pain and leg swelling. Gastrointestinal: Negative. Negative for abdominal pain and blood in stool. Endocrine: Negative for cold intolerance and polyuria. Genitourinary: Negative for dysuria and hematuria. Musculoskeletal: Negative. Skin: Negative for rash. Allergic/Immunologic: Negative. Neurological: Negative. Negative for dizziness, weakness and numbness. Hematological: Negative. Negative for adenopathy. Does not bruise/bleed easily. Psychiatric/Behavioral: Negative for sleep disturbance, dysphoric mood and  decreased concentration. The patient is not nervous/anxious. Objective:     Physical Exam:     Nursing note and vitals reviewed. BP (!) 148/75   Pulse 78   Ht 5' 8\" (1.727 m)   Wt 243 lb (110.2 kg)   SpO2 99%   BMI 36.95 kg/m²   Constitutional: He is oriented to person, place, and time. He   appears well-developed and well-nourished. HENT:   Head: Normocephalic and atraumatic. Right Ear: External ear normal. Tympanic membrane is not erythematous. No middle ear effusion.     Left Ear: External ear normal. Tympanic membrane is not erythematous. No middle ear effusion. Nose: No mucosal edema. Mouth/Throat: Oropharynx is clear and moist. No posterior oropharyngeal erythema. Eyes: Conjunctivae and EOM are normal. Pupils are equal, round, and reactive to light. Neck: Normal range of motion. Neck supple. No thyromegaly present. Cardiovascular: Normal rate, regular rhythm and normal heart sounds. No murmur heard. Pulmonary/Chest: Effort normal and breath sounds normal. He has no wheezes. Hehas no rales. Abdominal: Soft. Bowel sounds are normal. He exhibits no distension and no mass. There is no tenderness. There is no rebound and no guarding. Genitourinary/Anorectal:deferred  Musculoskeletal: Normal range of motion. He exhibits no edema or tenderness. Lymphadenopathy: He has no cervical adenopathy. Neurological: He is alert and oriented to person, place, and time. He has normal reflexes. Skin: Skin is warm and dry. No rash noted. Psychiatric: He has a normal mood and affect. His   behavior is normal.       Assessment:      1. Establishing care with new doctor, encounter for    2. Type 2 diabetes mellitus with other specified complication, without long-term current use of insulin ([address]    3. Prostate cancer ([address]    4. Primary hypertension    5. Mixed hyperlipidemia          Plan:      Call or return to clinic prn if these symptoms worsen or fail to improve as anticipated. I have reviewed the instructions with the patient, answering all questions to his satisfaction. No follow-ups on file.   Orders Placed This Encounter   Procedures    PSA, Prostatic Specific Antigen     Standing Status:   Future     Number of Occurrences:   1     Standing Expiration Date:   4/17/2023    Testosterone, Free     Standing Status:   Future     Number of Occurrences:   1     Standing Expiration Date:   11/17/2022    [facility] Diabetes Education - [address]     Referral Priority:   Routine     Referral Type:   Eval and Treat Referral Reason:   Specialty Services Required     Number of Visits Requested:   1     Orders Placed This Encounter   Medications    olmesartan-hydroCHLOROthiazide (BENICAR HCT) 40-25 MG per tablet     Sig: Take 1 tablet by mouth daily     Dispense:  30 tablet     Refill:  3    tamsulosin (FLOMAX) 0.4 MG capsule     Sig: Take 1 capsule by mouth daily     Dispense:  30 capsule     Refill:  3    dapagliflozin (FARXIGA) 10 MG tablet     Sig: Take 1 tablet by mouth every morning     Dispense:  30 tablet     Refill:  5    blood glucose monitor strips     Sig: Test 2 times a day & as needed for symptoms of irregular blood glucose. Dispense sufficient amount for indicated testing frequency plus additional to accommodate PRN testing needs. Dispense:  100 strip     Refill:  5     Brand per patient preference. May round up to next available package size.     glucose monitoring (FREESTYLE FREEDOM) kit     Si kit by Does not apply route daily     Dispense:  1 kit     Refill:  0    Lancets MISC     Si each by Does not apply route 2 times daily     Dispense:  100 each     Refill:  5     I discussed with patient he is in fact diabetic he needs to start checking his sugars go to diabetic education classes avoid sweets and carbs but diabetic education we will talk with him in better detail about how to improve his diet and help with weight loss  Continue on metformin I am adding Farxiga  DC lisinopril start Benicar  Check a PSA  Follow-up with urology  Add Flomax see if this will help with his urination issues and follow-up with me in 3 months and sooner if any other complaints or problems  Electronically signed by Carlos Collier DO on 10/17/2022 at 11:18 AM

## 2022-10-27 ENCOUNTER — PROCEDURE VISIT (OUTPATIENT)
Dept: PHYSICAL MEDICINE AND REHAB | Age: 62
End: 2022-10-27
Payer: MEDICAID

## 2022-10-27 DIAGNOSIS — R29.898 WEAKNESS OF LEFT LOWER EXTREMITY: ICD-10-CM

## 2022-10-27 PROCEDURE — 95886 MUSC TEST DONE W/N TEST COMP: CPT | Performed by: PHYSICAL MEDICINE & REHABILITATION

## 2022-10-27 PROCEDURE — 95910 NRV CNDJ TEST 7-8 STUDIES: CPT | Performed by: PHYSICAL MEDICINE & REHABILITATION

## 2022-10-31 ENCOUNTER — HOSPITAL ENCOUNTER (OUTPATIENT)
Dept: DIABETES SERVICES | Age: 62
Setting detail: THERAPIES SERIES
Discharge: HOME | End: 2022-10-31
Payer: MEDICAID

## 2022-10-31 DIAGNOSIS — E11.69 TYPE 2 DIABETES MELLITUS WITH OTHER SPECIFIED COMPLICATION, WITHOUT LONG-TERM CURRENT USE OF INSULIN (HCC): Primary | ICD-10-CM

## 2022-10-31 PROCEDURE — G0108 DIAB MANAGE TRN  PER INDIV: HCPCS

## 2022-10-31 SDOH — ECONOMIC STABILITY: FOOD INSECURITY: ADDITIONAL INFORMATION: NO

## 2022-10-31 ASSESSMENT — PROBLEM AREAS IN DIABETES QUESTIONNAIRE (PAID)
FEELING THAT DIABETES IS TAKING UP TOO MUCH OF YOUR MENTAL AND PHYSICAL ENERGY EVERY DAY: 0
WORRYING ABOUT THE FUTURE AND THE POSSIBILITY OF SERIOUS COMPLICATIONS: 0
FEELING SCARED WHEN YOU THINK ABOUT LIVING WITH DIABETES: 0
PAID-5 TOTAL SCORE: 1
COPING WITH COMPLICATIONS OF DIABETES: 0
FEELING DEPRESSED WHEN YOU THINK ABOUT LIVING WITH DIABETES: 1
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
## 2022-10-31 NOTE — LETTER
[facility]  [address]  [facility] [address]  Phone: [phone]         October 31, 2022    To : Dr. Yana Strickland and CARMELA Drake    From: Nadia Diggs RN    Patient: Javi German   YOB: 1960   Date of Visit: 10/31/22     An initial assessment to determine diabetes education needs was completed. Education plan includes :interpretation of lab results, blood sugar goals, complications of diabetes mellitus, hypoglycemia prevention and treatment, exercise, illness management, self-monitoring of blood glucose skills, nutrition, carbohydrate counting and role of diabetes medications. An ongoing plan was created to include: follow up session in one week    Thank you for the opportunity to provide Diabetes Self Management Education to your patient.

## 2022-11-07 ENCOUNTER — HOSPITAL ENCOUNTER (OUTPATIENT)
Dept: DIABETES SERVICES | Age: 62
Setting detail: THERAPIES SERIES
Discharge: HOME | End: 2022-11-07
Payer: MEDICAID

## 2022-11-07 PROCEDURE — G0108 DIAB MANAGE TRN  PER INDIV: HCPCS
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
## 2022-11-21 ENCOUNTER — HOSPITAL ENCOUNTER (OUTPATIENT)
Dept: DIABETES SERVICES | Age: 62
Setting detail: THERAPIES SERIES
Discharge: HOME | End: 2022-11-21
Payer: MEDICAID

## 2022-11-21 ENCOUNTER — TELEPHONE (OUTPATIENT)
Dept: DIABETES SERVICES | Age: 62
End: 2022-11-21

## 2022-11-21 DIAGNOSIS — E11.69 TYPE 2 DIABETES MELLITUS WITH OTHER SPECIFIED COMPLICATION, WITHOUT LONG-TERM CURRENT USE OF INSULIN (HCC): Primary | ICD-10-CM

## 2022-11-21 PROCEDURE — G0108 DIAB MANAGE TRN  PER INDIV: HCPCS

## 2022-11-21 NOTE — PROGRESS NOTES
Met with patient for diabetes education   Reviewed with patient his home medication list and meds in hand at home; He does not have any metformin pills-  he needs  500 mg BID RX , also he does not have meclizine 25 mg TID pills and needs these pills as well.     He is rescheduled to see you soon    Thanks    Allyson Lee
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
## 2022-11-30 ENCOUNTER — OFFICE VISIT (OUTPATIENT)
Dept: FAMILY MEDICINE CLINIC | Age: 62
End: 2022-11-30
Payer: MEDICAID

## 2022-11-30 ENCOUNTER — HOSPITAL ENCOUNTER (OUTPATIENT)
Age: 62
Setting detail: SPECIMEN
Discharge: HOME | End: 2022-11-30

## 2022-11-30 VITALS
OXYGEN SATURATION: 95 % | BODY MASS INDEX: 36.53 KG/M2 | DIASTOLIC BLOOD PRESSURE: 55 MMHG | HEART RATE: 84 BPM | WEIGHT: 241 LBS | SYSTOLIC BLOOD PRESSURE: 122 MMHG | HEIGHT: 68 IN

## 2022-11-30 DIAGNOSIS — E11.69 TYPE 2 DIABETES MELLITUS WITH OTHER SPECIFIED COMPLICATION, WITHOUT LONG-TERM CURRENT USE OF INSULIN (HCC): ICD-10-CM

## 2022-11-30 DIAGNOSIS — E16.2 HYPOGLYCEMIA: ICD-10-CM

## 2022-11-30 DIAGNOSIS — E11.69 TYPE 2 DIABETES MELLITUS WITH OTHER SPECIFIED COMPLICATION, WITHOUT LONG-TERM CURRENT USE OF INSULIN (HCC): Primary | ICD-10-CM

## 2022-11-30 DIAGNOSIS — E78.2 MIXED HYPERLIPIDEMIA: ICD-10-CM

## 2022-11-30 DIAGNOSIS — I10 PRIMARY HYPERTENSION: ICD-10-CM

## 2022-11-30 LAB
ESTIMATED AVERAGE GLUCOSE: 126 MG/DL
HBA1C MFR BLD: 6 % (ref 4–6)

## 2022-11-30 PROCEDURE — 3074F SYST BP LT 130 MM HG: CPT | Performed by: FAMILY MEDICINE

## 2022-11-30 PROCEDURE — G8417 CALC BMI ABV UP PARAM F/U: HCPCS | Performed by: FAMILY MEDICINE

## 2022-11-30 PROCEDURE — 2022F DILAT RTA XM EVC RTNOPTHY: CPT | Performed by: FAMILY MEDICINE

## 2022-11-30 PROCEDURE — 3017F COLORECTAL CA SCREEN DOC REV: CPT | Performed by: FAMILY MEDICINE

## 2022-11-30 PROCEDURE — G8427 DOCREV CUR MEDS BY ELIG CLIN: HCPCS | Performed by: FAMILY MEDICINE

## 2022-11-30 PROCEDURE — 3051F HG A1C>EQUAL 7.0%<8.0%: CPT | Performed by: FAMILY MEDICINE

## 2022-11-30 PROCEDURE — 99214 OFFICE O/P EST MOD 30 MIN: CPT | Performed by: FAMILY MEDICINE

## 2022-11-30 PROCEDURE — G8484 FLU IMMUNIZE NO ADMIN: HCPCS | Performed by: FAMILY MEDICINE

## 2022-11-30 PROCEDURE — 3078F DIAST BP <80 MM HG: CPT | Performed by: FAMILY MEDICINE

## 2022-11-30 PROCEDURE — 1036F TOBACCO NON-USER: CPT | Performed by: FAMILY MEDICINE

## 2022-11-30 RX ORDER — ATORVASTATIN CALCIUM 40 MG/1
40 TABLET, FILM COATED ORAL NIGHTLY
Qty: 90 TABLET | Refills: 3 | Status: SHIPPED | OUTPATIENT
Start: 2022-11-30

## 2022-11-30 ASSESSMENT — PATIENT HEALTH QUESTIONNAIRE - PHQ9
SUM OF ALL RESPONSES TO PHQ9 QUESTIONS 1 & 2: 0
SUM OF ALL RESPONSES TO PHQ QUESTIONS 1-9: 0
SUM OF ALL RESPONSES TO PHQ QUESTIONS 1-9: 0
2. FEELING DOWN, DEPRESSED OR HOPELESS: 0
SUM OF ALL RESPONSES TO PHQ QUESTIONS 1-9: 0
1. LITTLE INTEREST OR PLEASURE IN DOING THINGS: 0
SUM OF ALL RESPONSES TO PHQ QUESTIONS 1-9: 0

## 2022-11-30 NOTE — PROGRESS NOTES
[facility]  [address] [place] [address] [address] [facility] [address]  Dept: [phone]      Toshia Ramirez is a 63 y.o. male who presents today for follow up on his  medical conditions as noted below.       Chief Complaint   Patient presents with    Leg Pain     Left        Patient Active Problem List:     Plantar wart     Dysuria     Prostate cancer ([facility] [address]     Chest pain     Blurry vision, left eye     Weakness of left lower extremity     Diabetes mellitus ([facility] [address]     Primary hypertension     Mixed hyperlipidemia     Past Medical History:   Diagnosis Date    Cancer ([facility] [address] 2019    prostate    Hematuria     Hypertension     Intermittent abdominal pain     Nerve injury     left leg , states  s/p prostatectomy    DILAN (obstructive sleep apnea)     DOES NOT HAVE MACHINE    Prediabetes       Past Surgical History:   Procedure Laterality Date    APPENDECTOMY      CIRCUMCISION N/A 07/10/2020    CIRCUMCISION performed by Germain Wagner MD at [address] [facility] N/A 02/01/2021    COLONOSCOPY POLYPECTOMY HOT BIOPSY performed by Jonathan Meza MD at [address] [facility] N/A 02/07/2019    CYSTOSCOPY performed by Germain Wagner MD at [address] [facility] N/A 05/28/2020    CYSTOSCOPY performed by Germain Wagner MD at [facility]  06/25/2021    XI ROBOTIC [facility] N/A 06/25/2021    XI ROBOTIC [facility] performed by To Diaz IV, DO at [place] [address] Right 2004    NECK    PROSTATE BIOPSY N/A 03/21/2019    PROSTATE BIOPSY, ULTRASOUND  (OFFICE TO CONTACT [place] performed by Germain Wagner MD at [facility] N/A 05/31/2019    XI LAPAROSCOPIC ROBOTIC RADICAL PROSTATECTOMY, PELVIC LYMPH NODE DISSECTION performed by Germain Wagner MD at [address]  05/2022     Family History   Problem Relation Age of Onset    Other Mother Kidney issues    High Blood Pressure Mother     No Known Problems Father     Lupus Sister     High Blood Pressure Sister     No Known Problems Sister     No Known Problems Sister     No Known Problems Sister     No Known Problems Brother     No Known Problems Brother     No Known Problems Brother     No Known Problems Brother     No Known Problems Maternal Grandmother     No Known Problems Maternal Grandfather     No Known Problems Paternal Grandmother     No Known Problems Paternal Grandfather        Current Outpatient Medications   Medication Sig Dispense Refill    atorvastatin (LIPITOR) 40 MG tablet Take 1 tablet by mouth nightly 90 tablet 3    metFORMIN (GLUCOPHAGE) 500 MG tablet       olmesartan-hydroCHLOROthiazide (BENICAR HCT) 40-25 MG per tablet Take 1 tablet by mouth daily 30 tablet 3    tamsulosin (FLOMAX) 0.4 MG capsule Take 1 capsule by mouth daily 30 capsule 3    dapagliflozin (FARXIGA) 10 MG tablet Take 1 tablet by mouth every morning 30 tablet 5    blood glucose monitor strips Test 2 times a day & as needed for symptoms of irregular blood glucose. Dispense sufficient amount for indicated testing frequency plus additional to accommodate PRN testing needs. 100 strip 5    glucose monitoring (FREESTYLE FREEDOM) kit 1 kit by Does not apply route daily 1 kit 0    Lancets MISC 1 each by Does not apply route 2 times daily 100 each 5    Ascorbic Acid (VITAMIN C PO) Take by mouth      meclizine (ANTIVERT) 25 MG CHEW CHEW AND SWALLOW 1 TABLET BY MOUTH THREE TIMES DAILY AS NEEDED      meloxicam (MOBIC) 7.5 MG tablet Take 7.5 mg by mouth in the morning.       docusate sodium (COLACE) 100 MG capsule Take 1 capsule by mouth daily as needed for Constipation 30 capsule 1    Multiple Vitamins-Minerals (THERAPEUTIC MULTIVITAMIN-MINERALS) tablet Take 1 tablet by mouth daily      sodium chloride (OCEAN) 0.65 % nasal spray 2 sprays by Nasal route 4 times daily as needed (Patient not taking: No sig reported)      magnesium 200 MG TABS tablet Take 1 tablet by mouth in the morning. (Patient not taking: No sig reported) 90 tablet 2    aspirin 81 MG chewable tablet Take 1 tablet by mouth daily (Patient not taking: No sig reported) 30 tablet 0    nitroGLYCERIN (NITROSTAT) 0.4 MG SL tablet up to max of 3 total doses. If no relief after 1 dose, call 911. (Patient not taking: No sig reported) 25 tablet 0    vitamin D (ERGOCALCIFEROL) 1.25 MG (54493 UT) CAPS capsule TK ONE CAPSULE PO ONCE A WEEK (Patient not taking: No sig reported)       No current facility-administered medications for this visit. ALLERGIES:  No Known Allergies    Social History     Tobacco Use    Smoking status: Never    Smokeless tobacco: Never   Substance Use Topics    Alcohol use: No        LDL Cholesterol (mg/dL)   Date Value   08/01/2022 116     HDL (mg/dL)   Date Value   08/01/2022 48     BUN (mg/dL)   Date Value   08/01/2022 18     Creatinine (mg/dL)   Date Value   08/01/2022 1.03     Glucose (mg/dL)   Date Value   08/01/2022 179 (H)     Hemoglobin A1C (%)   Date Value   08/01/2022 7.4 (H)     Microalb/Crt. Ratio (mcg/mg creat)   Date Value   09/08/2021 15              Subjective:      HPI  Being seen today for follow-up on high blood pressure diabetes and had some questions about his meds he states that he needed a refill of meclizine but I asked him why he needed to get that and he states he has episodes where he gets dizzy lightheaded sweaty almost feels like is going to pass out. He does go long periods of time without eating. He has been to diabetic education classes    Review of Systems:     Constitutional: Negative for fever, appetite change and fatigue. Family social and medical history reviewed and unchanged     HENT: Negative. Negative for nosebleeds, trouble swallowing and neck pain. Eyes: Negative for photophobia and visual disturbance. Respiratory: Negative. Negative for chest tightness and shortness of breath.     Cardiovascular: Negative. Negative for chest pain and leg swelling. Gastrointestinal: Negative. Negative for abdominal pain and blood in stool. Endocrine: Negative for cold intolerance and polyuria. Genitourinary: Negative for dysuria and hematuria. Musculoskeletal: Negative. Skin: Negative for rash. Allergic/Immunologic: Negative. Neurological: Negative. Negative for dizziness, weakness and numbness. Hematological: Negative. Negative for adenopathy. Does not bruise/bleed easily. Psychiatric/Behavioral: Negative for sleep disturbance, dysphoric mood and  decreased concentration. The patient is not nervous/anxious. Objective:     Physical Exam:     Nursing note and vitals reviewed. BP (!) 122/55   Pulse 84   Ht 5' 8\" (1.727 m)   Wt 241 lb (109.3 kg)   SpO2 95%   BMI 36.64 kg/m²   Constitutional: He is oriented to person, place, and time. He   appears well-developed and well-nourished. HENT:   Head: Normocephalic and atraumatic. Right Ear: External ear normal. Tympanic membrane is not erythematous. No middle ear effusion. Left Ear: External ear normal. Tympanic membrane is not erythematous. No middle ear effusion. Nose: No mucosal edema. Mouth/Throat: Oropharynx is clear and moist. No posterior oropharyngeal erythema. Eyes: Conjunctivae and EOM are normal. Pupils are equal, round, and reactive to light. Neck: Normal range of motion. Neck supple. No thyromegaly present. Cardiovascular: Normal rate, regular rhythm and normal heart sounds. No murmur heard. Pulmonary/Chest: Effort normal and breath sounds normal. He has no wheezes. Hehas no rales. Abdominal: Soft. Bowel sounds are normal. He exhibits no distension and no mass. There is no tenderness. There is no rebound and no guarding. Genitourinary/Anorectal:deferred  Musculoskeletal: Normal range of motion. He exhibits no edema or tenderness. Lymphadenopathy: He has no cervical adenopathy.    Neurological: He is alert and oriented to person, place, and time. He has normal reflexes. Skin: Skin is warm and dry. No rash noted. Psychiatric: He has a normal mood and affect. His   behavior is normal.       Assessment:      1. Type 2 diabetes mellitus with other specified complication, without long-term current use of insulin ([facility] [address]    2. Mixed hyperlipidemia    3. Primary hypertension    4. Hypoglycemia          Plan:      Call or return to clinic prn if these symptoms worsen or fail to improve as anticipated. I have reviewed the instructions with the patient, answering all questions to his satisfaction. Return if symptoms worsen or fail to improve.   Orders Placed This Encounter   Procedures    Hemoglobin A1C     Standing Status:   Future     Standing Expiration Date:   11/30/2023     Orders Placed This Encounter   Medications    atorvastatin (LIPITOR) 40 MG tablet     Sig: Take 1 tablet by mouth nightly     Dispense:  90 tablet     Refill:  3     I discussed with him he is having hypoglycemic episodes he cannot go all day without eating he needs to be eating a breakfast lunch and dinner  And I do not think meclizine would be indicated for him at all  He also is a little confused about what each medication was for so I reviewed that with him and wrote it on his med list  Electronically signed by Luigi Goldmann, DO on 11/30/2022 at 2:16 PM

## 2022-12-01 ENCOUNTER — OFFICE VISIT (OUTPATIENT)
Dept: NEUROLOGY | Age: 62
End: 2022-12-01
Payer: MEDICAID

## 2022-12-01 VITALS
DIASTOLIC BLOOD PRESSURE: 66 MMHG | SYSTOLIC BLOOD PRESSURE: 126 MMHG | WEIGHT: 245 LBS | BODY MASS INDEX: 37.13 KG/M2 | HEART RATE: 83 BPM | HEIGHT: 68 IN

## 2022-12-01 DIAGNOSIS — G57.32 NEUROPATHY OF LEFT PERONEAL NERVE: Primary | ICD-10-CM

## 2022-12-01 PROCEDURE — G8484 FLU IMMUNIZE NO ADMIN: HCPCS | Performed by: PSYCHIATRY & NEUROLOGY

## 2022-12-01 PROCEDURE — G8427 DOCREV CUR MEDS BY ELIG CLIN: HCPCS | Performed by: PSYCHIATRY & NEUROLOGY

## 2022-12-01 PROCEDURE — 99214 OFFICE O/P EST MOD 30 MIN: CPT | Performed by: PSYCHIATRY & NEUROLOGY

## 2022-12-01 PROCEDURE — 1036F TOBACCO NON-USER: CPT | Performed by: PSYCHIATRY & NEUROLOGY

## 2022-12-01 PROCEDURE — 3074F SYST BP LT 130 MM HG: CPT | Performed by: PSYCHIATRY & NEUROLOGY

## 2022-12-01 PROCEDURE — G8417 CALC BMI ABV UP PARAM F/U: HCPCS | Performed by: PSYCHIATRY & NEUROLOGY

## 2022-12-01 PROCEDURE — 3017F COLORECTAL CA SCREEN DOC REV: CPT | Performed by: PSYCHIATRY & NEUROLOGY

## 2022-12-01 PROCEDURE — 3078F DIAST BP <80 MM HG: CPT | Performed by: PSYCHIATRY & NEUROLOGY

## 2022-12-01 NOTE — PROGRESS NOTES
[facility], [address], [address] [place] [address]  Ph: [phone] or [phone]  FAX: [phone]    Chief Complaint: Vision loss, knee pain     Dear Micheline Crawley, DO     I had the pleasure of seeing your patient today in neurology consultation for his symptoms. As you would recall Kira Lee is a 63 y.o. male. He was admitted on 3/26/2022 with concern of chest pain and transient vision loss. Patient is borderline diabetic and has history of hypertension and sleep apnea. As per note, he was driving when he had sudden onset of blurry vision in his left eye which lasted for 15 to 20 minutes before he developed diaphoresis and chest pain accompanied by nausea and vomiting. The vomiting continued and patient pulled over and called his daughter to take him to the hospital. The patient has had intermittent episodes of blurred vision for 5 years that was occurring 1-2 times per week. No family history of strokes or seizure or MS. patient is not a smoker and does not utilize alcohol. The patient is presenting today as a follow-up. Since his previous ED visit, he has been experiencing loss of vision. His most recent episode was 2 weeks ago and it occurred for a few hours. It happened in his left eye and feels like a shutter. Patient denies diplopia. Patient gets a headache once a year. Patient also inquired about knee pain. Starts on his left knee and it radiates down. Describes it as a constant pain. Stretches to relieve the pain. Pain occurs every 3-4 months. The patient is presenting as a follow-up today on 12/1/2022. The patient is having pain in his lower extremities, specifically the left knee area radiating down. The pain is intermittent. He states that he has pain more than half the time in a month. He states that his left has a tingling sensation.    Past Medical History:   Diagnosis Date    Cancer [facility]) 2019    prostate    Hematuria     Hypertension Intermittent abdominal pain     Nerve injury     left leg , states  s/p prostatectomy    DILAN (obstructive sleep apnea)     DOES NOT HAVE MACHINE    Prediabetes      Past Surgical History:   Procedure Laterality Date    APPENDECTOMY      CIRCUMCISION N/A 07/10/2020    CIRCUMCISION performed by Deja Bowman MD at [address] N/A 02/01/2021    COLONOSCOPY POLYPECTOMY HOT BIOPSY performed by Landon Rios MD at [address] [facility] N/A 02/07/2019    CYSTOSCOPY performed by Deja Bowman MD at [address] [facility] N/A 05/28/2020    CYSTOSCOPY performed by Deja Bowman MD at [address] [facility]  06/25/2021    XI ROBOTIC [address] WITH [address] [place] N/A 06/25/2021    XI ROBOTIC [place] performed by Tana Diaz IV, DO at [place] [address] Right 2004    NECK    PROSTATE BIOPSY N/A 03/21/2019    PROSTATE BIOPSY, ULTRASOUND  (OFFICE TO CONTACT [place] performed by Deja Bowman MD at [place] [address] N/A 05/31/2019    XI LAPAROSCOPIC ROBOTIC RADICAL PROSTATECTOMY, PELVIC LYMPH NODE DISSECTION performed by Deja Bowman MD at [address]  05/2022     No Known Allergies  Family History   Problem Relation Age of Onset    Other Mother         Kidney issues    High Blood Pressure Mother     No Known Problems Father     Lupus Sister     High Blood Pressure Sister     No Known Problems Sister     No Known Problems Sister     No Known Problems Sister     No Known Problems Brother     No Known Problems Brother     No Known Problems Brother     No Known Problems Brother     No Known Problems Maternal Grandmother     No Known Problems Maternal Grandfather     No Known Problems Paternal Grandmother     No Known Problems Paternal Grandfather       Social History     Socioeconomic History    Marital status: Single     Spouse name: Not on file    Number of children: Not on file    Years of education: Not on file    Highest education level: Not on file   Occupational History    Not on file   Tobacco Use    Smoking status: Never    Smokeless tobacco: Never   Vaping Use    Vaping Use: Never used   Substance and Sexual Activity    Alcohol use: No    Drug use: No    Sexual activity: Yes     Partners: Female     Birth control/protection: Condom   Other Topics Concern    Not on file   Social History Narrative    Not on file     Social Determinants of Health     Financial Resource Strain: Low Risk     Difficulty of Paying Living Expenses: Not hard at all   Food Insecurity: No Food Insecurity    Worried About Running Out of Food in the Last Year: Never true    Ran Out of Food in the Last Year: Never true   Transportation Needs: Not on file   Physical Activity: Not on file   Stress: Not on file   Social Connections: Not on file   Intimate Partner Violence: Not on file   Housing Stability: Not on file      Ht 5' 8\" (1.727 m)   Wt 245 lb (111.1 kg)   BMI 37.25 kg/m²       HEENT [] Hearing Loss  [x] Visual Disturbance  [] Tinnitus  [] Eye pain   Respiratory [] Shortness of Breath  [] Cough  [] Snoring   Cardiovascular [] Chest Pain  [] Palpitations  [] Lightheaded   GI [] Constipation  [] Diarrhea  [] Swallowing change  [] Nausea/vomiting    [] Urinary Frequency  [] Urinary Urgency   Musculoskeletal [] Neck pain  [] Back pain  [] Muscle pain  [] Restless legs   Dermatologic [] Skin changes   Neurologic [] Memory loss/confusion  [] Seizures  [] Trouble walking or imbalance  [] Dizziness  [] Sleep disturbance  [] Weakness  [] Numbness  [] Tremors  [] Speech Difficulty  [] Headaches  [] Light Sensitivity  [] Sound Sensitivity   Endocrinology []Excessive thirst  []Excessive hunger   Psychiatric [] Anxiety/Depression  [] Hallucination   Allergy/immunology []Hives/environmental allergies   Hematologic/lymph [] Abnormal bleeding  [] Abnormal bruising         General appearence: Normal. Well groomed.  In no acute distress    Head: Normocephalic, atraumatic  Eyes: Extraocular movements intact, eye lids normal  Lungs: Respirations unlabored, chest wall no deformity  ENT: Normal external ear canals, no sinus tenderness  Heart: Regular rate rhythm  Abdomen: No masses, tenderness  Extremities: No cyanosis or edema, 2+ pulses  Musculoskeletal: Normal range of motion in all joints  Skin: Intact, normal skin color    Neurological examination:    Mental status   Alert and oriented; intact memory with no confusion, speech or language problems; no hallucinations or delusions     Cranial nerves   II - visual fields intact to confrontation                                                III, IV, VI - extra-ocular muscles full: no pupillary defect; no KANDI, no nystagmus, no ptosis   V - normal facial sensation                                                               VII - normal facial symmetry                                                             VIII - intact hearing                                                                             IX, X - symmetrical palate                                                                  XI - symmetrical shoulder shrug                                                       XII - midline tongue without atrophy or fasciculation     Motor function  Normal muscle bulk and tone; normal power 5/5, including fine motor movements     Sensory function Intact to touch, pin, vibration, proprioception     Cerebellar Intact fine motor movement. No involuntary movements or tremors     Reflex function Intact 2+ DTR and symmetric.  Negative Babinski     Gait                  Normal station and gait       Lab Results   Component Value Date    LDLCHOLESTEROL 116 08/01/2022     No components found for: CHLPL  Lab Results   Component Value Date    TRIG 220 (H) 03/27/2022     Lab Results   Component Value Date    HDL 48 08/01/2022    HDL 53 03/27/2022    HDL 54 09/09/2021     No results found for: [address]  No results found for: LABVLDL  Lab Results   Component Value Date    LABA1C 6.0 11/30/2022     Lab Results   Component Value Date     11/30/2022     Lab Results   Component Value Date    [id number] 415 08/01/2022      Neurological work up:  CT head  CTA head and neck  MRI brain   2 D echo     All of patient's labs were personally reviewed. All the imaging studies were personally reviewed and discussed with the patient. Assessment Recommendations:  Neurological work up:  Study Date Finding   CT head       CTA Head and neck       MRI brain       2 D Echo       DARWIN       EMG 10/27/2022         Patient with transient left visual obscuration:  Since the last visit, the patient has not had any further episodes of visual obscuration. The patient has been seen by neuro-ophthalmology. Reports are not available to me but apparently no visible abnormality has been found. The patient has not had any recurrence of the symptoms. 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 He has positive Tinel's sign in the left knee. The patient underwent EMG nerve conduction study which showed absence of bilateral superficial sensory responses but did not show other evidence of neuropathy. Due to his ongoing clinical symptoms, positive Tinel's sign, I would obtain peroneal nerve MRI scan around the knee to assess for nerve compression and refer the patient to neurosurgery for consideration for possible surgical release     Follow up with me in 3 months       Melissa Caldwell, DO I would like to thank you for the consult. Please do not hesitate if you have any questions about the patient care.      Scribe Attestation:   By signing my name below, I, GELA RANGEL, attest that this documentation has been prepared under the direction and in the presence of Francois Montrell Loco MD.    Electronically Signed: Joleen Beltre. 12/01/22. 11:33 AM          This note is created with the assistance of a speech-recognition program. While intending to generate a document that actually reflects the content of the visit, the document can still have some errors including those of syntax and sound a- like substitutions which may escape proofreading. In such instances, actual meaning can be extrapolated by contextual derivation.

## 2022-12-01 NOTE — PROGRESS NOTES
Lab Results   Component Value Date    LDLCHOLESTEROL 116 08/01/2022     No components found for: CHLPL  Lab Results   Component Value Date    TRIG 220 (H) 03/27/2022     Lab Results   Component Value Date    HDL 48 08/01/2022    HDL 53 03/27/2022    HDL 54 09/09/2021     No results found for: LDLCALC  No results found for: LABVLDL  Lab Results   Component Value Date    LABA1C 6.0 11/30/2022     Lab Results   Component Value Date     11/30/2022     Lab Results   Component Value Date    [id number] 664 08/01/2022      Neurological work up:  Study Date Finding   CT head     CTA Head and neck     MRI brain     2 D Echo     DARWIN     EMG 10/27/2022      Assessment Recommendation:    Patient with transient left visual obscuration:  Since the last visit, the patient has not had any further episodes of visual obscuration. The patient has been seen by neuro-ophthalmology. Reports are not available to me but apparently no visible abnormality has been found. The patient has not had any recurrence of the symptoms. 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 He has positive Tinel's sign in the left knee. The patient underwent EMG nerve conduction study which showed absence of bilateral superficial sensory responses but did not show other evidence of neuropathy.   Due to his ongoing clinical symptoms, positive Tinel's sign, I would obtain peroneal nerve MRI scan around the knee to assess for nerve compression and refer the patient to neurosurgery for consideration for possible surgical release    Follow up with me in 3 months

## 2022-12-12 ENCOUNTER — TELEPHONE (OUTPATIENT)
Dept: DIABETES SERVICES | Age: 62
End: 2022-12-12

## 2022-12-12 ENCOUNTER — HOSPITAL ENCOUNTER (OUTPATIENT)
Dept: DIABETES SERVICES | Age: 62
Setting detail: THERAPIES SERIES
Discharge: HOME | End: 2022-12-12
Payer: MEDICAID

## 2022-12-12 PROCEDURE — G0108 DIAB MANAGE TRN  PER INDIV: HCPCS

## 2022-12-12 NOTE — TELEPHONE ENCOUNTER
Home Medications   Has eduction session today  10 am with writer and discussed medication list in EHR, and his hand written medication list.    Patient stated he is of one medication on list - he  then went home checked his medication bottles  at home, he called back to writer and confirmed   - he does not have any metformin at home and he stated he has been out for probably the last month. Please send in refill if you want him to re start metformin.     He also noted his A1C trend is down   Lab Results   Component Value Date    LABA1C 6.0 11/30/2022    LABA1C 7.4 (H) 08/01/2022    LABA1C 6.6 (H) 03/27/2022     Lab Results   Component Value Date    LABMICR 15 09/08/2021    CREATININE 1.03 08/01/2022      at time dizziness, may be concern for low BG - so if you do not want him to restart metformin, please let him know  He did confirm he is taking farxigia 10 mg daily and he also stopped taking medication for dizziness antivert     I did encourage patient to eat 3 meal per day and take snacks on his \"work days\"     Thanks  Uriel PEREZ
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
## 2022-12-16 ENCOUNTER — HOSPITAL ENCOUNTER (OUTPATIENT)
Dept: MRI IMAGING | Age: 62
End: 2022-12-16
Payer: MEDICAID

## 2022-12-16 DIAGNOSIS — G57.32 NEUROPATHY OF LEFT PERONEAL NERVE: ICD-10-CM

## 2022-12-16 LAB
EGFR, POC: >60 ML/MIN/1.73M2
POC CREATININE: 0.63 MG/DL (ref 0.51–1.19)

## 2022-12-16 PROCEDURE — A9579 GAD-BASE MR CONTRAST NOS,1ML: HCPCS | Performed by: PSYCHIATRY & NEUROLOGY

## 2022-12-16 PROCEDURE — 73720 MRI LWR EXTREMITY W/O&W/DYE: CPT

## 2022-12-16 PROCEDURE — 6360000004 HC RX CONTRAST MEDICATION: Performed by: PSYCHIATRY & NEUROLOGY

## 2022-12-16 PROCEDURE — 2580000003 HC RX 258: Performed by: PSYCHIATRY & NEUROLOGY

## 2022-12-16 PROCEDURE — 82565 ASSAY OF CREATININE: CPT

## 2022-12-16 RX ORDER — SODIUM CHLORIDE 0.9 % (FLUSH) 0.9 %
10 SYRINGE (ML) INJECTION PRN
Status: DISCONTINUED | OUTPATIENT
Start: 2022-12-16 | End: 2022-12-19 | Stop reason: HOSPADM

## 2022-12-16 RX ADMIN — GADOTERIDOL 15 ML: 279.3 INJECTION, SOLUTION INTRAVENOUS at 18:19

## 2022-12-16 RX ADMIN — SODIUM CHLORIDE, PRESERVATIVE FREE 10 ML: 5 INJECTION INTRAVENOUS at 18:20

## 2022-12-29 ENCOUNTER — OFFICE VISIT (OUTPATIENT)
Dept: NEUROSURGERY | Age: 62
End: 2022-12-29
Payer: MEDICAID

## 2022-12-29 VITALS
BODY MASS INDEX: 21.29 KG/M2 | HEART RATE: 68 BPM | RESPIRATION RATE: 18 BRPM | SYSTOLIC BLOOD PRESSURE: 121 MMHG | WEIGHT: 140 LBS | DIASTOLIC BLOOD PRESSURE: 74 MMHG | OXYGEN SATURATION: 98 % | TEMPERATURE: 98 F

## 2022-12-29 DIAGNOSIS — G57.32 LEFT PERONEAL MONONEUROPATHY: Primary | ICD-10-CM

## 2022-12-29 PROCEDURE — 3017F COLORECTAL CA SCREEN DOC REV: CPT | Performed by: NURSE PRACTITIONER

## 2022-12-29 PROCEDURE — 3078F DIAST BP <80 MM HG: CPT | Performed by: NURSE PRACTITIONER

## 2022-12-29 PROCEDURE — G8484 FLU IMMUNIZE NO ADMIN: HCPCS | Performed by: NURSE PRACTITIONER

## 2022-12-29 PROCEDURE — G8420 CALC BMI NORM PARAMETERS: HCPCS | Performed by: NURSE PRACTITIONER

## 2022-12-29 PROCEDURE — G8427 DOCREV CUR MEDS BY ELIG CLIN: HCPCS | Performed by: NURSE PRACTITIONER

## 2022-12-29 PROCEDURE — 1036F TOBACCO NON-USER: CPT | Performed by: NURSE PRACTITIONER

## 2022-12-29 PROCEDURE — 99203 OFFICE O/P NEW LOW 30 MIN: CPT | Performed by: NURSE PRACTITIONER

## 2022-12-29 PROCEDURE — 3074F SYST BP LT 130 MM HG: CPT | Performed by: NURSE PRACTITIONER

## 2022-12-29 NOTE — PROGRESS NOTES
[address]  [facility] [address], [address] [address]  Dept: [phone]    Patient:  Sergio Krishna  YOB: 1960  Date: 12/29/22    The patient is a 63 y.o. male who presents today for consult of the following problems:     Chief Complaint   Patient presents with    New Patient    Leg Pain     Left leg pain         HPI:     Sergio Krishna is a 63 y.o. male on whom neurosurgical consultation was requested by Khloe Oneal DO for management of left leg pain that has been present since around 2019 following prostate surgery. Woke up with pain to left lateral knee, started to radiate down shin and into left foot. Pain gradually improved, but residual tingling and weakness persists. Will now have intermittent episodes of pain to lateral knee, lasting a couple of days then gradually decreases. Uses massage, lotion. They occur 2-3 times monthly, they have been slightly decreasing in frequency. Overall does feel improved since initial onset, though symptoms are still occurring and quite bothersome when they do.   Had single physical therapy visit recently with focus on lower extremities    History:     Past Medical History:   Diagnosis Date    Cancer ([address] 2019    prostate    Hematuria     Hypertension     Intermittent abdominal pain     Nerve injury     left leg , states  s/p prostatectomy    DILAN (obstructive sleep apnea)     DOES NOT HAVE MACHINE    Prediabetes      Past Surgical History:   Procedure Laterality Date    APPENDECTOMY      CIRCUMCISION N/A 07/10/2020    CIRCUMCISION performed by Donal Quiñones MD at [place] [address] N/A 02/01/2021    COLONOSCOPY POLYPECTOMY HOT BIOPSY performed by Nikko Sanders MD at [facility] N/A 02/07/2019    CYSTOSCOPY performed by Donal Quiñones MD at [facility] N/A 05/28/2020    CYSTOSCOPY performed by Donal Quiñones MD at [facility] OR    HERNIA REPAIR  06/25/2021    XI ROBOTIC LAPAROSCOPIC HERNIA VENTRAL REPAIR WITH MESH    HERNIA REPAIR N/A 06/25/2021    XI ROBOTIC LAPAROSCOPIC HERNIA VENTRAL REPAIR WITH MESH performed by Jalen Diaz IV, DO at [facility] 77 Right 2004    NECK    PROSTATE BIOPSY N/A 03/21/2019    PROSTATE BIOPSY, ULTRASOUND  (OFFICE TO CONTACT [place] performed by Fred López MD at [place] [address] N/A 05/31/2019    XI LAPAROSCOPIC ROBOTIC RADICAL PROSTATECTOMY, PELVIC LYMPH NODE DISSECTION performed by Fred López MD at [address]  05/2022     Family History   Problem Relation Age of Onset    Other Mother         Kidney issues    High Blood Pressure Mother     No Known Problems Father     Lupus Sister     High Blood Pressure Sister     No Known Problems Sister     No Known Problems Sister     No Known Problems Sister     No Known Problems Brother     No Known Problems Brother     No Known Problems Brother     No Known Problems Brother     No Known Problems Maternal Grandmother     No Known Problems Maternal Grandfather     No Known Problems Paternal Grandmother     No Known Problems Paternal Grandfather      Current Outpatient Medications on File Prior to Visit   Medication Sig Dispense Refill    atorvastatin (LIPITOR) 40 MG tablet Take 1 tablet by mouth nightly 90 tablet 3    metFORMIN (GLUCOPHAGE) 500 MG tablet       olmesartan-hydroCHLOROthiazide (BENICAR HCT) 40-25 MG per tablet Take 1 tablet by mouth daily 30 tablet 3    tamsulosin (FLOMAX) 0.4 MG capsule Take 1 capsule by mouth daily 30 capsule 3    dapagliflozin (FARXIGA) 10 MG tablet Take 1 tablet by mouth every morning 30 tablet 5    blood glucose monitor strips Test 2 times a day & as needed for symptoms of irregular blood glucose. Dispense sufficient amount for indicated testing frequency plus additional to accommodate PRN testing needs.  100 strip 5    glucose monitoring (FREESTYLE FREEDOM) kit 1 kit by Does not apply route daily 1 kit 0    Lancets MISC 1 each by Does not apply route 2 times daily 100 each 5    Ascorbic Acid (VITAMIN C PO) Take by mouth      meclizine (ANTIVERT) 25 MG CHEW CHEW AND SWALLOW 1 TABLET BY MOUTH THREE TIMES DAILY AS NEEDED (Patient not taking: No sig reported)      meloxicam (MOBIC) 7.5 MG tablet Take 7.5 mg by mouth in the morning.      sodium chloride (OCEAN) 0.65 % nasal spray 2 sprays by Nasal route 4 times daily as needed (Patient not taking: No sig reported)      magnesium 200 MG TABS tablet Take 1 tablet by mouth in the morning. (Patient not taking: No sig reported) 90 tablet 2    aspirin 81 MG chewable tablet Take 1 tablet by mouth daily (Patient not taking: No sig reported) 30 tablet 0    nitroGLYCERIN (NITROSTAT) 0.4 MG SL tablet up to max of 3 total doses. If no relief after 1 dose, call 911. (Patient not taking: No sig reported) 25 tablet 0    docusate sodium (COLACE) 100 MG capsule Take 1 capsule by mouth daily as needed for Constipation (Patient not taking: No sig reported) 30 capsule 1    vitamin D (ERGOCALCIFEROL) 1.25 MG (14557 UT) CAPS capsule TK ONE CAPSULE PO ONCE A WEEK (Patient not taking: No sig reported)      Multiple Vitamins-Minerals (THERAPEUTIC MULTIVITAMIN-MINERALS) tablet Take 1 tablet by mouth daily       No current facility-administered medications on file prior to visit. Social History     Tobacco Use    Smoking status: Never    Smokeless tobacco: Never   Vaping Use    Vaping Use: Never used   Substance Use Topics    Alcohol use: No    Drug use: No       No Known Allergies    Review of Systems  Constitutional: Negative for activity change and appetite change. HENT: Negative for ear pain and facial swelling. Eyes: Negative for discharge and itching. Respiratory: Negative for choking and chest tightness. Cardiovascular: Negative for chest pain and leg swelling. Gastrointestinal: Negative for nausea and abdominal pain.    Endocrine: Negative for cold intolerance and heat intolerance. Genitourinary: Negative for frequency and flank pain. Musculoskeletal: Negative for myalgias and joint swelling. Skin: Negative for rash and wound. Allergic/Immunologic: Negative for environmental allergies and food allergies. Hematological: Negative for adenopathy. Does not bruise/bleed easily. Psychiatric/Behavioral: Negative for self-injury. The patient is not nervous/anxious. Physical Exam:      /74 (Site: Right Upper Arm, Position: Sitting, Cuff Size: Large Adult)   Pulse 68   Temp 98 °F (36.7 °C)   Resp 18   Wt 140 lb (63.5 kg)   SpO2 98%   BMI 21.29 kg/m²   Estimated body mass index is 21.29 kg/m² as calculated from the following:    Height as of 12/16/22: 5' 8\" (1.727 m). Weight as of this encounter: 140 lb (63.5 kg). General:  Nubia Fleming is a 63y.o. year old male who appears his stated age. HEENT: Normocephalic atraumatic. Neck supple. Chest: regular rate; pulses equal  Abdomen: Soft nontender nondistended.   Ext: DP and PT pulses 2+, good cap refill  Neuro    Mentation  Appropriate affect  Registration intact  Orientation intact  Judgement intact to situation    Cranial Nerves:   Pupils equal and reactive to light  Extraocular motion intact  Face and shrug symmetric  Tongue midline  No dysarthria  v1-3 sensation symmetric, masseter tone symmetric  Hearing symmetric    Sensation: Intact    Motor  L deltoid 5/5; R deltoid 5/5  L biceps 5/5; R biceps 5/5  L triceps 5/5; R triceps 5/5  L wrist extension 5/5; R wrist extension 5/5  L intrinsics 5/5; R intrinsics 5/5     L iliopsoas 5/5 , R iliopsoas 5/5  L quadriceps 5/5; R quadriceps 5/5  L Dorsiflexion 5/5; R dorsiflexion 5/5  L Plantarflexion 5/5; R plantarflexion 5/5  L EHL 5/5; R EHL 5/5    Reflexes  L Brachioradialis 2+/4; R brachioradialis 2+/4  L Biceps 2+/4; R Biceps 2+/4  L Triceps 2+/4; R Triceps 2+/4  L Patellar 2+/4: R Patellar 2+/4  L Achilles 2+/4; R Achilles 2+/4    hoffmans L: neg  hoffmans R: neg  Clonus L: neg  Clonus R: neg  Babinski L: neg  Babinski R: neg    Studies Review:     EMG 10/27/2022:  MRI tibia fibula 12/16/2022:  FINDINGS:   There is poor fat saturation at the level of the knee/fibular head, limiting   assessment. Within this limitation, the visualized portions of the tibial   nerve, common peroneal nerve, and superficial/deep peroneal nerves   demonstrate no obvious abnormality. There is a small focal area of atrophy   and fatty replacement of the soleus muscle. Otherwise, there is no   significant muscle atrophy or edema in the left lower leg. No acute fracture or significant bone marrow edema. No suspicious marrow   replacing osseous lesion is seen. The visualized tendons demonstrate no acute abnormality. Small Baker's cyst.     Neurology notes reviewed    Assessment and Plan:      1. Left peroneal mononeuropathy          Plan: Patient with residual sensory alteration and intermittent episodes of pain to distal left lower extremity. Symptoms have been gradually improving over time. Recommend initiation of physical therapy to work on lower extremity stretching, strengthening, range of motion. We will also provide referral for pain management for consideration of interventions. We will see the patient back in 10 to 12 weeks for reevaluation if symptoms persist.    Followup: Return in about 12 weeks (around 3/23/2023), or if symptoms worsen or fail to improve. Prescriptions Ordered:  No orders of the defined types were placed in this encounter.      Orders Placed:  Orders Placed This Encounter   Procedures    [address]     Referral Priority:   Routine     Referral Type:   Eval and Treat     Referral Reason:   Specialty Services Required     Requested Specialty:   Physical Therapist     Number of Visits Requested:   [address] [place] [address] DO Becca, Pain Management, Ilan Lara     Referral Priority:   Routine     Referral Type:   Eval and Treat     Referral Reason:   Specialty Services Required     Referred to Provider:   Nish Kyle DO     Number of Visits Requested:   1        Electronically signed by CARMELA Baxter CNP on 12/29/2022 at 12:43 PM    Please note that this chart was generated using voice recognition Dragon dictation software. Although every effort was made to ensure the accuracy of this automated transcription, some errors in transcription may have occurred.

## 2023-01-12 RX ORDER — TAMSULOSIN HYDROCHLORIDE 0.4 MG/1
0.4 CAPSULE ORAL DAILY
Qty: 30 CAPSULE | Refills: 3 | Status: SHIPPED | OUTPATIENT
Start: 2023-01-12

## 2023-01-12 RX ORDER — OLMESARTAN MEDOXOMIL AND HYDROCHLOROTHIAZIDE 40/25 40; 25 MG/1; MG/1
1 TABLET ORAL DAILY
Qty: 30 TABLET | Refills: 3 | Status: SHIPPED | OUTPATIENT
Start: 2023-01-12

## 2023-01-12 NOTE — TELEPHONE ENCOUNTER
Matthew Talley is calling to request a refill on the following medication(s):    Last Visit Date (If Applicable):  62/03/8926    Next Visit Date:    Visit date not found    Medication Request:  Requested Prescriptions     Pending Prescriptions Disp Refills    olmesartan-hydroCHLOROthiazide (BENICAR HCT) 40-25 MG per tablet [Pharmacy Med Name: OLMESARTAN MEDOX/HCTZ 40-25MG TAB] 30 tablet 3     Sig: TAKE 1 TABLET BY MOUTH DAILY    tamsulosin (FLOMAX) 0.4 MG capsule [Pharmacy Med Name: TAMSULOSIN 0.4MG CAPSULES] 30 capsule 3     Sig: TAKE 1 CAPSULE BY MOUTH DAILY

## 2023-02-09 DIAGNOSIS — E11.69 TYPE 2 DIABETES MELLITUS WITH OTHER SPECIFIED COMPLICATION, WITHOUT LONG-TERM CURRENT USE OF INSULIN (HCC): ICD-10-CM

## 2023-02-09 DIAGNOSIS — E78.2 MIXED HYPERLIPIDEMIA: ICD-10-CM

## 2023-02-09 RX ORDER — TAMSULOSIN HYDROCHLORIDE 0.4 MG/1
0.4 CAPSULE ORAL DAILY
Qty: 30 CAPSULE | Refills: 3 | Status: SHIPPED | OUTPATIENT
Start: 2023-02-09

## 2023-02-09 RX ORDER — GLUCOSAMINE HCL/CHONDROITIN SU 500-400 MG
CAPSULE ORAL
Qty: 100 STRIP | Refills: 5 | Status: SHIPPED | OUTPATIENT
Start: 2023-02-09

## 2023-02-09 RX ORDER — ATORVASTATIN CALCIUM 40 MG/1
40 TABLET, FILM COATED ORAL NIGHTLY
Qty: 90 TABLET | Refills: 3 | Status: SHIPPED | OUTPATIENT
Start: 2023-02-09

## 2023-02-09 RX ORDER — OLMESARTAN MEDOXOMIL AND HYDROCHLOROTHIAZIDE 40/25 40; 25 MG/1; MG/1
1 TABLET ORAL DAILY
Qty: 30 TABLET | Refills: 3 | Status: SHIPPED | OUTPATIENT
Start: 2023-02-09

## 2023-02-09 NOTE — TELEPHONE ENCOUNTER
Helen Smith is calling to request a refill on the following medication(s):    Last Visit Date (If Applicable):  40/75/5712    Next Visit Date:    2/13/2023    Medication Request:  Requested Prescriptions     Pending Prescriptions Disp Refills    tamsulosin (FLOMAX) 0.4 MG capsule 30 capsule 3     Sig: Take 1 capsule by mouth daily    dapagliflozin (FARXIGA) 10 MG tablet 30 tablet 5     Sig: Take 1 tablet by mouth every morning    olmesartan-hydroCHLOROthiazide (BENICAR HCT) 40-25 MG per tablet 30 tablet 3     Sig: Take 1 tablet by mouth daily    atorvastatin (LIPITOR) 40 MG tablet 90 tablet 3     Sig: Take 1 tablet by mouth nightly    blood glucose monitor strips 100 strip 5     Sig: Test 2 times a day & as needed for symptoms of irregular blood glucose. Dispense sufficient amount for indicated testing frequency plus additional to accommodate PRN testing needs.

## 2023-02-13 ENCOUNTER — OFFICE VISIT (OUTPATIENT)
Dept: NEUROLOGY | Age: 63
End: 2023-02-13
Payer: MEDICAID

## 2023-02-13 ENCOUNTER — TELEPHONE (OUTPATIENT)
Dept: FAMILY MEDICINE CLINIC | Age: 63
End: 2023-02-13

## 2023-02-13 ENCOUNTER — OFFICE VISIT (OUTPATIENT)
Dept: FAMILY MEDICINE CLINIC | Age: 63
End: 2023-02-13

## 2023-02-13 VITALS
HEART RATE: 63 BPM | BODY MASS INDEX: 36.22 KG/M2 | HEIGHT: 68 IN | OXYGEN SATURATION: 97 % | DIASTOLIC BLOOD PRESSURE: 78 MMHG | WEIGHT: 239 LBS | SYSTOLIC BLOOD PRESSURE: 135 MMHG

## 2023-02-13 VITALS
BODY MASS INDEX: 35.61 KG/M2 | HEIGHT: 68 IN | SYSTOLIC BLOOD PRESSURE: 130 MMHG | HEART RATE: 64 BPM | DIASTOLIC BLOOD PRESSURE: 71 MMHG | WEIGHT: 235 LBS

## 2023-02-13 DIAGNOSIS — R42 DIZZINESS: Primary | ICD-10-CM

## 2023-02-13 DIAGNOSIS — E78.2 MIXED HYPERLIPIDEMIA: ICD-10-CM

## 2023-02-13 DIAGNOSIS — E11.69 TYPE 2 DIABETES MELLITUS WITH OTHER SPECIFIED COMPLICATION, WITHOUT LONG-TERM CURRENT USE OF INSULIN (HCC): Primary | ICD-10-CM

## 2023-02-13 DIAGNOSIS — I10 PRIMARY HYPERTENSION: ICD-10-CM

## 2023-02-13 DIAGNOSIS — R42 DIZZINESS: ICD-10-CM

## 2023-02-13 LAB — HBA1C MFR BLD: 6.1 %

## 2023-02-13 PROCEDURE — G8417 CALC BMI ABV UP PARAM F/U: HCPCS | Performed by: PSYCHIATRY & NEUROLOGY

## 2023-02-13 PROCEDURE — 3075F SYST BP GE 130 - 139MM HG: CPT | Performed by: PSYCHIATRY & NEUROLOGY

## 2023-02-13 PROCEDURE — 3017F COLORECTAL CA SCREEN DOC REV: CPT | Performed by: PSYCHIATRY & NEUROLOGY

## 2023-02-13 PROCEDURE — 1036F TOBACCO NON-USER: CPT | Performed by: PSYCHIATRY & NEUROLOGY

## 2023-02-13 PROCEDURE — G8427 DOCREV CUR MEDS BY ELIG CLIN: HCPCS | Performed by: PSYCHIATRY & NEUROLOGY

## 2023-02-13 PROCEDURE — 99214 OFFICE O/P EST MOD 30 MIN: CPT | Performed by: PSYCHIATRY & NEUROLOGY

## 2023-02-13 PROCEDURE — 3078F DIAST BP <80 MM HG: CPT | Performed by: PSYCHIATRY & NEUROLOGY

## 2023-02-13 PROCEDURE — G8484 FLU IMMUNIZE NO ADMIN: HCPCS | Performed by: PSYCHIATRY & NEUROLOGY

## 2023-02-13 RX ORDER — OLMESARTAN MEDOXOMIL AND HYDROCHLOROTHIAZIDE 20/12.5 20; 12.5 MG/1; MG/1
1 TABLET ORAL DAILY
Qty: 90 TABLET | Refills: 1 | Status: SHIPPED | OUTPATIENT
Start: 2023-02-13

## 2023-02-13 SDOH — ECONOMIC STABILITY: HOUSING INSECURITY
IN THE LAST 12 MONTHS, WAS THERE A TIME WHEN YOU DID NOT HAVE A STEADY PLACE TO SLEEP OR SLEPT IN A SHELTER (INCLUDING NOW)?: NO

## 2023-02-13 SDOH — ECONOMIC STABILITY: FOOD INSECURITY: WITHIN THE PAST 12 MONTHS, THE FOOD YOU BOUGHT JUST DIDN'T LAST AND YOU DIDN'T HAVE MONEY TO GET MORE.: NEVER TRUE

## 2023-02-13 SDOH — ECONOMIC STABILITY: INCOME INSECURITY: HOW HARD IS IT FOR YOU TO PAY FOR THE VERY BASICS LIKE FOOD, HOUSING, MEDICAL CARE, AND HEATING?: NOT VERY HARD

## 2023-02-13 SDOH — ECONOMIC STABILITY: FOOD INSECURITY: WITHIN THE PAST 12 MONTHS, YOU WORRIED THAT YOUR FOOD WOULD RUN OUT BEFORE YOU GOT MONEY TO BUY MORE.: NEVER TRUE

## 2023-02-13 ASSESSMENT — PATIENT HEALTH QUESTIONNAIRE - PHQ9
1. LITTLE INTEREST OR PLEASURE IN DOING THINGS: 0
SUM OF ALL RESPONSES TO PHQ QUESTIONS 1-9: 0
2. FEELING DOWN, DEPRESSED OR HOPELESS: 0
SUM OF ALL RESPONSES TO PHQ9 QUESTIONS 1 & 2: 0
SUM OF ALL RESPONSES TO PHQ QUESTIONS 1-9: 0

## 2023-02-13 NOTE — TELEPHONE ENCOUNTER
----- Message from [facility] sent at 2/13/2023  9:52 AM EST -----  Subject: Referral Request    Reason for referral request? Ear, nose, & Throat problems  Provider patient wants to be referred to(if known):     Provider Phone Number(if known): Additional Information for Provider? Patient received a referral for a   Ear, nose & throat doctor but the office is not accepting new patient at   this time.  Patient would like another referral.  ---------------------------------------------------------------------------  --------------  [address]    [phone]; OK to leave message on voicemail  ---------------------------------------------------------------------------  --------------

## 2023-02-13 NOTE — PROGRESS NOTES
[facility]  [address] [place] [address] Dr YANEZ [address] [address]  Dept: [phone]      Rekha Gill is a 63 y.o. male who presents today for follow up on his  medical conditions as noted below.       Chief Complaint   Patient presents with    Diabetes    Dizziness    Hypertension       Patient Active Problem List:     Plantar wart     Dysuria     Prostate cancer ([address]     Chest pain     Blurry vision, left eye     Weakness of left lower extremity     Diabetes mellitus ([address]     Primary hypertension     Mixed hyperlipidemia     Past Medical History:   Diagnosis Date    Cancer ([address] 2019    prostate    Hematuria     Hypertension     Intermittent abdominal pain     Nerve injury     left leg , states  s/p prostatectomy    DILAN (obstructive sleep apnea)     DOES NOT HAVE MACHINE    Prediabetes       Past Surgical History:   Procedure Laterality Date    APPENDECTOMY      CIRCUMCISION N/A 07/10/2020    CIRCUMCISION performed by Zack Frazier MD at [address] [facility] N/A 02/01/2021    COLONOSCOPY POLYPECTOMY HOT BIOPSY performed by Efren Mcclain MD at [facility] N/A 02/07/2019    CYSTOSCOPY performed by Zack Frazier MD at [facility] N/A 05/28/2020    CYSTOSCOPY performed by Zack Frazier MD at [facility]  06/25/2021    XI ROBOTIC [facility] [facility] N/A 06/25/2021    XI ROBOTIC [place] performed by Erlin Diaz IV, DO at [place] [address] Right 2004    NECK    PROSTATE BIOPSY N/A 03/21/2019    PROSTATE BIOPSY, ULTRASOUND  (OFFICE TO CONTACT [place] performed by Zack Frazier MD at [address] [facility] 05/31/2019    XI LAPAROSCOPIC ROBOTIC RADICAL PROSTATECTOMY, PELVIC LYMPH NODE DISSECTION performed by Zack Frazier MD at [address]  05/2022     Family History   Problem Relation Age of Onset Other Mother         Kidney issues    High Blood Pressure Mother     No Known Problems Father     Lupus Sister     High Blood Pressure Sister     No Known Problems Sister     No Known Problems Sister     No Known Problems Sister     No Known Problems Brother     No Known Problems Brother     No Known Problems Brother     No Known Problems Brother     No Known Problems Maternal Grandmother     No Known Problems Maternal Grandfather     No Known Problems Paternal Grandmother     No Known Problems Paternal Grandfather        Current Outpatient Medications   Medication Sig Dispense Refill    olmesartan-hydroCHLOROthiazide (BENICAR HCT) 20-12.5 MG per tablet Take 1 tablet by mouth daily 90 tablet 1    tamsulosin (FLOMAX) 0.4 MG capsule Take 1 capsule by mouth daily 30 capsule 3    dapagliflozin (FARXIGA) 10 MG tablet Take 1 tablet by mouth every morning 30 tablet 5    atorvastatin (LIPITOR) 40 MG tablet Take 1 tablet by mouth nightly 90 tablet 3    blood glucose monitor strips Test 2 times a day & as needed for symptoms of irregular blood glucose. Dispense sufficient amount for indicated testing frequency plus additional to accommodate PRN testing needs. 100 strip 5    metFORMIN (GLUCOPHAGE) 500 MG tablet       glucose monitoring (FREESTYLE FREEDOM) kit 1 kit by Does not apply route daily 1 kit 0    Lancets MISC 1 each by Does not apply route 2 times daily 100 each 5    meloxicam (MOBIC) 7.5 MG tablet Take 7.5 mg by mouth in the morning.       olmesartan-hydroCHLOROthiazide (BENICAR HCT) 40-25 MG per tablet Take 1 tablet by mouth daily (Patient not taking: Reported on 2/13/2023) 30 tablet 3    Ascorbic Acid (VITAMIN C PO) Take by mouth (Patient not taking: Reported on 2/13/2023)      meclizine (ANTIVERT) 25 MG CHEW CHEW AND SWALLOW 1 TABLET BY MOUTH THREE TIMES DAILY AS NEEDED (Patient not taking: No sig reported)      sodium chloride (OCEAN) 0.65 % nasal spray 2 sprays by Nasal route 4 times daily as needed (Patient not taking: No sig reported)      magnesium 200 MG TABS tablet Take 1 tablet by mouth in the morning. (Patient not taking: No sig reported) 90 tablet 2    aspirin 81 MG chewable tablet Take 1 tablet by mouth daily (Patient not taking: No sig reported) 30 tablet 0    nitroGLYCERIN (NITROSTAT) 0.4 MG SL tablet up to max of 3 total doses. If no relief after 1 dose, call 911. (Patient not taking: No sig reported) 25 tablet 0    docusate sodium (COLACE) 100 MG capsule Take 1 capsule by mouth daily as needed for Constipation (Patient not taking: No sig reported) 30 capsule 1    vitamin D (ERGOCALCIFEROL) 1.25 MG (83234 UT) CAPS capsule TK ONE CAPSULE PO ONCE A WEEK (Patient not taking: No sig reported)      Multiple Vitamins-Minerals (THERAPEUTIC MULTIVITAMIN-MINERALS) tablet Take 1 tablet by mouth daily (Patient not taking: Reported on 2/13/2023)       No current facility-administered medications for this visit. ALLERGIES:  No Known Allergies    Social History     Tobacco Use    Smoking status: Never    Smokeless tobacco: Never   Substance Use Topics    Alcohol use: No        LDL Cholesterol (mg/dL)   Date Value   08/01/2022 116     HDL (mg/dL)   Date Value   08/01/2022 48     BUN (mg/dL)   Date Value   08/01/2022 18     Creatinine (mg/dL)   Date Value   08/01/2022 1.03     POC Creatinine (mg/dL)   Date Value   12/16/2022 0.63     Glucose (mg/dL)   Date Value   08/01/2022 179 (H)     Hemoglobin A1C (%)   Date Value   02/13/2023 6.1     Microalb/Crt.  Ratio (mcg/mg creat)   Date Value   09/08/2021 15              Subjective:      HPI  Is here today for follow-up on diabetes he is doing much better his A1c is 6.1 his blood pressure is controlled today although he states he was feeling lightheaded so he himself started breaking his 40/12.5 mg Benicar is in half that is what he took this morning  He also states he has had ongoing issues with dizziness for many years worse if he bends over or has other head movement but it seems as though it is getting worse for him  He wonders if she should continue on cholesterol meds  And he is going to try to work on weight loss    Review of Systems:     Constitutional: Negative for fever, appetite change and fatigue. Family social and medical history reviewed and unchanged     HENT: Negative. Negative for nosebleeds, trouble swallowing and neck pain. Eyes: Negative for photophobia and visual disturbance. Respiratory: Negative. Negative for chest tightness and shortness of breath. Cardiovascular: Negative. Negative for chest pain and leg swelling. Gastrointestinal: Negative. Negative for abdominal pain and blood in stool. Endocrine: Negative for cold intolerance and polyuria. Genitourinary: Negative for dysuria and hematuria. Musculoskeletal: Negative. Skin: Negative for rash. Allergic/Immunologic: Negative. Neurological: Negative. Negative for dizziness, weakness and numbness. Hematological: Negative. Negative for adenopathy. Does not bruise/bleed easily. Psychiatric/Behavioral: Negative for sleep disturbance, dysphoric mood and  decreased concentration. The patient is not nervous/anxious. Objective:     Physical Exam:     Nursing note and vitals reviewed. /78   Pulse 63   Ht 5' 8\" (1.727 m)   Wt 239 lb (108.4 kg)   SpO2 97%   BMI 36.34 kg/m²   Constitutional: He is oriented to person, place, and time. He   appears well-developed and well-nourished. HENT:   Head: Normocephalic and atraumatic. Right Ear: External ear normal. Tympanic membrane is not erythematous. No middle ear effusion. Left Ear: External ear normal. Tympanic membrane is not erythematous. No middle ear effusion. Nose: No mucosal edema. Mouth/Throat: Oropharynx is clear and moist. No posterior oropharyngeal erythema. Eyes: Conjunctivae and EOM are normal. Pupils are equal, round, and reactive to light. Neck: Normal range of motion. Neck supple.  No thyromegaly present. Cardiovascular: Normal rate, regular rhythm and normal heart sounds. No murmur heard. Pulmonary/Chest: Effort normal and breath sounds normal. He has no wheezes. Hehas no rales. Abdominal: Soft. Bowel sounds are normal. He exhibits no distension and no mass. There is no tenderness. There is no rebound and no guarding. Genitourinary/Anorectal:deferred  Musculoskeletal: Normal range of motion. He exhibits no edema or tenderness. Lymphadenopathy: He has no cervical adenopathy. Neurological: He is alert and oriented to person, place, and time. He has normal reflexes. Skin: Skin is warm and dry. No rash noted. Psychiatric: He has a normal mood and affect. His   behavior is normal.       Assessment:      1. Type 2 diabetes mellitus with other specified complication, without long-term current use of insulin ([facility]    2. Primary hypertension    3. Dizziness    4. Mixed hyperlipidemia          Plan:      Call or return to clinic prn if these symptoms worsen or fail to improve as anticipated. I have reviewed the instructions with the patient, answering all questions to his satisfaction. Return in about 6 months (around 8/13/2023).   Orders Placed This Encounter   Procedures    RONALD - Homa Armenta MD, Otolaryngology, [place]     Referral Priority:   Routine     Referral Type:   Eval and Treat     Referral Reason:   Specialty Services Required     Referred to Provider:   Lorie Dent MD     Requested Specialty:   Otolaryngology     Number of Visits Requested:   1    POCT glycosylated hemoglobin (Hb A1C)     Orders Placed This Encounter   Medications    olmesartan-hydroCHLOROthiazide (BENICAR HCT) 20-12.5 MG per tablet     Sig: Take 1 tablet by mouth daily     Dispense:  90 tablet     Refill:  1     I told patient he should not be breaking his blood pressure med because it is a sustained release so we will just decrease the dose  Follow-up with ENT in regards to the dizziness continue on all other meds and work on weight loss and follow-up in 6 months or as needed  Electronically signed by Darryle Goo, DO on 2/13/2023 at 9:11 AM

## 2023-02-13 NOTE — TELEPHONE ENCOUNTER
New referral faxed to [facility]. I tired to call patient to update no answer LVM to contact office.

## 2023-02-13 NOTE — PROGRESS NOTES
[facility], [address], [address] [place] [address]  Ph: [phone] or [phone]  FAX: [phone]    Chief Complaint: Vision loss, knee pain     Dear Claudine Zambrano, DO     I had the pleasure of seeing your patient today in neurology consultation for his symptoms. As you would recall Perla Faria is a 63 y.o. male. He was admitted on 3/26/2022 with concern of chest pain and transient vision loss. Patient is borderline diabetic and has history of hypertension and sleep apnea. As per note, he was driving when he had sudden onset of blurry vision in his left eye which lasted for 15 to 20 minutes before he developed diaphoresis and chest pain accompanied by nausea and vomiting. The vomiting continued and patient pulled over and called his daughter to take him to the hospital. The patient has had intermittent episodes of blurred vision for 5 years that was occurring 1-2 times per week. No family history of strokes or seizure or MS. patient is not a smoker and does not utilize alcohol. Previously, the patient is presenting as a follow-up today on 12/1/2022. The patient is having pain in his lower extremities, specifically the left knee area radiating down. The pain is intermittent. He states that he has pain more than half the time in a month. He states that his left has a tingling sensation. Today, 2/13/23, the patient presents for a follow up visit. The patient states his left knee pain, rated as a 10/10, is intermittent and unchanged since the last visit. He notes the left knee pain radiates only to the back of his left calf. He admits a tingling sensation at the top of his left foot which he believes is related to the left knee pain. He does note that stretching helps to relieve his left knee pain. Next, the patient reports that he had one episode of dizziness with loss of vision in both eyes about 2-3 weeks ago which lasted for 10-15 minutes.  He states the episode was triggered after moving from a bending to upright position. Finally, he reports that his DM2 is controlled. Neurological workup:  MRI Tibia Fibula Left W WO Contrast 12/19/22: Within the limitations of poor fat saturation at the level of the knee/fibular head, there is no obvious abnormality identified involving the common peroneal nerve. Small focal area of atrophy and fatty replacement is seen involving the soleus muscle at the level of the proximal tibial diaphysis. Otherwise, there is no significant muscle atrophy or edema in the lower leg.  Small Baker's cyst.    Past Medical History:   Diagnosis Date    Cancer ([address] 2019    prostate    Hematuria     Hypertension     Intermittent abdominal pain     Nerve injury     left leg , states  s/p prostatectomy    DILAN (obstructive sleep apnea)     DOES NOT HAVE MACHINE    Prediabetes      Past Surgical History:   Procedure Laterality Date    APPENDECTOMY      CIRCUMCISION N/A 07/10/2020    CIRCUMCISION performed by Marisol Villagomez MD at [address] [facility] N/A 02/01/2021    COLONOSCOPY POLYPECTOMY HOT BIOPSY performed by Irma Kidd MD at [facility] N/A 02/07/2019    CYSTOSCOPY performed by Marisol Villagomez MD at [facility] N/A 05/28/2020    CYSTOSCOPY performed by Marisol Villagomez MD at [facility]  06/25/2021    XI ROBOTIC [facility] N/A 06/25/2021    XI ROBOTIC [facility] performed by Maddy Diaz IV, DO at [facility] Right 2004    NECK    PROSTATE BIOPSY N/A 03/21/2019    PROSTATE BIOPSY, ULTRASOUND  (OFFICE TO CONTACT [place] performed by Marisol Villagomez MD at [facility] N/A 05/31/2019    XI LAPAROSCOPIC ROBOTIC RADICAL PROSTATECTOMY, PELVIC LYMPH NODE DISSECTION performed by Marisol Villagomez MD at [address]  05/2022     No Known Allergies  Family History   Problem Relation Age of Onset    Other Mother         Kidney issues    High Blood Pressure Mother     No Known Problems Father     Lupus Sister     High Blood Pressure Sister     No Known Problems Sister     No Known Problems Sister     No Known Problems Sister     No Known Problems Brother     No Known Problems Brother     No Known Problems Brother     No Known Problems Brother     No Known Problems Maternal Grandmother     No Known Problems Maternal Grandfather     No Known Problems Paternal Grandmother     No Known Problems Paternal Grandfather       Social History     Socioeconomic History    Marital status: Single     Spouse name: Not on file    Number of children: Not on file    Years of education: Not on file    Highest education level: Not on file   Occupational History    Not on file   Tobacco Use    Smoking status: Never    Smokeless tobacco: Never   Vaping Use    Vaping Use: Never used   Substance and Sexual Activity    Alcohol use: No    Drug use: No    Sexual activity: Yes     Partners: Female     Birth control/protection: Condom   Other Topics Concern    Not on file   Social History Narrative    Not on file     Social Determinants of Health     Financial Resource Strain: Low Risk     Difficulty of Paying Living Expenses: Not very hard   Food Insecurity: No Food Insecurity    Worried About Running Out of Food in the Last Year: Never true    Ran Out of Food in the Last Year: Never true   Transportation Needs: Unknown    Lack of Transportation (Medical): Not on file    Lack of Transportation (Non-Medical):  No   Physical Activity: Not on file   Stress: Not on file   Social Connections: Not on file   Intimate Partner Violence: Not on file   Housing Stability: Unknown    Unable to Pay for Housing in the Last Year: Not on file    Number of Places Lived in the Last Year: Not on file    Unstable Housing in the Last Year: No      /71 (Site: Left Upper Arm, Position: Sitting, Cuff Size: Medium Adult)   Pulse 64   Ht 5' 8\" (1.727 m)   Wt 235 lb (106.6 kg)   BMI 35.73 kg/m²       HEENT [] Hearing Loss  [x] Visual Disturbance  [] Tinnitus  [] Eye pain   Respiratory [] Shortness of Breath  [] Cough  [] Snoring   Cardiovascular [] Chest Pain  [] Palpitations  [] Lightheaded   GI [] Constipation  [] Diarrhea  [] Swallowing change  [] Nausea/vomiting    [] Urinary Frequency  [] Urinary Urgency   Musculoskeletal [] Neck pain  [] Back pain  [x] Muscle pain  [] Restless legs   Dermatologic [] Skin changes   Neurologic [] Memory loss/confusion  [] Seizures  [] Trouble walking or imbalance  [] Dizziness  [] Sleep disturbance  [] Weakness  [] Numbness  [] Tremors  [] Speech Difficulty  [] Headaches  [] Light Sensitivity  [] Sound Sensitivity   Endocrinology []Excessive thirst  []Excessive hunger   Psychiatric [] Anxiety/Depression  [] Hallucination   Allergy/immunology []Hives/environmental allergies   Hematologic/lymph [] Abnormal bleeding  [] Abnormal bruising         General appearence: Normal. Well groomed.  In no acute distress    Head: Normocephalic, atraumatic  Eyes: Extraocular movements intact, eye lids normal  Lungs: Respirations unlabored, chest wall no deformity  ENT: Normal external ear canals, no sinus tenderness  Heart: Regular rate rhythm  Abdomen: No masses, tenderness  Extremities: No cyanosis or edema, 2+ pulses  Musculoskeletal: Normal range of motion in all joints  Skin: Intact, normal skin color    Neurological examination:    Mental status   Alert and oriented; intact memory with no confusion, speech or language problems; no hallucinations or delusions     Cranial nerves   II - visual fields intact to confrontation                                                III, IV, VI - extra-ocular muscles full: no pupillary defect; no KANDI, no nystagmus, no ptosis   V - normal facial sensation                                                               VII - normal facial symmetry VIII - intact hearing                                                                             IX, X - symmetrical palate                                                                  XI - symmetrical shoulder shrug                                                       XII - midline tongue without atrophy or fasciculation     Motor function  Normal muscle bulk and tone; normal power 5/5, including fine motor movements     Sensory function Intact to touch, pin, vibration, proprioception     Cerebellar Intact fine motor movement. No involuntary movements or tremors     Reflex function Intact 2+ DTR and symmetric. Negative Babinski     Gait                  Normal station and gait       Lab Results   Component Value Date    LDLCHOLESTEROL 116 08/01/2022     No components found for: CHLPL  Lab Results   Component Value Date    TRIG 220 (H) 03/27/2022     Lab Results   Component Value Date    HDL 48 08/01/2022    HDL 53 03/27/2022    HDL 54 09/09/2021     No results found for: LDLCALC  No results found for: LABVLDL  Lab Results   Component Value Date    LABA1C 6.1 02/13/2023     Lab Results   Component Value Date     11/30/2022     Lab Results   Component Value Date    [id number] 778 08/01/2022      Neurological work up:  CT head  CTA head and neck  MRI brain   2 D echo     All of patient's labs were personally reviewed. All the imaging studies were personally reviewed and discussed with the patient. Neurological work up:  Study Date Finding   CT head       CTA Head and neck       MRI brain       2 D Echo       DARWIN       EMG 10/27/2022      Assessment/Recommendations:    Patient with transient bilateral visual obscuration:  Since the last visit, the patient has had one episode of bilateral visual obscuration which occurred when he moved from a bending to upright position. The patient has been seen by neuro-ophthalmology.  Reports are not available to me but apparently no visible abnormality has been found. At this time, no neurological intervention is warranted as these symptoms may be related to changes in BP. For now, I will keep the patient under observation. I will obtain a repeat CTA Head Neck W WO Contrast for repeat carotid evaluation. 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 There was some fatty replacement seen involving the soleus muscle at the level of the proximal tibial diaphysis. The patient continues to have intermittent left knee pain radiating to his left lower calf, along with left foot tingling sensation. He has positive Tinel's sign in the left knee. The patient underwent EMG nerve conduction study which showed absence of bilateral superficial sensory responses but did not show other evidence of neuropathy. 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 Will refer to neurosurgery. All questions were answered. Patient to follow up in 4-5 months or sooner if symptoms worsen. Melissa Caldwell, DO I would like to thank you for the consult. Please do not hesitate if you have any questions about the patient care. Scribe Attestation:   By signing my name below, Feng Teixeira, attest that this documentation has been prepared under the direction and in the presence of Elberta Phoenix MD.    Electronically Signed: Katelyn Sutton. 02/13/23. 3:42 PM     This note is created with the assistance of a speech-recognition program. While intending to generate a document that actually reflects the content of the visit, the document can still have some errors including those of syntax and sound a- like substitutions which may escape proofreading. In such instances, actual meaning can be extrapolated by contextual derivation.

## 2023-03-08 ENCOUNTER — HOSPITAL ENCOUNTER (OUTPATIENT)
Dept: PHYSICAL THERAPY | Age: 63
Setting detail: THERAPIES SERIES
Discharge: HOME | End: 2023-03-08

## 2023-03-09 NOTE — DISCHARGE SUMMARY
[x] [facility]) [facility] &  Therapy  [address]  [phone]  F: [phone] [] [address]  [address] [address]   [address]  P: [phone]  F: [phone] [] [place]  [address] [facility]  P: [phone]  F: [phone] [] [address]  [facility]   [address]   [place] Hipps: [phone]  F: [phone]         Physical Therapy Discharge Note    Date: 3/8/2023      Patient: Butch Emerson  : 1960  MRN: [id number]  Physician: Dr. Stevens Dill: Christiano Martel after eval)  Medical Diagnosis: G57.32 (ICD-10-CM) - Peroneal mononeuropathy, left  Rehab Codes: [id number], M25.672, M62.81  Next 's appt.: EMG 10/24, neuro appt   Date of symptom onset: May 2019  Total visits attended: 1  Cancels/No shows: 0  Date of initial visit: 22                Date of final visit: 22      Subjective:  Refer to initial evaluation. Objective:  Refer to initial evaluation. Assessment:  Refer to initial evaluation. Treatment to Date:  [] Therapeutic Exercise    [] Modalities:  [] Therapeutic Activity    [] Ultrasound  [] Electrical Stimulation  [] Gait Training     [] Massage       [] Lumbar/Cervical Traction  [] Neuromuscular Re-education [] Cold/hotpack [] Iontophoresis: 4 mg/mL  [] Instruction in Home Exercise Program                     Dexamethasone Sodium  [] Manual Therapy             Phosphate 40-80 mAmin  [] Aquatic Therapy                   [] Vasocompression/    [] Other:             Game Ready    Discharge Status:     [] Pt to continue exercise/home instructions independently. [] Therapy interrupted due to:    [] Pt has 2 or more no shows/cancels, is discontinued per our policy. [x] Other:     10/4/22- No further appts on schedule.  Will discharge on 10/21 if no contact from patient by that time. Pt never called to return; discharge. Electronically signed by Jazmin Goss PT on 3/8/2023 at 10:28 PM      If you have any questions or concerns, please don't hesitate to call.   Thank you for your referral.

## 2023-03-17 ENCOUNTER — HOSPITAL ENCOUNTER (OUTPATIENT)
Age: 63
Discharge: HOME | End: 2023-03-17
Payer: MEDICAID

## 2023-03-17 ENCOUNTER — OFFICE VISIT (OUTPATIENT)
Dept: UROLOGY | Age: 63
End: 2023-03-17
Payer: MEDICAID

## 2023-03-17 VITALS
SYSTOLIC BLOOD PRESSURE: 125 MMHG | DIASTOLIC BLOOD PRESSURE: 73 MMHG | BODY MASS INDEX: 35.61 KG/M2 | HEIGHT: 68 IN | HEART RATE: 79 BPM | WEIGHT: 235 LBS

## 2023-03-17 DIAGNOSIS — C61 PROSTATE CANCER (HCC): ICD-10-CM

## 2023-03-17 DIAGNOSIS — N52.9 ERECTILE DYSFUNCTION, UNSPECIFIED ERECTILE DYSFUNCTION TYPE: ICD-10-CM

## 2023-03-17 DIAGNOSIS — N39.3 STRESS INCONTINENCE: ICD-10-CM

## 2023-03-17 DIAGNOSIS — C61 PROSTATE CANCER (HCC): Primary | ICD-10-CM

## 2023-03-17 LAB — PROSTATE SPECIFIC ANTIGEN: <0.02 NG/ML

## 2023-03-17 PROCEDURE — 3074F SYST BP LT 130 MM HG: CPT | Performed by: UROLOGY

## 2023-03-17 PROCEDURE — 84153 ASSAY OF PSA TOTAL: CPT

## 2023-03-17 PROCEDURE — 36415 COLL VENOUS BLD VENIPUNCTURE: CPT

## 2023-03-17 PROCEDURE — 99214 OFFICE O/P EST MOD 30 MIN: CPT | Performed by: UROLOGY

## 2023-03-17 PROCEDURE — G8484 FLU IMMUNIZE NO ADMIN: HCPCS | Performed by: UROLOGY

## 2023-03-17 PROCEDURE — 3017F COLORECTAL CA SCREEN DOC REV: CPT | Performed by: UROLOGY

## 2023-03-17 PROCEDURE — 1036F TOBACCO NON-USER: CPT | Performed by: UROLOGY

## 2023-03-17 PROCEDURE — 3078F DIAST BP <80 MM HG: CPT | Performed by: UROLOGY

## 2023-03-17 PROCEDURE — G8427 DOCREV CUR MEDS BY ELIG CLIN: HCPCS | Performed by: UROLOGY

## 2023-03-17 PROCEDURE — G8417 CALC BMI ABV UP PARAM F/U: HCPCS | Performed by: UROLOGY

## 2023-03-17 RX ORDER — OXYBUTYNIN CHLORIDE 10 MG/1
10 TABLET, EXTENDED RELEASE ORAL DAILY
Qty: 30 TABLET | Refills: 2 | Status: SHIPPED | OUTPATIENT
Start: 2023-03-17

## 2023-03-17 NOTE — PROGRESS NOTES
Ammy Estrada MD        [facility] PHYSICIANS  [facility] [address]  [address] [address] [facility] [place] [address]  Dept: [phone]  Dept Fax: [phone]      [facility] Office Note -     Patient:  Julieta Alcocer  YOB: 1960  Date: 3/17/2023    The patient is a 63 y.o. male who presents today for evaluation of the following problems:   Chief Complaint   Patient presents with    Prostate Cancer    referred/consultation requested by Eva Caldwell DO.    HISTORY OF PRESENT ILLNESS:     Prostate cancer  S/p ralp  No recent psa    JUSTIN  stable    ED  Worsening  Sildenafil not helping  Discussed prosthesis  Trimix not helpful but wants refill    Phimosis  S/p circ              Requested/reviewed records from Swapnil Ceballos DO office and/or outside physician/EMR    (Patient's old records have been requested, reviewed and pertinent findings summarized in today's note.)    Last several PSA's:  Lab Results   Component Value Date    PSA <0.02 10/17/2022    PSA <0.01 04/16/2021    PSA <0.01 10/08/2020       Last total testosterone:  Lab Results   Component Value Date    TESTOSTERONE 236 10/17/2022       Urinalysis today:  No results found for this visit on 03/17/23. Last BUN and creatinine:  Lab Results   Component Value Date    BUN 18 08/01/2022     Lab Results   Component Value Date    CREATININE 0.63 12/16/2022         Imaging Reviewed during this Office Visit:   Ammy Estrada MD independently reviewed the images and verified the radiology reports from:    Ct Sinus Wo Contrast    Result Date: 10/9/2019  EXAMINATION: CT OF THE SINUS WITHOUT CONTRAST  10/9/2019 4:02 pm TECHNIQUE: CT of the sinuses was performed without the administration of intravenous contrast. Multiplanar reformatted images are provided for review.  Dose modulation, iterative reconstruction, and/or weight based adjustment of the mA/kV was utilized to reduce the radiation dose to as low as reasonably achievable. COMPARISON: None HISTORY: ORDERING SYSTEM PROVIDED HISTORY: Chronic maxillary sinusitis FINDINGS: SINUSES/MASTOIDS:  Limited coronal imaging of the paranasal sinuses demonstrates multifocal mucosal thickening. No definitive air-fluid levels are noted. Leftward deviation of the nasal septum is noted. SOFT TISSUES:  Soft tissues are markedly limited. Exam is presented in the coronal plane only and in the bone algorithm only.      Minimal mucosal thickening is noted No definitive air-fluid level is noted       PAST MEDICAL, FAMILY AND SOCIAL HISTORY:  Past Medical History:   Diagnosis Date    Cancer ([facility]) 2019    prostate    Hematuria     Hypertension     Intermittent abdominal pain     Nerve injury     left leg , states  s/p prostatectomy    DILAN (obstructive sleep apnea)     DOES NOT HAVE MACHINE    Prediabetes      Past Surgical History:   Procedure Laterality Date    APPENDECTOMY      CIRCUMCISION N/A 07/10/2020    CIRCUMCISION performed by Otis Obando MD at [address] [facility] N/A 02/01/2021    COLONOSCOPY POLYPECTOMY HOT BIOPSY performed by Colten Cooper MD at [address] [facility] N/A 02/07/2019    CYSTOSCOPY performed by Otis Obando MD at [address] [facility] N/A 05/28/2020    CYSTOSCOPY performed by Otis Obando MD at [place] [address]  06/25/2021    XI ROBOTIC [address] [facility] N/A 06/25/2021    XI ROBOTIC [facility] performed by Brendan Diaz IV, DO at [place] [address] Right 2004    NECK    PROSTATE BIOPSY N/A 03/21/2019    PROSTATE BIOPSY, ULTRASOUND  (OFFICE TO CONTACT [place] performed by Otis Obando MD at [facility] N/A 05/31/2019    XI LAPAROSCOPIC ROBOTIC RADICAL PROSTATECTOMY, PELVIC LYMPH NODE DISSECTION performed by Otis Obando MD at [address]  05/2022     Family History   Problem Relation Age of Onset    Other Mother Kidney issues    High Blood Pressure Mother     No Known Problems Father     Lupus Sister     High Blood Pressure Sister     No Known Problems Sister     No Known Problems Sister     No Known Problems Sister     No Known Problems Brother     No Known Problems Brother     No Known Problems Brother     No Known Problems Brother     No Known Problems Maternal Grandmother     No Known Problems Maternal Grandfather     No Known Problems Paternal Grandmother     No Known Problems Paternal Grandfather      Outpatient Medications Marked as Taking for the 3/17/23 encounter (Office Visit) with Joyce Francois MD   Medication Sig Dispense Refill    tamsulosin (FLOMAX) 0.4 MG capsule Take 1 capsule by mouth daily 30 capsule 3    dapagliflozin (FARXIGA) 10 MG tablet Take 1 tablet by mouth every morning 30 tablet 5    olmesartan-hydroCHLOROthiazide (BENICAR HCT) 40-25 MG per tablet Take 1 tablet by mouth daily 30 tablet 3    atorvastatin (LIPITOR) 40 MG tablet Take 1 tablet by mouth nightly 90 tablet 3    blood glucose monitor strips Test 2 times a day & as needed for symptoms of irregular blood glucose. Dispense sufficient amount for indicated testing frequency plus additional to accommodate PRN testing needs. 100 strip 5    glucose monitoring (FREESTYLE FREEDOM) kit 1 kit by Does not apply route daily 1 kit 0    Lancets MISC 1 each by Does not apply route 2 times daily 100 each 5    meloxicam (MOBIC) 7.5 MG tablet Take 7.5 mg by mouth in the morning. nitroGLYCERIN (NITROSTAT) 0.4 MG SL tablet up to max of 3 total doses. If no relief after 1 dose, call 911. 25 tablet 0       Patient has no known allergies.   Social History     Tobacco Use   Smoking Status Never   Smokeless Tobacco Never      (If patient a smoker, smoking cessation counseling offered)   Social History     Substance and Sexual Activity   Alcohol Use No       REVIEW OF SYSTEMS:  Constitutional: negative  Eyes: negative  Respiratory: negative  Cardiovascular: negative  Gastrointestinal: negative  Genitourinary: see HPI  Musculoskeletal: negative  Skin: negative   Neurological: negative  Hematological/Lymphatic: negative  Psychological: negative      Physical Exam:    This a 63 y.o. male  Vitals:    03/17/23 1023   BP: 125/73   Pulse: 79     Body mass index is 35.73 kg/m². Constitutional: Patient in no acute distress;       Assessment and Plan        1. Prostate cancer ([address]    2. Erectile dysfunction, unspecified erectile dysfunction type    3. Stress incontinence               Plan:       neuropathic pain after surgery has remained (leg pain/numbness)  Doing well after circumcision- is complaining of weight gain and buried penis. Recommend weight loss. Pt does complain of loss of penile length  Voiding w no incontinence  Psa has not been drawn for some time  ED- worsening. trimix ordered for patient. Pt may elect for prosthesis in the future. I discussed that length IS NOT improved with this surgery. Will follow up for trimix injections. I resent this. Oab- start ditropan for worsening freq/urg  Incisional hernia- has had repair        2 mo ditropan check and psa      Prescriptions Ordered:  No orders of the defined types were placed in this encounter. Orders Placed:  No orders of the defined types were placed in this encounter.

## 2023-03-28 ENCOUNTER — OFFICE VISIT (OUTPATIENT)
Dept: NEUROSURGERY | Age: 63
End: 2023-03-28

## 2023-03-28 VITALS
TEMPERATURE: 98 F | DIASTOLIC BLOOD PRESSURE: 75 MMHG | SYSTOLIC BLOOD PRESSURE: 128 MMHG | OXYGEN SATURATION: 92 % | WEIGHT: 235 LBS | HEART RATE: 84 BPM | RESPIRATION RATE: 20 BRPM | BODY MASS INDEX: 35.73 KG/M2

## 2023-03-28 DIAGNOSIS — G57.32 LEFT PERONEAL MONONEUROPATHY: Primary | ICD-10-CM

## 2023-03-28 NOTE — PROGRESS NOTES
Brother     No Known Problems Brother     No Known Problems Maternal Grandmother     No Known Problems Maternal Grandfather     No Known Problems Paternal Grandmother     No Known Problems Paternal Grandfather        Allergies:  Patient has no known allergies. Home Medications:  Prior to Admission medications    Medication Sig Start Date End Date Taking? Authorizing Provider   oxybutynin (DITROPAN XL) 10 MG extended release tablet Take 1 tablet by mouth daily 3/17/23  Yes Germain Wagner MD   tamsulosin [facility]) 0.4 MG capsule Take 1 capsule by mouth daily 2/9/23  Yes Melissa Caldwell DO   dapagliflozin (FARXIGA) 10 MG tablet Take 1 tablet by mouth every morning 2/9/23  Yes Melissa Caldwell DO   olmesartan-hydroCHLOROthiazide (BENICAR HCT) 40-25 MG per tablet Take 1 tablet by mouth daily 2/9/23  Yes Melissa Caldwell DO   atorvastatin (LIPITOR) 40 MG tablet Take 1 tablet by mouth nightly 2/9/23  Yes Melissa Caldwell DO   blood glucose monitor strips Test 2 times a day & as needed for symptoms of irregular blood glucose. Dispense sufficient amount for indicated testing frequency plus additional to accommodate PRN testing needs. 2/9/23  Yes Melissa Caldwell DO   glucose monitoring (FREESTYLE FREEDOM) kit 1 kit by Does not apply route daily 10/17/22  Yes Melissa Caldwell DO   Lancets MISC 1 each by Does not apply route 2 times daily 10/17/22  Yes Melissa Caldwell DO   meloxicam (MOBIC) 7.5 MG tablet Take 7.5 mg by mouth in the morning. 7/18/22  Yes Historical Provider, MD   nitroGLYCERIN (NITROSTAT) 0.4 MG SL tablet up to max of 3 total doses.  If no relief after 1 dose, call 911. 3/28/22  Yes Jarrell Brown MD   olmesartan-hydroCHLOROthiazide (BENICAR HCT) 20-12.5 MG per tablet Take 1 tablet by mouth daily  Patient not taking: No sig reported 2/13/23   Melissa Caldwell DO   metFORMIN (GLUCOPHAGE) 500 MG tablet  10/7/22   Historical Provider, MD   Ascorbic Acid (VITAMIN C PO) Take by mouth  Patient not taking: No sig reported

## 2023-04-01 DIAGNOSIS — E11.69 TYPE 2 DIABETES MELLITUS WITH OTHER SPECIFIED COMPLICATION, WITHOUT LONG-TERM CURRENT USE OF INSULIN (HCC): ICD-10-CM

## 2023-04-03 RX ORDER — LANCETS 33 GAUGE
EACH MISCELLANEOUS
Qty: 100 EACH | Refills: 5 | Status: SHIPPED | OUTPATIENT
Start: 2023-04-03

## 2023-04-03 NOTE — TELEPHONE ENCOUNTER
Marina Macias is calling to request a refill on the following medication(s):    Medication Request:  Requested Prescriptions     Pending Prescriptions Disp Refills    Lancets ([address]) [address] [facility] [Pharmacy Med Name: ONE TOUCH DELICA PLUS 86Y LANCETS] 100 each 5     Sig: TESTING TWICE DAILY       Last Visit Date (If Applicable):  3/00/7353    Next Visit Date:    Visit date not found

## 2023-04-06 RX ORDER — SODIUM CHLORIDE, SODIUM LACTATE, POTASSIUM CHLORIDE, CALCIUM CHLORIDE 600; 310; 30; 20 MG/100ML; MG/100ML; MG/100ML; MG/100ML
1000 INJECTION, SOLUTION INTRAVENOUS CONTINUOUS
Status: CANCELLED | OUTPATIENT
Start: 2023-04-06

## 2023-04-12 ENCOUNTER — HOSPITAL ENCOUNTER (OUTPATIENT)
Dept: PREADMISSION TESTING | Age: 63
Discharge: HOME | End: 2023-04-16
Payer: MEDICAID

## 2023-04-12 VITALS
HEIGHT: 68 IN | DIASTOLIC BLOOD PRESSURE: 73 MMHG | OXYGEN SATURATION: 94 % | SYSTOLIC BLOOD PRESSURE: 140 MMHG | BODY MASS INDEX: 36.53 KG/M2 | HEART RATE: 97 BPM | RESPIRATION RATE: 18 BRPM | TEMPERATURE: 98.4 F | WEIGHT: 241 LBS

## 2023-04-12 LAB
HCT VFR BLD AUTO: 44 % (ref 40.7–50.3)
HGB BLD-MCNC: 15 G/DL (ref 13–17)
INR PPP: 0.9
MCH RBC QN AUTO: 28.6 PG (ref 25.2–33.5)
MCHC RBC AUTO-ENTMCNC: 34.1 G/DL (ref 28.4–34.8)
MCV RBC AUTO: 84 FL (ref 82.6–102.9)
NRBC AUTOMATED: 0 PER 100 WBC
PARTIAL THROMBOPLASTIN TIME: 24.6 SEC (ref 23–36.5)
PDW BLD-RTO: 13.5 % (ref 11.8–14.4)
PLATELET # BLD AUTO: 213 K/UL (ref 138–453)
PMV BLD AUTO: 11 FL (ref 8.1–13.5)
PROTHROMBIN TIME: 12 SEC (ref 11.7–14.9)
RBC # BLD: 5.24 M/UL (ref 4.21–5.77)
REASON FOR REJECTION: NORMAL
WBC # BLD AUTO: 6.6 K/UL (ref 3.5–11.3)
ZZ NTE CLEAN UP: ORDERED TEST: NORMAL
ZZ NTE WITH NAME CLEAN UP: SPECIMEN SOURCE: NORMAL

## 2023-04-12 PROCEDURE — 85730 THROMBOPLASTIN TIME PARTIAL: CPT

## 2023-04-12 PROCEDURE — 86900 BLOOD TYPING SEROLOGIC ABO: CPT

## 2023-04-12 PROCEDURE — 85610 PROTHROMBIN TIME: CPT

## 2023-04-12 PROCEDURE — 86901 BLOOD TYPING SEROLOGIC RH(D): CPT

## 2023-04-12 PROCEDURE — 93005 ELECTROCARDIOGRAM TRACING: CPT | Performed by: ANESTHESIOLOGY

## 2023-04-12 PROCEDURE — 85027 COMPLETE CBC AUTOMATED: CPT

## 2023-04-12 PROCEDURE — 86850 RBC ANTIBODY SCREEN: CPT

## 2023-04-12 PROCEDURE — 36415 COLL VENOUS BLD VENIPUNCTURE: CPT

## 2023-04-12 ASSESSMENT — PAIN DESCRIPTION - PAIN TYPE: TYPE: CHRONIC PAIN

## 2023-04-12 ASSESSMENT — PAIN DESCRIPTION - FREQUENCY: FREQUENCY: INTERMITTENT

## 2023-04-12 ASSESSMENT — PAIN SCALES - GENERAL: PAINLEVEL_OUTOF10: 6

## 2023-04-12 ASSESSMENT — PAIN DESCRIPTION - ORIENTATION: ORIENTATION: LEFT

## 2023-04-12 ASSESSMENT — PAIN DESCRIPTION - LOCATION: LOCATION: KNEE

## 2023-04-12 NOTE — H&P
History and Physical    Pt Name: Yoselin Smith  MRN: [id number]  YOB: 1960  Date of evaluation: 4/12/2023  Primary Care Physician: Morena Coe DO    SUBJECTIVE:   History of Chief Complaint:    Yoselin Smith is a 63 y.o. male who presents for PAT appointment. Patient complains of left lateral leg pain, mostly posterior knee region for years. Patient denies any injury or trauma but states he has numbness and tingling to the left leg, causing him difficulty with walking. Patient denies any use of ambulatory aide. He denies any history of physical therapy or injections. Patient has been scheduled for PERONEAL NERVE DECOMPRESSION (SUPINE, [facility] CONF# [id number] -[place]) - Left  Allergies  has No Known Allergies. Medications  Prior to Admission medications    Medication Sig Start Date End Date Taking? Authorizing Provider   Lancets (ONETOUCH DELICA PLUS [id number]) MISC TESTING TWICE DAILY 4/3/23   Melissa Caldwell, DO   olmesartan-hydroCHLOROthiazide (BENICAR HCT) 20-12.5 MG per tablet Take 1 tablet by mouth daily 2/13/23   Melissa Caldwell, DO   tamsulosin (FLOMAX) 0.4 MG capsule Take 1 capsule by mouth daily 2/9/23   Melissa Caldwell, DO   dapagliflozin (FARXIGA) 10 MG tablet Take 1 tablet by mouth every morning 2/9/23   Melissa Caldwell, DO   blood glucose monitor strips Test 2 times a day & as needed for symptoms of irregular blood glucose. Dispense sufficient amount for indicated testing frequency plus additional to accommodate PRN testing needs. 2/9/23   Melissa Caldwell, DO   glucose monitoring (FREESTYLE FREEDOM) kit 1 kit by Does not apply route daily 10/17/22   Melissa Caldwell DO   nitroGLYCERIN (NITROSTAT) 0.4 MG SL tablet up to max of 3 total doses.  If no relief after 1 dose, call 911. 3/28/22   Antelmo Goodman MD     Past Medical History    has a past medical history of Cancer [facility]), DM (diabetes mellitus) ([facility] [address] Hematuria, HLD (hyperlipidemia), Hypertension, Intermittent abdominal pain, Nerve injury, DILAN

## 2023-04-12 NOTE — PROGRESS NOTES
Anesthesia Focused Assessment    Hx of anesthesia complications:  early emergence  Family hx of anesthesia complications:  no      Tested positive for Covid-19 in last 8 weeks: no      STOP-BANG Sleep Apnea Questionnaire    SNORE loudly (heard through closed doors)? Yes  TIRED, fatigued, sleepy during daytime? No  OBSERVED stopping breathing during sleep? Yes  High blood PRESSURE or being treated? Yes    BMI over 35? Yes  AGE over 48? Yes  NECK circumference over 16\"? No  GENDER (male)? Yes             Total 6  High risk 5-8  Intermediate risk 3-4  Low risk 0-2    ----------------------------------------------------------------------------------------------------------------------  DILAN                              Yes  If yes, machine? No    DM1                                            No  DM2                   Yes    Coronary Artery Disease      No  HTN         Yes, on meds  Defib/AICD/Pacemaker               No             Renal Failure                   No  If yes, on dialysis           Active smoker? No  Drinks alcohol? No  Illicit drugs? No  Dentition?        benign      Past Medical History:   Diagnosis Date    Cancer ([facility] [address] 2019    prostate. Dr. Ling Rust    DM (diabetes mellitus) [facility])     pre   PCP    Hematuria     HLD (hyperlipidemia)     Hypertension     Intermittent abdominal pain     Nerve injury     left leg , states  s/p prostatectomy    DILAN (obstructive sleep apnea)     DOES NOT HAVE MACHINE    Peroneal neuropathy     Prediabetes          Patient was evaluated in PAT & anesthesia guidelines were applied. NPO guidelines, medication instructions and scheduled arrival time were reviewed with patient. Advised patient or guardian to please notify surgeon's office if patient or child becomes ill prior to surgery.

## 2023-04-13 ENCOUNTER — TELEPHONE (OUTPATIENT)
Dept: NEUROSURGERY | Age: 63
End: 2023-04-13

## 2023-04-13 LAB
EKG ATRIAL RATE: 89 BPM
EKG P AXIS: 32 DEGREES
EKG P-R INTERVAL: 164 MS
EKG Q-T INTERVAL: 382 MS
EKG QRS DURATION: 86 MS
EKG QTC CALCULATION (BAZETT): 464 MS
EKG R AXIS: -28 DEGREES
EKG T AXIS: 35 DEGREES
EKG VENTRICULAR RATE: 89 BPM

## 2023-04-13 PROCEDURE — 93010 ELECTROCARDIOGRAM REPORT: CPT | Performed by: INTERNAL MEDICINE

## 2023-04-18 DIAGNOSIS — E78.2 MIXED HYPERLIPIDEMIA: ICD-10-CM

## 2023-04-18 RX ORDER — ATORVASTATIN CALCIUM 40 MG/1
40 TABLET, FILM COATED ORAL NIGHTLY
Qty: 90 TABLET | Refills: 3 | Status: SHIPPED | OUTPATIENT
Start: 2023-04-18

## 2023-04-18 NOTE — TELEPHONE ENCOUNTER
[facility] is calling to request a refill on the following medication(s):    Last Visit Date (If Applicable):  2/05/9783    Next Visit Date:    Visit date not found    Medication Request:  Requested Prescriptions     Pending Prescriptions Disp Refills    atorvastatin (LIPITOR) 40 MG tablet 90 tablet 3     Sig: Take 1 tablet by mouth nightly

## 2023-04-19 ENCOUNTER — OFFICE VISIT (OUTPATIENT)
Dept: FAMILY MEDICINE CLINIC | Age: 63
End: 2023-04-19
Payer: MEDICAID

## 2023-04-19 VITALS
OXYGEN SATURATION: 92 % | BODY MASS INDEX: 36.53 KG/M2 | HEIGHT: 68 IN | HEART RATE: 108 BPM | SYSTOLIC BLOOD PRESSURE: 138 MMHG | DIASTOLIC BLOOD PRESSURE: 74 MMHG | WEIGHT: 241 LBS

## 2023-04-19 DIAGNOSIS — G57.32 NEUROPATHY OF LEFT PERONEAL NERVE: ICD-10-CM

## 2023-04-19 DIAGNOSIS — E78.2 MIXED HYPERLIPIDEMIA: ICD-10-CM

## 2023-04-19 DIAGNOSIS — E11.69 TYPE 2 DIABETES MELLITUS WITH OTHER SPECIFIED COMPLICATION, WITHOUT LONG-TERM CURRENT USE OF INSULIN (HCC): ICD-10-CM

## 2023-04-19 DIAGNOSIS — I10 PRIMARY HYPERTENSION: ICD-10-CM

## 2023-04-19 DIAGNOSIS — M79.605 LEG PAIN, LEFT: Primary | ICD-10-CM

## 2023-04-19 LAB
ABO/RH: NORMAL
ANTIBODY SCREEN: NEGATIVE
ARM BAND NUMBER: NORMAL
EXPIRATION DATE: NORMAL

## 2023-04-19 PROCEDURE — 1036F TOBACCO NON-USER: CPT | Performed by: FAMILY MEDICINE

## 2023-04-19 PROCEDURE — 3075F SYST BP GE 130 - 139MM HG: CPT | Performed by: FAMILY MEDICINE

## 2023-04-19 PROCEDURE — 3078F DIAST BP <80 MM HG: CPT | Performed by: FAMILY MEDICINE

## 2023-04-19 PROCEDURE — 3044F HG A1C LEVEL LT 7.0%: CPT | Performed by: FAMILY MEDICINE

## 2023-04-19 PROCEDURE — G8417 CALC BMI ABV UP PARAM F/U: HCPCS | Performed by: FAMILY MEDICINE

## 2023-04-19 PROCEDURE — G8427 DOCREV CUR MEDS BY ELIG CLIN: HCPCS | Performed by: FAMILY MEDICINE

## 2023-04-19 PROCEDURE — 2022F DILAT RTA XM EVC RTNOPTHY: CPT | Performed by: FAMILY MEDICINE

## 2023-04-19 PROCEDURE — 99214 OFFICE O/P EST MOD 30 MIN: CPT | Performed by: FAMILY MEDICINE

## 2023-04-19 PROCEDURE — 3017F COLORECTAL CA SCREEN DOC REV: CPT | Performed by: FAMILY MEDICINE

## 2023-04-19 RX ORDER — CHOLECALCIFEROL (VITAMIN D3) 125 MCG
5000 CAPSULE ORAL DAILY
Qty: 30 CAPSULE | Refills: 11 | Status: SHIPPED | OUTPATIENT
Start: 2023-04-19 | End: 2023-05-19

## 2023-04-19 ASSESSMENT — PATIENT HEALTH QUESTIONNAIRE - PHQ9
SUM OF ALL RESPONSES TO PHQ9 QUESTIONS 1 & 2: 0
SUM OF ALL RESPONSES TO PHQ QUESTIONS 1-9: 0
1. LITTLE INTEREST OR PLEASURE IN DOING THINGS: 0
SUM OF ALL RESPONSES TO PHQ QUESTIONS 1-9: 0
SUM OF ALL RESPONSES TO PHQ QUESTIONS 1-9: 0
2. FEELING DOWN, DEPRESSED OR HOPELESS: 0
SUM OF ALL RESPONSES TO PHQ QUESTIONS 1-9: 0

## 2023-04-19 NOTE — PROGRESS NOTES
anticipated. I have reviewed the instructions with the patient, answering all questions to his satisfaction. No follow-ups on file. No orders of the defined types were placed in this encounter.     Orders Placed This Encounter   Medications    vitamin D (VITAMIN D3 ULTRA STRENGTH) 125 MCG (5000 UT) CAPS capsule     Sig: Take 1 capsule by mouth daily     Dispense:  30 capsule     Refill:  11   Until the patient has to follow-up with the neurosurgeon figure out what he can do for more live with the pain  Cont all other meds   Electronically signed by Percy Purcell DO on 4/19/2023 at 5:40 PM

## 2023-05-01 DIAGNOSIS — G57.32 LEFT PERONEAL MONONEUROPATHY: Primary | ICD-10-CM

## 2023-05-16 ENCOUNTER — TELEPHONE (OUTPATIENT)
Dept: NEUROLOGY | Age: 63
End: 2023-05-16

## 2023-05-16 NOTE — TELEPHONE ENCOUNTER
05 16 2023 called patient times 2 to reschedule 07 03 2023 follow up appointment with Dr Josiane Phelps 2 times for the patient to call the office back to reschedule this appointment, no response. I called the patient a third time and stated that the appointment has been cancelled and I asked the patient to call the office to reschedule and I also mailed the patient a letter asking them to call the office back to reschedule this appointment.   KS

## 2023-05-23 RX ORDER — OXYBUTYNIN CHLORIDE 10 MG/1
10 TABLET, EXTENDED RELEASE ORAL DAILY
Qty: 30 TABLET | Refills: 2 | OUTPATIENT
Start: 2023-05-23

## 2023-07-10 ENCOUNTER — TELEPHONE (OUTPATIENT)
Dept: FAMILY MEDICINE CLINIC | Age: 63
End: 2023-07-10

## 2023-07-10 DIAGNOSIS — M79.605 LEG PAIN, LEFT: Primary | ICD-10-CM

## 2023-07-10 NOTE — TELEPHONE ENCOUNTER
No answer unable to leave message.  \" Person you are trying to call is not accepting messages at this time\"

## 2023-07-10 NOTE — TELEPHONE ENCOUNTER
Patient  is requesting a referral for water physical therapy due to leg pain, patient states that he was denied surgery due to not having tried water therapy for leg discomfort, pls advise

## 2023-07-13 NOTE — TELEPHONE ENCOUNTER
Pt called back asking to go to [facility] for [employer]. Leg Pain. Pt asking to call him back at p [phone], when ready for .

## 2023-08-01 ENCOUNTER — APPOINTMENT (OUTPATIENT)
Dept: CT IMAGING | Age: 63
End: 2023-08-01
Payer: MEDICAID

## 2023-08-01 ENCOUNTER — HOSPITAL ENCOUNTER (EMERGENCY)
Age: 63
Discharge: HOME | End: 2023-08-01
Attending: EMERGENCY MEDICINE
Payer: MEDICAID

## 2023-08-01 VITALS
HEART RATE: 57 BPM | DIASTOLIC BLOOD PRESSURE: 61 MMHG | HEIGHT: 68 IN | OXYGEN SATURATION: 96 % | TEMPERATURE: 97.7 F | RESPIRATION RATE: 14 BRPM | WEIGHT: 240 LBS | BODY MASS INDEX: 36.37 KG/M2 | SYSTOLIC BLOOD PRESSURE: 119 MMHG

## 2023-08-01 DIAGNOSIS — R51.9 ACUTE NONINTRACTABLE HEADACHE, UNSPECIFIED HEADACHE TYPE: Primary | ICD-10-CM

## 2023-08-01 LAB
ALBUMIN SERPL-MCNC: 4.3 G/DL (ref 3.5–5.2)
ALP SERPL-CCNC: 69 U/L (ref 40–129)
ALT SERPL-CCNC: 35 U/L (ref 5–41)
ANION GAP SERPL CALCULATED.3IONS-SCNC: 10 MMOL/L (ref 9–17)
AST SERPL-CCNC: 20 U/L
BASOPHILS # BLD: 0 K/UL (ref 0–0.2)
BASOPHILS NFR BLD: 1 % (ref 0–2)
BILIRUB SERPL-MCNC: 1.3 MG/DL (ref 0.3–1.2)
BUN SERPL-MCNC: 18 MG/DL (ref 8–23)
CALCIUM SERPL-MCNC: 9.1 MG/DL (ref 8.6–10.4)
CHLORIDE SERPL-SCNC: 104 MMOL/L (ref 98–107)
CO2 SERPL-SCNC: 26 MMOL/L (ref 20–31)
CREAT SERPL-MCNC: 0.9 MG/DL (ref 0.7–1.2)
EGFR, POC: >60 ML/MIN/1.73M2
EKG ATRIAL RATE: 72 BPM
EKG P AXIS: 10 DEGREES
EKG P-R INTERVAL: 186 MS
EKG Q-T INTERVAL: 422 MS
EKG QRS DURATION: 82 MS
EKG QTC CALCULATION (BAZETT): 462 MS
EKG R AXIS: -21 DEGREES
EKG T AXIS: 36 DEGREES
EKG VENTRICULAR RATE: 72 BPM
EOSINOPHIL # BLD: 0.2 K/UL (ref 0–0.4)
EOSINOPHILS RELATIVE PERCENT: 3 % (ref 0–4)
ERYTHROCYTE [DISTWIDTH] IN BLOOD BY AUTOMATED COUNT: 14.1 % (ref 11.5–14.9)
GFR SERPL CREATININE-BSD FRML MDRD: >60 ML/MIN/1.73M2
GLUCOSE SERPL-MCNC: 156 MG/DL (ref 70–99)
HCT VFR BLD AUTO: 44.4 % (ref 41–53)
HGB BLD-MCNC: 14.9 G/DL (ref 13.5–17.5)
INR PPP: 1
LIPASE SERPL-CCNC: 78 U/L (ref 13–60)
LYMPHOCYTES NFR BLD: 2.2 K/UL (ref 1–4.8)
LYMPHOCYTES RELATIVE PERCENT: 27 % (ref 24–44)
MAGNESIUM SERPL-MCNC: 2 MG/DL (ref 1.6–2.6)
MCH RBC QN AUTO: 28.1 PG (ref 26–34)
MCHC RBC AUTO-ENTMCNC: 33.6 G/DL (ref 31–37)
MCV RBC AUTO: 83.5 FL (ref 80–100)
MONOCYTES NFR BLD: 0.6 K/UL (ref 0.1–1.3)
MONOCYTES NFR BLD: 8 % (ref 1–7)
NEUTROPHILS NFR BLD: 61 % (ref 36–66)
NEUTS SEG NFR BLD: 5 K/UL (ref 1.3–9.1)
PLATELET # BLD AUTO: 189 K/UL (ref 150–450)
PMV BLD AUTO: 8.9 FL (ref 6–12)
POC CREATININE: 0.8 MG/DL (ref 0.51–1.19)
POTASSIUM SERPL-SCNC: 4 MMOL/L (ref 3.7–5.3)
PROT SERPL-MCNC: 7.3 G/DL (ref 6.4–8.3)
PROTHROMBIN TIME: 13.1 SEC (ref 11.8–14.6)
RBC # BLD AUTO: 5.32 M/UL (ref 4.5–5.9)
SODIUM SERPL-SCNC: 140 MMOL/L (ref 135–144)
TROPONIN I SERPL HS-MCNC: 13 NG/L (ref 0–22)
WBC OTHER # BLD: 8 K/UL (ref 3.5–11)

## 2023-08-01 PROCEDURE — 96375 TX/PRO/DX INJ NEW DRUG ADDON: CPT

## 2023-08-01 PROCEDURE — 70498 CT ANGIOGRAPHY NECK: CPT

## 2023-08-01 PROCEDURE — 36415 COLL VENOUS BLD VENIPUNCTURE: CPT

## 2023-08-01 PROCEDURE — 96374 THER/PROPH/DIAG INJ IV PUSH: CPT

## 2023-08-01 PROCEDURE — 80053 COMPREHEN METABOLIC PANEL: CPT

## 2023-08-01 PROCEDURE — 70450 CT HEAD/BRAIN W/O DYE: CPT

## 2023-08-01 PROCEDURE — 6360000004 HC RX CONTRAST MEDICATION: Performed by: EMERGENCY MEDICINE

## 2023-08-01 PROCEDURE — 93005 ELECTROCARDIOGRAM TRACING: CPT | Performed by: EMERGENCY MEDICINE

## 2023-08-01 PROCEDURE — 93010 ELECTROCARDIOGRAM REPORT: CPT | Performed by: INTERNAL MEDICINE

## 2023-08-01 PROCEDURE — 85025 COMPLETE CBC W/AUTO DIFF WBC: CPT

## 2023-08-01 PROCEDURE — 6360000002 HC RX W HCPCS: Performed by: EMERGENCY MEDICINE

## 2023-08-01 PROCEDURE — 83735 ASSAY OF MAGNESIUM: CPT

## 2023-08-01 PROCEDURE — 83690 ASSAY OF LIPASE: CPT

## 2023-08-01 PROCEDURE — 84484 ASSAY OF TROPONIN QUANT: CPT

## 2023-08-01 PROCEDURE — 99285 EMERGENCY DEPT VISIT HI MDM: CPT

## 2023-08-01 PROCEDURE — 85610 PROTHROMBIN TIME: CPT

## 2023-08-01 PROCEDURE — 2580000003 HC RX 258: Performed by: EMERGENCY MEDICINE

## 2023-08-01 PROCEDURE — 82565 ASSAY OF CREATININE: CPT

## 2023-08-01 RX ORDER — KETOROLAC TROMETHAMINE 30 MG/ML
15 INJECTION, SOLUTION INTRAMUSCULAR; INTRAVENOUS ONCE
Status: COMPLETED | OUTPATIENT
Start: 2023-08-01 | End: 2023-08-01

## 2023-08-01 RX ORDER — DIPHENHYDRAMINE HYDROCHLORIDE 50 MG/ML
25 INJECTION INTRAMUSCULAR; INTRAVENOUS ONCE
Status: COMPLETED | OUTPATIENT
Start: 2023-08-01 | End: 2023-08-01

## 2023-08-01 RX ORDER — SODIUM CHLORIDE 0.9 % (FLUSH) 0.9 %
10 SYRINGE (ML) INJECTION PRN
Status: DISCONTINUED | OUTPATIENT
Start: 2023-08-01 | End: 2023-08-01 | Stop reason: HOSPADM

## 2023-08-01 RX ORDER — 0.9 % SODIUM CHLORIDE 0.9 %
100 INTRAVENOUS SOLUTION INTRAVENOUS ONCE
Status: COMPLETED | OUTPATIENT
Start: 2023-08-01 | End: 2023-08-01

## 2023-08-01 RX ORDER — PROCHLORPERAZINE EDISYLATE 5 MG/ML
10 INJECTION INTRAMUSCULAR; INTRAVENOUS ONCE
Status: COMPLETED | OUTPATIENT
Start: 2023-08-01 | End: 2023-08-01

## 2023-08-01 RX ADMIN — KETOROLAC TROMETHAMINE 15 MG: 30 INJECTION, SOLUTION INTRAMUSCULAR; INTRAVENOUS at 12:51

## 2023-08-01 RX ADMIN — SODIUM CHLORIDE 100 ML: 9 INJECTION, SOLUTION INTRAVENOUS at 11:06

## 2023-08-01 RX ADMIN — PROCHLORPERAZINE EDISYLATE 10 MG: 5 INJECTION INTRAMUSCULAR; INTRAVENOUS at 10:38

## 2023-08-01 RX ADMIN — SODIUM CHLORIDE, PRESERVATIVE FREE 10 ML: 5 INJECTION INTRAVENOUS at 11:06

## 2023-08-01 RX ADMIN — IOPAMIDOL 75 ML: 755 INJECTION, SOLUTION INTRAVENOUS at 11:06

## 2023-08-01 RX ADMIN — DIPHENHYDRAMINE HYDROCHLORIDE 25 MG: 50 INJECTION, SOLUTION INTRAMUSCULAR; INTRAVENOUS at 10:36

## 2023-08-01 ASSESSMENT — ENCOUNTER SYMPTOMS
EYE PAIN: 0
BACK PAIN: 0
ABDOMINAL PAIN: 0
COLOR CHANGE: 0
SHORTNESS OF BREATH: 0

## 2023-08-01 ASSESSMENT — PAIN SCALES - GENERAL
PAINLEVEL_OUTOF10: 8
PAINLEVEL_OUTOF10: 0

## 2023-08-01 ASSESSMENT — LIFESTYLE VARIABLES
HOW OFTEN DO YOU HAVE A DRINK CONTAINING ALCOHOL: NEVER
HOW MANY STANDARD DRINKS CONTAINING ALCOHOL DO YOU HAVE ON A TYPICAL DAY: PATIENT DOES NOT DRINK

## 2023-08-01 ASSESSMENT — PAIN - FUNCTIONAL ASSESSMENT: PAIN_FUNCTIONAL_ASSESSMENT: 0-10

## 2023-08-01 NOTE — ED TRIAGE NOTES
Mode of arrival (squad #, walk in, police, etc) : Walk In        Chief complaint(s): Headache        Arrival Note (brief scenario, treatment PTA, etc). : Pt arrives to ED c/o headache that he states he woke up with this morning. Patient denies any new numbness or tingling as well as no new weakness to one side. Patient denies a history of migraines.

## 2023-08-01 NOTE — ED PROVIDER NOTES
EMERGENCY DEPARTMENT ENCOUNTER    Pt Name: Karin Henry  MRN: [id number]  [address] [place] 1960  Date of evaluation: 8/1/23  CHIEF COMPLAINT       Chief Complaint   Patient presents with    Headache     HISTORY OF PRESENT ILLNESS   24-year-old male presents for complaints of right-sided headache. Patient reports that the headache woke him up from sleep this morning. Reports pain on the right side of his head. Describes as a sharp aching denies any significant radiation, denies anything making it better or worse. He denies any new numbness tingling or weakness to the extremities. He denies any vision loss or eye pain. He denies any associated dizziness or lightheadedness chest pain or shortness of breath. He denies any significant headache history, denies any current blood thinner use. The history is provided by the patient. REVIEW OF SYSTEMS     Review of Systems   Constitutional:  Negative for chills and fever. HENT:  Negative for congestion and ear pain. Eyes:  Negative for pain. Respiratory:  Negative for shortness of breath. Cardiovascular:  Negative for chest pain, palpitations and leg swelling. Gastrointestinal:  Negative for abdominal pain. Genitourinary:  Negative for dysuria and flank pain. Musculoskeletal:  Negative for back pain. Skin:  Negative for color change. Neurological:  Positive for headaches. Negative for numbness. Psychiatric/Behavioral:  Negative for confusion. All other systems reviewed and are negative. PASTMEDICAL HISTORY     Past Medical History:   Diagnosis Date    Cancer ([address]) 2019    prostate.  Dr. Jaswinder Sena    DM (diabetes mellitus) [facility])     pre   PCP    Hematuria     HLD (hyperlipidemia)     Hypertension     Intermittent abdominal pain     Nerve injury     left leg , states  s/p prostatectomy    DILAN (obstructive sleep apnea)     DOES NOT HAVE MACHINE    Peroneal neuropathy     Prediabetes      Past Problem List  Patient Active

## 2023-08-09 RX ORDER — OXYBUTYNIN CHLORIDE 10 MG/1
10 TABLET, EXTENDED RELEASE ORAL DAILY
Qty: 30 TABLET | Refills: 2 | Status: SHIPPED | OUTPATIENT
Start: 2023-08-09

## 2023-08-15 DIAGNOSIS — E11.69 TYPE 2 DIABETES MELLITUS WITH OTHER SPECIFIED COMPLICATION, WITHOUT LONG-TERM CURRENT USE OF INSULIN (HCC): ICD-10-CM

## 2023-08-15 RX ORDER — DAPAGLIFLOZIN 10 MG/1
TABLET, FILM COATED ORAL
Qty: 30 TABLET | Refills: 5 | Status: SHIPPED | OUTPATIENT
Start: 2023-08-15

## 2023-10-31 DIAGNOSIS — I10 PRIMARY HYPERTENSION: ICD-10-CM

## 2023-10-31 RX ORDER — OLMESARTAN MEDOXOMIL AND HYDROCHLOROTHIAZIDE 20/12.5 20; 12.5 MG/1; MG/1
1 TABLET ORAL DAILY
Qty: 90 TABLET | Refills: 1 | Status: SHIPPED | OUTPATIENT
Start: 2023-10-31

## 2023-10-31 NOTE — TELEPHONE ENCOUNTER
Johny Dahl is calling to request a refill on the following medication(s):    Last Visit Date (If Applicable):  4/14/3490    Next Visit Date:    Visit date not found    Medication Request:  Requested Prescriptions     Pending Prescriptions Disp Refills    olmesartan-hydroCHLOROthiazide (BENICAR HCT) 20-12.5 MG per tablet [Pharmacy Med Name: OLMESARTAN MEDOX/HCTZ 20-12.5MG TAB] 90 tablet 1     Sig: TAKE 1 TABLET BY MOUTH DAILY

## 2023-11-07 DIAGNOSIS — E11.69 TYPE 2 DIABETES MELLITUS WITH OTHER SPECIFIED COMPLICATION, WITHOUT LONG-TERM CURRENT USE OF INSULIN (HCC): ICD-10-CM

## 2023-11-07 RX ORDER — DAPAGLIFLOZIN 10 MG/1
TABLET, FILM COATED ORAL
Qty: 90 TABLET | Refills: 0 | Status: SHIPPED | OUTPATIENT
Start: 2023-11-07

## 2023-11-07 NOTE — TELEPHONE ENCOUNTER
Ashly Venegas is calling to request a refill on the following medication(s):    Medication Request:  Requested Prescriptions     Pending Prescriptions Disp Refills    FARXIGA 10 MG tablet [Pharmacy Med Name: FARXIGA 10MG TABLETS] 90 tablet      Sig: TAKE 1 TABLET BY MOUTH EVERY MORNING       Last Visit Date (If Applicable):  4/19/2023    Next Visit Date:    Visit date not found

## 2023-11-07 NOTE — TELEPHONE ENCOUNTER
Deborah Logan is calling to request a refill on the following medication(s):    Last Visit Date (If Applicable):  3/71/2140    Next Visit Date:    Visit date not found    Medication Request:  Requested Prescriptions     Pending Prescriptions Disp Refills    FARXIGA 10 MG tablet [Pharmacy Med Name: FARXIGA 10MG TABLETS] 90 tablet 0     Sig: TAKE 1 TABLET BY MOUTH EVERY MORNING
No

## 2023-11-08 RX ORDER — DAPAGLIFLOZIN 10 MG/1
TABLET, FILM COATED ORAL
Qty: 90 TABLET | Refills: 0 | Status: SHIPPED | OUTPATIENT
Start: 2023-11-08

## 2023-12-05 RX ORDER — OXYBUTYNIN CHLORIDE 10 MG/1
10 TABLET, EXTENDED RELEASE ORAL DAILY
Qty: 90 TABLET | Refills: 0 | Status: SHIPPED | OUTPATIENT
Start: 2023-12-05

## 2023-12-20 NOTE — BRIEF OP NOTE
Brief Postoperative Note  ______________________________________________________________    Patient: Verona Leavitt  YOB: 1960  MRN: [id number]  Date of Procedure: 5/31/2019    Pre-Op Diagnosis: PROSTATE CANCER    Post-Op Diagnosis: Same       Procedure(s):  XI LAPAROSCOPIC ROBOTIC RADICAL PROSTATECTOMY, PELVIC LYMPH NODE DISSECTION    Anesthesia: Anesthesia type not filed in the log. Surgeon(s):  MD Sixto Zamora MD    Assistant: Felice Nguyen MD    Estimated Blood Loss (mL): less than 50     Complications: None    Specimens:   ID Type Source Tests Collected by Time Destination   1 :  Urine Urine, catheter URINE CULTURE Sixto Malcolm MD 5/31/2019 1417    A : PROSTATE AND SEMINAL VESICLES Tissue Prostate SURGICAL PATHOLOGY Sixto Malcolm MD 5/31/2019 1928    B : BILATERAL PELVIC LYMPH NODES Tissue Lymph Node SURGICAL PATHOLOGY Sixto Malcolm MD 5/31/2019 1519        Implants:  * No implants in log *      Drains:   Closed/Suction Drain Lateral;Anterior LLQ Bulb 15 Bahraini (Active)       Urethral Catheter Double-lumen; Intermittent; Latex 18 fr (Active)       Findings:   - Difficult Pérez catheterization, 18F coude placed.  No leak noted intraoperatively    Yoana Fish MD  Date: 5/31/2019  Time: 8:20 PM
Bleeding that does not stop/Fever greater than (need to indicate Fahrenheit or Celsius)/Wound/Surgical Site with redness, or foul smelling discharge or pus/Unable to urinate

## 2024-02-07 ENCOUNTER — OFFICE VISIT (OUTPATIENT)
Dept: FAMILY MEDICINE CLINIC | Age: 64
End: 2024-02-07
Payer: MEDICAID

## 2024-02-07 VITALS
HEART RATE: 77 BPM | TEMPERATURE: 98 F | OXYGEN SATURATION: 94 % | WEIGHT: 239.2 LBS | DIASTOLIC BLOOD PRESSURE: 74 MMHG | BODY MASS INDEX: 36.25 KG/M2 | SYSTOLIC BLOOD PRESSURE: 120 MMHG | RESPIRATION RATE: 16 BRPM | HEIGHT: 68 IN

## 2024-02-07 DIAGNOSIS — R07.89 OTHER CHEST PAIN: ICD-10-CM

## 2024-02-07 DIAGNOSIS — G57.32 NEUROPATHY OF LEFT PERONEAL NERVE: ICD-10-CM

## 2024-02-07 DIAGNOSIS — I10 PRIMARY HYPERTENSION: ICD-10-CM

## 2024-02-07 DIAGNOSIS — Z00.00 WELL ADULT EXAM: Primary | ICD-10-CM

## 2024-02-07 DIAGNOSIS — C61 PROSTATE CANCER (HCC): ICD-10-CM

## 2024-02-07 DIAGNOSIS — R05.9 COUGH, UNSPECIFIED TYPE: ICD-10-CM

## 2024-02-07 DIAGNOSIS — E11.69 TYPE 2 DIABETES MELLITUS WITH OTHER SPECIFIED COMPLICATION, WITHOUT LONG-TERM CURRENT USE OF INSULIN (HCC): ICD-10-CM

## 2024-02-07 DIAGNOSIS — R30.0 DYSURIA: ICD-10-CM

## 2024-02-07 DIAGNOSIS — E78.2 MIXED HYPERLIPIDEMIA: ICD-10-CM

## 2024-02-07 PROCEDURE — 3074F SYST BP LT 130 MM HG: CPT | Performed by: FAMILY MEDICINE

## 2024-02-07 PROCEDURE — G8484 FLU IMMUNIZE NO ADMIN: HCPCS | Performed by: FAMILY MEDICINE

## 2024-02-07 PROCEDURE — 3078F DIAST BP <80 MM HG: CPT | Performed by: FAMILY MEDICINE

## 2024-02-07 PROCEDURE — 99396 PREV VISIT EST AGE 40-64: CPT | Performed by: FAMILY MEDICINE

## 2024-02-07 RX ORDER — ATORVASTATIN CALCIUM 40 MG/1
40 TABLET, FILM COATED ORAL NIGHTLY
Qty: 90 TABLET | Refills: 3 | Status: SHIPPED | OUTPATIENT
Start: 2024-02-07

## 2024-02-07 ASSESSMENT — PATIENT HEALTH QUESTIONNAIRE - PHQ9
SUM OF ALL RESPONSES TO PHQ QUESTIONS 1-9: 0
1. LITTLE INTEREST OR PLEASURE IN DOING THINGS: 0
SUM OF ALL RESPONSES TO PHQ9 QUESTIONS 1 & 2: 0
SUM OF ALL RESPONSES TO PHQ QUESTIONS 1-9: 0
2. FEELING DOWN, DEPRESSED OR HOPELESS: 0

## 2024-02-07 NOTE — PROGRESS NOTES
[facility]  [facility] MEDICINE  [address]  [address]  [facility] [address]  Dept: [phone]      Ashly Venegas is a 63 y.o. male who presents today for follow up on his  medical conditions as noted below.      Chief Complaint   Patient presents with    Diabetes    Annual Exam    Chest Pain     Twice in the past year       Patient Active Problem List:     Plantar wart     Dysuria     Prostate cancer (HCC)     Chest pain     Blurry vision, left eye     Weakness of left lower extremity     Diabetes mellitus (HCC)     Primary hypertension     Mixed hyperlipidemia     Left peroneal mononeuropathy     Peroneal neuropathy     Past Medical History:   Diagnosis Date    Cancer (HCC) 2019    prostate. [facility] [place]    DM (diabetes mellitus) (HCC)     pre   PCP    Hematuria     HLD (hyperlipidemia)     Hypertension     Intermittent abdominal pain     Nerve injury     left leg , states  s/p prostatectomy    DILAN (obstructive sleep apnea)     DOES NOT HAVE MACHINE    Peroneal neuropathy     Prediabetes       Past Surgical History:   Procedure Laterality Date    APPENDECTOMY      CIRCUMCISION N/A 07/10/2020    CIRCUMCISION performed by Andrew Contreras MD at [facility] OR    COLONOSCOPY N/A 02/01/2021    COLONOSCOPY POLYPECTOMY HOT BIOPSY performed by Ammon Vargas MD at [facility] ENDO    CYSTOSCOPY N/A 02/07/2019    CYSTOSCOPY performed by Andrew Contreras MD at [facility] OR    CYSTOSCOPY N/A 05/28/2020    CYSTOSCOPY performed by Andrew Contreras MD at [facility] OR    HERNIA REPAIR  06/25/2021    XI ROBOTIC LAPAROSCOPIC HERNIA VENTRAL REPAIR WITH MESH    HERNIA REPAIR N/A 06/25/2021    XI ROBOTIC LAPAROSCOPIC HERNIA VENTRAL REPAIR WITH MESH performed by Abe Diaz IV, DO at [facility] OR    LYMPH NODE DISSECTION Right 2004    NECK    PROSTATE BIOPSY N/A 03/21/2019    PROSTATE BIOPSY, ULTRASOUND  (OFFICE TO CONTACT [place] performed by Andrew Contreras MD at [facility] OR    PROSTATECTOMY N/A 05/31/2019    XI

## 2024-02-07 NOTE — PATIENT INSTRUCTIONS
New Updates for [employer] [facility]    Thank you for choosing [facility] to give you the best care! [employer] is always trying to think of new ways to help their patients. We are asking all patients to try out the new digital registration that is now available through the [employer] [facility]. Down load today!. Via the jaylen you're now able to update your personal and registration information prior to your upcoming appointment. This will save you time once you arrive at the office to check-in, not to mention your information remains safe!! Many other perks come from signing up for an account, such as:  Requesting refills  Scheduling an appointment  Completing an E-Visit  Sending a message to the office/provider  Having access to your medication list  Paying your bill/copay prior to your appointment  Scheduling your yearly mammogram  Review your test results    If you are not familiar the [employer] [facility], please ask one of us and we will be happy to answer any questions or help you set-up your account.

## 2024-02-08 ENCOUNTER — HOSPITAL ENCOUNTER (OUTPATIENT)
Age: 64
Discharge: HOME | End: 2024-02-08
Payer: MEDICAID

## 2024-02-08 ENCOUNTER — HOSPITAL ENCOUNTER (OUTPATIENT)
Age: 64
Discharge: HOME | End: 2024-02-10
Payer: MEDICAID

## 2024-02-08 ENCOUNTER — HOSPITAL ENCOUNTER (OUTPATIENT)
Dept: GENERAL RADIOLOGY | Age: 64
Discharge: HOME | End: 2024-02-10
Payer: MEDICAID

## 2024-02-08 DIAGNOSIS — I10 PRIMARY HYPERTENSION: ICD-10-CM

## 2024-02-08 DIAGNOSIS — E11.69 TYPE 2 DIABETES MELLITUS WITH OTHER SPECIFIED COMPLICATION, WITHOUT LONG-TERM CURRENT USE OF INSULIN (HCC): ICD-10-CM

## 2024-02-08 DIAGNOSIS — G57.32 NEUROPATHY OF LEFT PERONEAL NERVE: ICD-10-CM

## 2024-02-08 DIAGNOSIS — R05.9 COUGH, UNSPECIFIED TYPE: ICD-10-CM

## 2024-02-08 DIAGNOSIS — E78.2 MIXED HYPERLIPIDEMIA: ICD-10-CM

## 2024-02-08 DIAGNOSIS — Z00.00 WELL ADULT EXAM: ICD-10-CM

## 2024-02-08 DIAGNOSIS — R30.0 DYSURIA: ICD-10-CM

## 2024-02-08 DIAGNOSIS — C61 PROSTATE CANCER (HCC): ICD-10-CM

## 2024-02-08 LAB
ALBUMIN SERPL-MCNC: 4.5 G/DL (ref 3.5–5.2)
ALBUMIN/GLOB SERPL: 1.4 {RATIO} (ref 1–2.5)
ALP SERPL-CCNC: 60 U/L (ref 40–129)
ALT SERPL-CCNC: 37 U/L (ref 5–41)
ANION GAP SERPL CALCULATED.3IONS-SCNC: 8 MMOL/L (ref 9–17)
AST SERPL-CCNC: 26 U/L
BASOPHILS # BLD: 0.06 K/UL (ref 0–0.2)
BASOPHILS NFR BLD: 1 % (ref 0–2)
BILIRUB SERPL-MCNC: 0.8 MG/DL (ref 0.3–1.2)
BUN SERPL-MCNC: 15 MG/DL (ref 8–23)
CALCIUM SERPL-MCNC: 9 MG/DL (ref 8.6–10.4)
CHLORIDE SERPL-SCNC: 102 MMOL/L (ref 98–107)
CHOLEST SERPL-MCNC: 215 MG/DL
CHOLESTEROL/HDL RATIO: 4.3
CO2 SERPL-SCNC: 27 MMOL/L (ref 20–31)
CREAT SERPL-MCNC: 0.8 MG/DL (ref 0.7–1.2)
CREAT UR-MCNC: 148.2 MG/DL (ref 39–259)
EOSINOPHIL # BLD: 0.23 K/UL (ref 0–0.44)
EOSINOPHILS RELATIVE PERCENT: 3 % (ref 1–4)
ERYTHROCYTE [DISTWIDTH] IN BLOOD BY AUTOMATED COUNT: 13.2 % (ref 11.8–14.4)
EST. AVERAGE GLUCOSE BLD GHB EST-MCNC: 134 MG/DL
GFR SERPL CREATININE-BSD FRML MDRD: >60 ML/MIN/1.73M2
GLUCOSE SERPL-MCNC: 113 MG/DL (ref 70–99)
HBA1C MFR BLD: 6.3 % (ref 4–6)
HCT VFR BLD AUTO: 46.2 % (ref 40.7–50.3)
HDLC SERPL-MCNC: 50 MG/DL
HGB BLD-MCNC: 15.2 G/DL (ref 13–17)
IMM GRANULOCYTES # BLD AUTO: 0.03 K/UL (ref 0–0.3)
IMM GRANULOCYTES NFR BLD: 0 %
LDLC SERPL CALC-MCNC: 107 MG/DL (ref 0–130)
LYMPHOCYTES NFR BLD: 2.25 K/UL (ref 1.1–3.7)
LYMPHOCYTES RELATIVE PERCENT: 30 % (ref 24–43)
MCH RBC QN AUTO: 28.8 PG (ref 25.2–33.5)
MCHC RBC AUTO-ENTMCNC: 32.9 G/DL (ref 28.4–34.8)
MCV RBC AUTO: 87.5 FL (ref 82.6–102.9)
MONOCYTES NFR BLD: 0.56 K/UL (ref 0.1–1.2)
MONOCYTES NFR BLD: 7 % (ref 3–12)
NEUTROPHILS NFR BLD: 59 % (ref 36–65)
NEUTS SEG NFR BLD: 4.41 K/UL (ref 1.5–8.1)
NRBC BLD-RTO: 0 PER 100 WBC
PLATELET # BLD AUTO: 201 K/UL (ref 138–453)
PMV BLD AUTO: 11 FL (ref 8.1–13.5)
POTASSIUM SERPL-SCNC: 3.9 MMOL/L (ref 3.7–5.3)
PROT SERPL-MCNC: 7.7 G/DL (ref 6.4–8.3)
PSA SERPL-MCNC: <0.02 NG/ML
RBC # BLD AUTO: 5.28 M/UL (ref 4.21–5.77)
SODIUM SERPL-SCNC: 137 MMOL/L (ref 135–144)
T4 FREE SERPL-MCNC: 0.9 NG/DL (ref 0.9–1.7)
TOTAL PROTEIN, URINE: 17 MG/DL
TRIGL SERPL-MCNC: 288 MG/DL
TSH SERPL DL<=0.05 MIU/L-ACNC: 2.93 UIU/ML (ref 0.3–5)
URINE TOTAL PROTEIN CREATININE RATIO: 0.11 (ref 0–0.2)
WBC OTHER # BLD: 7.5 K/UL (ref 3.5–11.3)

## 2024-02-08 PROCEDURE — 84403 ASSAY OF TOTAL TESTOSTERONE: CPT

## 2024-02-08 PROCEDURE — 36415 COLL VENOUS BLD VENIPUNCTURE: CPT

## 2024-02-08 PROCEDURE — 84443 ASSAY THYROID STIM HORMONE: CPT

## 2024-02-08 PROCEDURE — 85025 COMPLETE CBC W/AUTO DIFF WBC: CPT

## 2024-02-08 PROCEDURE — 84270 ASSAY OF SEX HORMONE GLOBUL: CPT

## 2024-02-08 PROCEDURE — 83036 HEMOGLOBIN GLYCOSYLATED A1C: CPT

## 2024-02-08 PROCEDURE — 82570 ASSAY OF URINE CREATININE: CPT

## 2024-02-08 PROCEDURE — 80061 LIPID PANEL: CPT

## 2024-02-08 PROCEDURE — 71046 X-RAY EXAM CHEST 2 VIEWS: CPT

## 2024-02-08 PROCEDURE — 80053 COMPREHEN METABOLIC PANEL: CPT

## 2024-02-08 PROCEDURE — 84153 ASSAY OF PSA TOTAL: CPT

## 2024-02-08 PROCEDURE — 84156 ASSAY OF PROTEIN URINE: CPT

## 2024-02-08 PROCEDURE — 84439 ASSAY OF FREE THYROXINE: CPT

## 2024-02-08 PROCEDURE — 93005 ELECTROCARDIOGRAM TRACING: CPT | Performed by: FAMILY MEDICINE

## 2024-02-09 LAB
EKG ATRIAL RATE: 63 BPM
EKG P AXIS: 20 DEGREES
EKG P-R INTERVAL: 174 MS
EKG Q-T INTERVAL: 424 MS
EKG QRS DURATION: 90 MS
EKG QTC CALCULATION (BAZETT): 433 MS
EKG R AXIS: -23 DEGREES
EKG T AXIS: -8 DEGREES
EKG VENTRICULAR RATE: 63 BPM
SHBG SERPL-SCNC: 22 NMOL/L (ref 11–80)
TESTOST FREE MFR SERPL: 65.8 PG/ML (ref 47–244)
TESTOST SERPL-MCNC: 272 NG/DL (ref 220–1000)

## 2024-02-26 DIAGNOSIS — E11.69 TYPE 2 DIABETES MELLITUS WITH OTHER SPECIFIED COMPLICATION, WITHOUT LONG-TERM CURRENT USE OF INSULIN (HCC): ICD-10-CM

## 2024-02-27 RX ORDER — BLOOD SUGAR DIAGNOSTIC
STRIP MISCELLANEOUS
Qty: 100 STRIP | Refills: 5 | Status: SHIPPED | OUTPATIENT
Start: 2024-02-27

## 2024-02-28 ENCOUNTER — TELEPHONE (OUTPATIENT)
Dept: FAMILY MEDICINE CLINIC | Age: 64
End: 2024-02-28

## 2024-02-28 RX ORDER — LEVOFLOXACIN 500 MG/1
500 TABLET, FILM COATED ORAL DAILY
Qty: 10 TABLET | Refills: 0 | Status: SHIPPED | OUTPATIENT
Start: 2024-02-28 | End: 2024-03-09

## 2024-02-28 NOTE — TELEPHONE ENCOUNTER
Patient called in asking if pcp can send in antibiotics for cough, he has no other symptoms and says it has been going on for over a month. Patient says he coughs up green phlegm all day. Patient advised may need to be seen. Please advise

## 2024-02-29 DIAGNOSIS — I10 PRIMARY HYPERTENSION: ICD-10-CM

## 2024-02-29 RX ORDER — OLMESARTAN MEDOXOMIL AND HYDROCHLOROTHIAZIDE 20/12.5 20; 12.5 MG/1; MG/1
1 TABLET ORAL DAILY
Qty: 90 TABLET | Refills: 1 | Status: SHIPPED | OUTPATIENT
Start: 2024-02-29

## 2024-02-29 NOTE — TELEPHONE ENCOUNTER
Ashly Venegas is calling to request a refill on the following medication(s):    Last Visit Date (If Applicable):  2/7/2024    Next Visit Date:    Visit date not found    Medication Request:  Requested Prescriptions     Pending Prescriptions Disp Refills    olmesartan-hydroCHLOROthiazide (BENICAR HCT) 20-12.5 MG per tablet [Pharmacy Med Name: OLMESARTAN MEDOX/HCTZ 20-12.5MG TAB] 90 tablet 1     Sig: TAKE 1 TABLET BY MOUTH DAILY

## 2024-03-12 RX ORDER — OXYBUTYNIN CHLORIDE 10 MG/1
10 TABLET, EXTENDED RELEASE ORAL DAILY
Qty: 90 TABLET | Refills: 0 | OUTPATIENT
Start: 2024-03-12

## 2024-03-13 ENCOUNTER — TELEPHONE (OUTPATIENT)
Dept: FAMILY MEDICINE CLINIC | Age: 64
End: 2024-03-13

## 2024-03-13 NOTE — TELEPHONE ENCOUNTER
Nodules in his chest have nothing to do with him having chest pain all night if he still is having chest pain he needs to go to the ER

## 2024-03-13 NOTE — TELEPHONE ENCOUNTER
Patient called in stating he has had chest pain all night he states he has small nodules on his chest and he is not sure what to do because all his test is coming back normal he is wanting to know are you going to order more test for him to get done to figure out what is the cause if the chest pain.      Please advise

## 2024-06-20 RX ORDER — CHOLECALCIFEROL (VITAMIN D3) 125 MCG
5000 CAPSULE ORAL DAILY
Qty: 90 CAPSULE | Refills: 0 | Status: SHIPPED | OUTPATIENT
Start: 2024-06-20

## 2024-06-20 NOTE — TELEPHONE ENCOUNTER
Ashly Venegas is calling to request a refill on the following medication(s):    Last Visit Date (If Applicable):  2/7/2024    Next Visit Date:    6/24/2024    Medication Request:  Requested Prescriptions     Pending Prescriptions Disp Refills    D3 MAXIMUM STRENGTH 125 MCG (5000 UT) CAPS capsule [Pharmacy Med Name: VITAMIN D3 5000 UNIT CAPSULES] 90 capsule 0     Sig: TAKE 1 CAPSULE BY MOUTH DAILY

## 2024-06-26 ENCOUNTER — OFFICE VISIT (OUTPATIENT)
Dept: FAMILY MEDICINE CLINIC | Age: 64
End: 2024-06-26
Payer: MEDICAID

## 2024-06-26 VITALS
DIASTOLIC BLOOD PRESSURE: 77 MMHG | HEART RATE: 72 BPM | HEIGHT: 68 IN | BODY MASS INDEX: 36.37 KG/M2 | OXYGEN SATURATION: 96 % | SYSTOLIC BLOOD PRESSURE: 144 MMHG | WEIGHT: 240 LBS

## 2024-06-26 DIAGNOSIS — Z59.41 FOOD INSECURITY: ICD-10-CM

## 2024-06-26 DIAGNOSIS — M25.561 ACUTE PAIN OF RIGHT KNEE: ICD-10-CM

## 2024-06-26 DIAGNOSIS — E78.2 MIXED HYPERLIPIDEMIA: ICD-10-CM

## 2024-06-26 DIAGNOSIS — I10 PRIMARY HYPERTENSION: ICD-10-CM

## 2024-06-26 DIAGNOSIS — E11.69 TYPE 2 DIABETES MELLITUS WITH OTHER SPECIFIED COMPLICATION, WITHOUT LONG-TERM CURRENT USE OF INSULIN (HCC): Primary | ICD-10-CM

## 2024-06-26 LAB — HBA1C MFR BLD: 7.4 %

## 2024-06-26 PROCEDURE — 99214 OFFICE O/P EST MOD 30 MIN: CPT | Performed by: FAMILY MEDICINE

## 2024-06-26 PROCEDURE — 3077F SYST BP >= 140 MM HG: CPT | Performed by: FAMILY MEDICINE

## 2024-06-26 PROCEDURE — 2022F DILAT RTA XM EVC RTNOPTHY: CPT | Performed by: FAMILY MEDICINE

## 2024-06-26 PROCEDURE — 3017F COLORECTAL CA SCREEN DOC REV: CPT | Performed by: FAMILY MEDICINE

## 2024-06-26 PROCEDURE — 1036F TOBACCO NON-USER: CPT | Performed by: FAMILY MEDICINE

## 2024-06-26 PROCEDURE — 3051F HG A1C>EQUAL 7.0%<8.0%: CPT | Performed by: FAMILY MEDICINE

## 2024-06-26 PROCEDURE — G8427 DOCREV CUR MEDS BY ELIG CLIN: HCPCS | Performed by: FAMILY MEDICINE

## 2024-06-26 PROCEDURE — G8417 CALC BMI ABV UP PARAM F/U: HCPCS | Performed by: FAMILY MEDICINE

## 2024-06-26 PROCEDURE — 83036 HEMOGLOBIN GLYCOSYLATED A1C: CPT | Performed by: FAMILY MEDICINE

## 2024-06-26 PROCEDURE — 3078F DIAST BP <80 MM HG: CPT | Performed by: FAMILY MEDICINE

## 2024-06-26 RX ORDER — ATORVASTATIN CALCIUM 40 MG/1
40 TABLET, FILM COATED ORAL NIGHTLY
Qty: 90 TABLET | Refills: 3 | Status: SHIPPED | OUTPATIENT
Start: 2024-06-26

## 2024-06-26 RX ORDER — OLMESARTAN MEDOXOMIL AND HYDROCHLOROTHIAZIDE 20/12.5 20; 12.5 MG/1; MG/1
1 TABLET ORAL DAILY
Qty: 90 TABLET | Refills: 1 | Status: SHIPPED | OUTPATIENT
Start: 2024-06-26

## 2024-06-26 RX ORDER — DAPAGLIFLOZIN 10 MG/1
10 TABLET, FILM COATED ORAL EVERY MORNING
Qty: 90 TABLET | Refills: 3 | Status: SHIPPED | OUTPATIENT
Start: 2024-06-26

## 2024-06-26 SDOH — ECONOMIC STABILITY: FOOD INSECURITY: WITHIN THE PAST 12 MONTHS, YOU WORRIED THAT YOUR FOOD WOULD RUN OUT BEFORE YOU GOT MONEY TO BUY MORE.: OFTEN TRUE

## 2024-06-26 SDOH — ECONOMIC STABILITY: INCOME INSECURITY: HOW HARD IS IT FOR YOU TO PAY FOR THE VERY BASICS LIKE FOOD, HOUSING, MEDICAL CARE, AND HEATING?: SOMEWHAT HARD

## 2024-06-26 SDOH — ECONOMIC STABILITY: FOOD INSECURITY: WITHIN THE PAST 12 MONTHS, THE FOOD YOU BOUGHT JUST DIDN'T LAST AND YOU DIDN'T HAVE MONEY TO GET MORE.: OFTEN TRUE

## 2024-06-26 SDOH — ECONOMIC STABILITY - FOOD INSECURITY: FOOD INSECURITY: Z59.41

## 2024-06-26 NOTE — PROGRESS NOTES
[facility]  [facility]  [address]  [address]  [place] [address]  Dept: [phone]      Ashly Venegas is a 63 y.o. male who presents today for follow up on his  medical conditions as noted below.      Chief Complaint   Patient presents with    Diabetes    Leg Pain     Right        Patient Active Problem List:     Plantar wart     Dysuria     Prostate cancer (HCC)     Chest pain     Blurry vision, left eye     Weakness of left lower extremity     Diabetes mellitus (HCC)     Primary hypertension     Mixed hyperlipidemia     Left peroneal mononeuropathy     Peroneal neuropathy     Past Medical History:   Diagnosis Date    Cancer (HCC) 2019    prostate. [facility]    DM (diabetes mellitus) (HCC)     pre   PCP    Hematuria     HLD (hyperlipidemia)     Hypertension     Intermittent abdominal pain     Nerve injury     left leg , states  s/p prostatectomy    DILAN (obstructive sleep apnea)     DOES NOT HAVE MACHINE    Peroneal neuropathy     Prediabetes       Past Surgical History:   Procedure Laterality Date    APPENDECTOMY      CIRCUMCISION N/A 07/10/2020    CIRCUMCISION performed by Andrew Contreras MD at [facility] OR    COLONOSCOPY N/A 02/01/2021    COLONOSCOPY POLYPECTOMY HOT BIOPSY performed by Ammon Vargas MD at [facility] ENDO    CYSTOSCOPY N/A 02/07/2019    CYSTOSCOPY performed by Andrew Contreras MD at [facility] OR    CYSTOSCOPY N/A 05/28/2020    CYSTOSCOPY performed by Andrew Contreras MD at [facility] OR    HERNIA REPAIR  06/25/2021    XI ROBOTIC LAPAROSCOPIC HERNIA VENTRAL REPAIR WITH MESH    HERNIA REPAIR N/A 06/25/2021    XI ROBOTIC LAPAROSCOPIC HERNIA VENTRAL REPAIR WITH MESH performed by Abe Diaz IV, DO at [facility] OR    LYMPH NODE DISSECTION Right 2004    NECK    PROSTATE BIOPSY N/A 03/21/2019    PROSTATE BIOPSY, ULTRASOUND  (OFFICE TO CONTACT [place] performed by Andrew Contreras MD at [facility] OR    PROSTATECTOMY N/A 05/31/2019    XI LAPAROSCOPIC ROBOTIC RADICAL

## 2024-06-27 ENCOUNTER — CARE COORDINATION (OUTPATIENT)
Dept: CARE COORDINATION | Age: 64
End: 2024-06-27

## 2024-06-27 ENCOUNTER — HOSPITAL ENCOUNTER (OUTPATIENT)
Age: 64
Discharge: HOME | End: 2024-06-29
Payer: MEDICAID

## 2024-06-27 ENCOUNTER — HOSPITAL ENCOUNTER (OUTPATIENT)
Dept: GENERAL RADIOLOGY | Age: 64
Discharge: HOME | End: 2024-06-29
Payer: MEDICAID

## 2024-06-27 DIAGNOSIS — M25.561 ACUTE PAIN OF RIGHT KNEE: ICD-10-CM

## 2024-06-27 PROCEDURE — 73562 X-RAY EXAM OF KNEE 3: CPT

## 2024-06-27 NOTE — CARE COORDINATION
Writer received the referral from OSMANI CRISTINA [facility] NURSE in regards to Food insecurities.    Writer called Patient to introduce myself and to gauge any needs that he may have.    Patient and writer spoke and writer will get a RESOURCE LIST out in the mail on tomorrow 06/28/2024.

## 2024-06-28 ENCOUNTER — CARE COORDINATION (OUTPATIENT)
Dept: CARE COORDINATION | Age: 64
End: 2024-06-28

## 2024-06-28 NOTE — CARE COORDINATION
Patient is in need of [employer] Resources and writer will meet with [facility] at [facility] his home so I can give him the list so he can Utilize their Services.

## 2024-07-17 ENCOUNTER — CARE COORDINATION (OUTPATIENT)
Dept: CARE COORDINATION | Age: 64
End: 2024-07-17

## 2024-07-17 NOTE — CARE COORDINATION
Writer called Patient to see if he was able to Utilize the Resource list that was provided to him.    Patient did not answer his phone and his Voicemail Box is not set up for Messages to be left on the phone.    Writer will try back in a week or so.

## 2024-07-24 ENCOUNTER — CARE COORDINATION (OUTPATIENT)
Dept: CARE COORDINATION | Age: 64
End: 2024-07-24

## 2024-07-24 NOTE — CARE COORDINATION
Writer called Patient to do a follow up Social service call with him to gauge his needs.    Arlethr was calling to see if he had received the Resource list that  had sent out to him.    Patient did not answer his phone and his voicemail box on his phone is not set up to receive messages.    Writer will call back in a week or so.

## 2024-08-14 ENCOUNTER — CARE COORDINATION (OUTPATIENT)
Dept: CARE COORDINATION | Age: 64
End: 2024-08-14

## 2024-08-14 NOTE — CARE COORDINATION
Writer called Patient to see if he was able to Utilize the Resource list that writer had given to him.    Patient reported that he received it, Writer is signing off on the case at this time.    No other needs exist.

## 2024-09-11 ENCOUNTER — CARE COORDINATION (OUTPATIENT)
Dept: CARE COORDINATION | Age: 64
End: 2024-09-11

## 2024-09-27 ENCOUNTER — TELEPHONE (OUTPATIENT)
Dept: FAMILY MEDICINE CLINIC | Age: 64
End: 2024-09-27

## 2024-09-27 NOTE — TELEPHONE ENCOUNTER
Patient was seen on 6/26/2024, for knee pain he has a lump on his knee and states needing a referral. Please advise.

## 2024-09-30 ENCOUNTER — OFFICE VISIT (OUTPATIENT)
Dept: FAMILY MEDICINE CLINIC | Age: 64
End: 2024-09-30
Payer: MEDICAID

## 2024-09-30 VITALS
BODY MASS INDEX: 35.01 KG/M2 | SYSTOLIC BLOOD PRESSURE: 129 MMHG | HEART RATE: 80 BPM | WEIGHT: 231 LBS | OXYGEN SATURATION: 97 % | DIASTOLIC BLOOD PRESSURE: 73 MMHG | HEIGHT: 68 IN

## 2024-09-30 DIAGNOSIS — M71.21 BAKER'S CYST OF KNEE, RIGHT: Primary | ICD-10-CM

## 2024-09-30 DIAGNOSIS — E11.69 TYPE 2 DIABETES MELLITUS WITH OTHER SPECIFIED COMPLICATION, WITHOUT LONG-TERM CURRENT USE OF INSULIN (HCC): ICD-10-CM

## 2024-09-30 DIAGNOSIS — I10 PRIMARY HYPERTENSION: ICD-10-CM

## 2024-09-30 DIAGNOSIS — E78.2 MIXED HYPERLIPIDEMIA: ICD-10-CM

## 2024-09-30 PROCEDURE — 3074F SYST BP LT 130 MM HG: CPT | Performed by: FAMILY MEDICINE

## 2024-09-30 PROCEDURE — 3051F HG A1C>EQUAL 7.0%<8.0%: CPT | Performed by: FAMILY MEDICINE

## 2024-09-30 PROCEDURE — 1036F TOBACCO NON-USER: CPT | Performed by: FAMILY MEDICINE

## 2024-09-30 PROCEDURE — 3017F COLORECTAL CA SCREEN DOC REV: CPT | Performed by: FAMILY MEDICINE

## 2024-09-30 PROCEDURE — G8417 CALC BMI ABV UP PARAM F/U: HCPCS | Performed by: FAMILY MEDICINE

## 2024-09-30 PROCEDURE — G8427 DOCREV CUR MEDS BY ELIG CLIN: HCPCS | Performed by: FAMILY MEDICINE

## 2024-09-30 PROCEDURE — 2022F DILAT RTA XM EVC RTNOPTHY: CPT | Performed by: FAMILY MEDICINE

## 2024-09-30 PROCEDURE — 99214 OFFICE O/P EST MOD 30 MIN: CPT | Performed by: FAMILY MEDICINE

## 2024-09-30 PROCEDURE — 3078F DIAST BP <80 MM HG: CPT | Performed by: FAMILY MEDICINE

## 2024-09-30 NOTE — PROGRESS NOTES
External ear normal. Tympanic membrane is not erythematous.  No middle ear effusion.   Nose: No mucosal edema.   Mouth/Throat: Oropharynx is clear and moist. No posterior oropharyngeal erythema.    Eyes: Conjunctivae and EOM are normal. Pupils are equal, round, and reactive to light.    Neck: Normal range of motion. Neck supple. No thyromegaly present.   Cardiovascular: Normal rate, regular rhythm and normal heart sounds.    No murmur heard.  Pulmonary/Chest: Effort normal and breath sounds normal. He has no wheezes. Hehas no rales.   Abdominal: Soft. Bowel sounds are normal. He exhibits no distension and no mass.  There is no tenderness. There is no rebound and no guarding.   Genitourinary/Anorectal:deferred  Musculoskeletal: Normal range of motion. He exhibits no edema or medial joint line right knee tenderness.  He has approximate 3 cm posterior lateral lump of the right knee  Lymphadenopathy: He has no cervical adenopathy.   Neurological: He is alert and oriented to person, place, and time. He has normal reflexes.   Skin: Skin is warm and dry. No rash noted.   Psychiatric: He has a normal mood and affect. His   behavior is normal.       Assessment:      1. Baker's cyst of knee, right    2. Primary hypertension    3. Mixed hyperlipidemia    4. Type 2 diabetes mellitus with other specified complication, without long-term current use of insulin (HCC)          Plan:      Call or return to clinic prn if these symptoms worsen or fail to improve as anticipated.  I have reviewed the instructions with the patient, answering all questions to his satisfaction.    No follow-ups on file.  Orders Placed This Encounter   Procedures    MRI KNEE RIGHT W WO CONTRAST     Standing Status:   Future     Standing Expiration Date:   9/30/2025     Order Specific Question:   STAT Creatinine as needed:     Answer:   Yes     Order Specific Question:   Reason for exam:     Answer:   bakers cyst    Creatinine     Standing Status:   Future

## 2024-10-08 ENCOUNTER — HOSPITAL ENCOUNTER (OUTPATIENT)
Dept: MRI IMAGING | Age: 64
Discharge: HOME | End: 2024-10-10
Attending: FAMILY MEDICINE
Payer: MEDICAID

## 2024-10-08 DIAGNOSIS — M71.21 BAKER'S CYST OF KNEE, RIGHT: ICD-10-CM

## 2024-10-08 PROCEDURE — 73721 MRI JNT OF LWR EXTRE W/O DYE: CPT

## 2024-10-10 ENCOUNTER — PATIENT MESSAGE (OUTPATIENT)
Dept: FAMILY MEDICINE CLINIC | Age: 64
End: 2024-10-10

## (undated) DEVICE — PROTECTOR ULN NRV PUR FOAM HK LOOP STRP ANATOMICALLY

## (undated) DEVICE — CONTAINER,SPECIMEN,4OZ,OR STRL: Brand: MEDLINE

## (undated) DEVICE — GARMENT,MEDLINE,DVT,INT,CALF,MED, GEN2: Brand: MEDLINE

## (undated) DEVICE — Z DISCONTINUED USE 2717540 SUTURE ABSORBABLE MONOFILAMENT 3-0 RB 1 6 IN STRATAFIX SPRL

## (undated) DEVICE — SKIN MARKER,FINE TIP: Brand: DEVON

## (undated) DEVICE — SUTURE SZ 0 27IN 5/8 CIR UR-6  TAPER PT VIOLET ABSRB VICRYL J603H

## (undated) DEVICE — GLOVE ORANGE PI 7   MSG9070

## (undated) DEVICE — Device

## (undated) DEVICE — SYRINGE, LUER LOCK, 10ML: Brand: MEDLINE

## (undated) DEVICE — Z DISCONTINUED BY MEDLINE USE 2711682 TRAY SKIN PREP DRY W/ PREM GLV

## (undated) DEVICE — ERBE NESSY® OMEGA PLATE USA (85+23)CM² , WITH CABLE 3 M: Brand: ERBE

## (undated) DEVICE — CANNULA SEAL

## (undated) DEVICE — GLOVE SURG SZ 75 L12IN FNGR THK87MIL WHT LTX FREE

## (undated) DEVICE — TIP COVER ACCESSORY

## (undated) DEVICE — CATHETER URETH 24FR BLLN 30CC SIL ALLY W/ SIL HYDRGEL 3 W F

## (undated) DEVICE — STERILE POLYISOPRENE POWDER-FREE SURGICAL GLOVES WITH EMOLLIENT COATING: Brand: PROTEXIS

## (undated) DEVICE — ELECTRODE ELECSURG NDL 2.8 INX7.2 CM COAT INSUL EDGE

## (undated) DEVICE — COVER,LIGHT HANDLE,FLX,2/PK: Brand: MEDLINE INDUSTRIES, INC.

## (undated) DEVICE — SHEET DRAPE FULL 70X100

## (undated) DEVICE — MONOPTY® DISPOSABLE CORE BIOPSY INSTRUMENT, 22MM PENETRATION DEPTH, 18G X 20CM: Brand: MONOPTY

## (undated) DEVICE — TOWEL,OR,DSP,ST,NATURAL,DLX,4/PK,20PK/CS: Brand: MEDLINE

## (undated) DEVICE — PACK PROCEDURE SURG CYSTO SVMMC LF

## (undated) DEVICE — TRI-LUMEN FILTERED TUBE SET WITH ACTIVATED CHARCOAL FILTER: Brand: AIRSEAL

## (undated) DEVICE — 40580 - THE PINK PAD - ADVANCED TRENDELENBURG POSITIONING KIT: Brand: 40580 - THE PINK PAD - ADVANCED TRENDELENBURG POSITIONING KIT

## (undated) DEVICE — DEVICE TRCR 12X9X3IN WHT CLSR DISP OMNICLOSE

## (undated) DEVICE — GLOVE SURG SZ 65 THK91MIL LTX FREE SYN POLYISOPRENE

## (undated) DEVICE — SNARE ENDOSCP L240CM LOOP W13MM DIA2.4MM SHT THROW SM OVL

## (undated) DEVICE — INSUFFLATION NEEDLE TO ESTABLISH PNEUMOPERITONEUM.: Brand: INSUFFLATION NEEDLE

## (undated) DEVICE — SOLUTION SCRB 4OZ 4% CHG H2O AIDED FOR PREOPERATIVE SKIN

## (undated) DEVICE — GOWN,SIRUS,NONRNF,SETINSLV,XL,20/CS: Brand: MEDLINE

## (undated) DEVICE — PATIENT RETURN ELECTRODE, SINGLE-USE, CONTACT QUALITY MONITORING, ADULT, WITH 9FT CORD, FOR PATIENTS WEIGING OVER 33LBS. (15KG): Brand: MEGADYNE

## (undated) DEVICE — ELECTRODE PT RET AD L9FT HI MOIST COND ADH HYDRGEL CORDED

## (undated) DEVICE — CHLORAPREP 26ML ORANGE

## (undated) DEVICE — GARMENT COMPR STD FOR 17IN CALF UNIF THER FLOTRN

## (undated) DEVICE — DRAINBAG,ANTI-REFLUX TOWER,L/F,2000ML,LL: Brand: MEDLINE

## (undated) DEVICE — TROCAR: Brand: KII FIOS FIRST ENTRY

## (undated) DEVICE — SUTURE V-LOC 180 SZ 3-0 L6IN ABSRB GRN V-20 L26MM 1/2 CIR VLOCL0604

## (undated) DEVICE — GLOVE ORANGE PI 7 1/2   MSG9075

## (undated) DEVICE — METER,URINE,400ML,DRAIN BAG,L/F,LL: Brand: MEDLINE

## (undated) DEVICE — CATHETER FOL 2 W 18 FR 5 CC URETH SPEC TIEM BARDX IC

## (undated) DEVICE — SUTURE DEV SZ 2-0 WND CLSR ABSRB GS-22 VLOC COVIDIEN VLOCM2145

## (undated) DEVICE — SKIN AFFIX SURG ADHESIVE 72/CS 0.55ML: Brand: MEDLINE

## (undated) DEVICE — SPONGE GZ W3XL3IN 4 PLY RAYON POLY STD NONWOVEN

## (undated) DEVICE — DRESSING PETRO W3XL3IN OIL EMUL N ADH GZ KNIT IMPREG CELOS

## (undated) DEVICE — SUTURE V-LOC 180 SZ 0 L9IN ABSRB GRN GS-21 L37MM 1/2 CIR VLOCL0346

## (undated) DEVICE — NEEDLE SPNL 22GA L7IN SPINOCAN

## (undated) DEVICE — SUTURE ETHLN SZ 3-0 L18IN NONABSORBABLE BLK L24MM PS-1 3/8 1663G

## (undated) DEVICE — BLADELESS OBTURATOR: Brand: WECK VISTA

## (undated) DEVICE — SOLUTION IV IRRIG WATER 1000ML POUR BRL 2F7114

## (undated) DEVICE — SCISSOR SURG METZ CRV TIP

## (undated) DEVICE — SVMMC PEDS/UROLOGY MINOR PACK: Brand: MEDLINE INDUSTRIES, INC.

## (undated) DEVICE — COVER LT HNDL BLU PLAS

## (undated) DEVICE — HEADREST NEURO DONUT 7 IN

## (undated) DEVICE — INSUFFLATION TUBING SET WITH FILTER, FUNNEL CONNECTOR AND LUER LOCK: Brand: JOSNOE MEDICAL INC

## (undated) DEVICE — NEEDLE HYPO 25GA L1.5IN BLU POLYPR HUB S STL REG BVL STR

## (undated) DEVICE — SOLUTION ANTIFOG VIS SYS CLEARIFY LAPSCP

## (undated) DEVICE — SUTURE VCRL SZ 1 L18IN ABSRB VLT CT-1 L36MM 1/2 CIR J741D

## (undated) DEVICE — CATHETER URETH 18FR BLLN 30CC 2 W F INF CTRL BARDX

## (undated) DEVICE — GOWN,AURORA,NONREINFORCED,LARGE: Brand: MEDLINE

## (undated) DEVICE — SUTURE MCRYL + SZ 4-0 L18IN ABSRB UD L19MM PS-2 3/8 CIR MCP496G

## (undated) DEVICE — POLYP TRAP: Brand: TRAPEASE®

## (undated) DEVICE — ELECTRO LUBE IS A SINGLE PATIENT USE DEVICE THAT IS INTENDED TO BE USED ON ELECTROSURGICAL ELECTRODES TO REDUCE STICKING.: Brand: KEY SURGICAL ELECTRO LUBE

## (undated) DEVICE — FORCEPS BX L240CM WRK CHN 2.8MM STD CAP W/ NDL MIC MESH

## (undated) DEVICE — GLOVE ORANGE PI 8   MSG9080

## (undated) DEVICE — GLOVE ORANGE PI 8 1/2   MSG9085

## (undated) DEVICE — GLOVE ORTHO 7 1/2   MSG9475

## (undated) DEVICE — INTENDED FOR TISSUE SEPARATION, AND OTHER PROCEDURES THAT REQUIRE A SHARP SURGICAL BLADE TO PUNCTURE OR CUT.: Brand: BARD-PARKER ® CARBON RIB-BACK BLADES

## (undated) DEVICE — ARM DRAPE

## (undated) DEVICE — DRAIN,WOUND,15FR,3/16,FULL-FLUTED: Brand: MEDLINE

## (undated) DEVICE — PLUMEPORT SEO LAPAROSCOPIC SMOKE FILTRATION DEVICE: Brand: PLUMEPORT

## (undated) DEVICE — NEEDLE INSUF L120MM DIA2MM DISP FOR PNEUMOPERI ENDOPATH

## (undated) DEVICE — ADHESIVE SKIN CLSR 0.7ML TOP DERMBND ADV

## (undated) DEVICE — SUTURE CHROMIC GUT SZ 3-0 L27IN ABSRB BRN L26MM SH 1/2 CIR G122H

## (undated) DEVICE — RESERVOIR,SUCTION,100CC,SILICONE: Brand: MEDLINE

## (undated) DEVICE — SYRINGE MED 10ML LUERLOCK TIP W/O SFTY DISP

## (undated) DEVICE — DRAPE,REIN 53X77,STERILE: Brand: MEDLINE

## (undated) DEVICE — APPLICATOR MEDICATED 26 CC SOLUTION HI LT ORNG CHLORAPREP

## (undated) DEVICE — AIRSEAL 12 MM ACCESS PORT AND PALM GRIP OBTURATOR WITH BLADELESS OPTICAL TIP, 120 MM LENGTH: Brand: AIRSEAL

## (undated) DEVICE — DEFENDO AIR WATER SUCTION AND BIOPSY VALVE KIT FOR  OLYMPUS: Brand: DEFENDO AIR/WATER/SUCTION AND BIOPSY VALVE

## (undated) DEVICE — BLADE CLIPPER GEN PURP NS

## (undated) DEVICE — ENDO KIT W/SYRINGE: Brand: MEDLINE INDUSTRIES, INC.